# Patient Record
Sex: FEMALE | Race: WHITE | NOT HISPANIC OR LATINO | Employment: UNEMPLOYED | URBAN - NONMETROPOLITAN AREA
[De-identification: names, ages, dates, MRNs, and addresses within clinical notes are randomized per-mention and may not be internally consistent; named-entity substitution may affect disease eponyms.]

---

## 2017-01-03 RX ORDER — CARVEDILOL 25 MG/1
25 TABLET ORAL 2 TIMES DAILY WITH MEALS
Qty: 60 TABLET | Refills: 3 | Status: SHIPPED | OUTPATIENT
Start: 2017-01-03 | End: 2017-06-02 | Stop reason: SDUPTHER

## 2017-05-04 RX ORDER — CLOPIDOGREL BISULFATE 75 MG/1
75 TABLET ORAL DAILY
Qty: 30 TABLET | Refills: 3 | Status: SHIPPED | OUTPATIENT
Start: 2017-05-04 | End: 2017-09-05 | Stop reason: SDUPTHER

## 2017-06-02 RX ORDER — CARVEDILOL 25 MG/1
25 TABLET ORAL 2 TIMES DAILY WITH MEALS
Qty: 60 TABLET | Refills: 0 | Status: SHIPPED | OUTPATIENT
Start: 2017-06-02 | End: 2017-06-16 | Stop reason: SDUPTHER

## 2017-06-15 ENCOUNTER — OFFICE VISIT (OUTPATIENT)
Dept: CARDIOLOGY | Facility: CLINIC | Age: 57
End: 2017-06-15

## 2017-06-15 VITALS
WEIGHT: 114 LBS | HEIGHT: 55 IN | DIASTOLIC BLOOD PRESSURE: 87 MMHG | BODY MASS INDEX: 26.38 KG/M2 | RESPIRATION RATE: 16 BRPM | SYSTOLIC BLOOD PRESSURE: 132 MMHG | HEART RATE: 79 BPM

## 2017-06-15 DIAGNOSIS — R07.89 OTHER CHEST PAIN: Primary | ICD-10-CM

## 2017-06-15 PROBLEM — I10 ESSENTIAL HYPERTENSION: Status: ACTIVE | Noted: 2017-06-15

## 2017-06-15 PROBLEM — R07.9 CHEST PAIN: Status: ACTIVE | Noted: 2017-06-15

## 2017-06-15 PROCEDURE — 93000 ELECTROCARDIOGRAM COMPLETE: CPT | Performed by: INTERNAL MEDICINE

## 2017-06-15 PROCEDURE — 99213 OFFICE O/P EST LOW 20 MIN: CPT | Performed by: INTERNAL MEDICINE

## 2017-06-15 RX ORDER — LORATADINE 10 MG/1
10 CAPSULE, LIQUID FILLED ORAL DAILY
COMMUNITY
End: 2017-11-01

## 2017-06-15 NOTE — PROGRESS NOTES
BUDDY Cutler  Grecia Galicia  1960  06/15/2017    Patient Active Problem List   Diagnosis   • Essential hypertension   • Chest pain       Dear BUDDY Cutler:    Subjective     Grecia Galicia is a 56 y.o. female with the problems as listed above, presents      History of Present Illness: Ms. Galicia is a 55-year-old  female with history of chest pains and had a normal nuclear stress test provoked without 14.  She is here for a regular cardiac follow-up.  She denies any further chest pains or shortness of breath.  He feels overall fine.  She  continues to smoke about a pack a day.        Allergies   Allergen Reactions   • No Known Drug Allergy    :      Current Outpatient Prescriptions:   •  albuterol (PROVENTIL HFA;VENTOLIN HFA) 108 (90 BASE) MCG/ACT inhaler, Inhale 2 puffs every 4 (four) hours as needed for wheezing., Disp: , Rfl:   •  aspirin 81 MG tablet, Take  by mouth., Disp: , Rfl:   •  atorvastatin (LIPITOR) 40 MG tablet, Take  by mouth., Disp: , Rfl:   •  carvedilol (COREG) 25 MG tablet, Take 1 tablet by mouth 2 (Two) Times a Day With Meals., Disp: 60 tablet, Rfl: 0  •  clopidogrel (PLAVIX) 75 MG tablet, Take 1 tablet by mouth Daily., Disp: 30 tablet, Rfl: 3  •  fluticasone (FLONASE) 50 MCG/ACT nasal spray, 2 sprays into each nostril daily. Administer 2 sprays in each nostril for each dose., Disp: , Rfl:   •  Loratadine 10 MG capsule, Take 10 mg by mouth Daily., Disp: , Rfl:   •  budesonide-formoterol (SYMBICORT) 160-4.5 MCG/ACT inhaler, Symbicort AERO; Patient Sig: Symbicort AERO ; 0; 04-Dec-2014; Active, Disp: , Rfl:   •  nitroglycerin (NITROSTAT) 0.4 MG SL tablet, Place 1 tablet under the tongue every 5 (five) minutes as needed for chest pain for up to 25 doses. 1tab q 5mins X3., Disp: 25 tablet, Rfl: 1      The following portions of the patient's history were reviewed and updated as appropriate: allergies, current medications, past family history, past medical  "history, past social history, past surgical history and problem list.    Social History   Substance Use Topics   • Smoking status: Current Every Day Smoker     Packs/day: 1.00     Years: 40.00     Types: Cigarettes   • Smokeless tobacco: Never Used   • Alcohol use No       Review of Systems   Constitution: Negative for chills and fever.   HENT: Negative for headaches, nosebleeds and sore throat.    Respiratory: Negative for cough, hemoptysis and wheezing.    Gastrointestinal: Negative for abdominal pain, hematemesis, hematochezia, melena, nausea and vomiting.   Genitourinary: Negative for dysuria and hematuria.       Objective   Vitals:    06/15/17 1201   BP: 132/87   Pulse: 79   Resp: 16   Weight: 114 lb (51.7 kg)   Height: 48\" (121.9 cm)     Body mass index is 34.79 kg/(m^2).        Physical Exam   Constitutional: She is oriented to person, place, and time. She appears well-developed and well-nourished.   HENT:   Head: Normocephalic.   Eyes: Conjunctivae and EOM are normal.   Neck: Normal range of motion. Neck supple. No JVD present. No tracheal deviation present. No thyromegaly present.   Cardiovascular: Normal rate and regular rhythm.  Exam reveals no gallop and no friction rub.    No murmur heard.  Pulmonary/Chest: Breath sounds normal. No respiratory distress. She has no wheezes. She has no rales.   Abdominal: Soft. Bowel sounds are normal. She exhibits no mass. There is no tenderness.   Musculoskeletal: She exhibits no edema.   Neurological: She is alert and oriented to person, place, and time. No cranial nerve deficit.   Skin: Skin is warm and dry.   Psychiatric: She has a normal mood and affect.       Lab Results   Component Value Date     12/16/2014    K 4.0 12/16/2014     (H) 12/16/2014    CO2 22 12/16/2014    BUN 8 (L) 12/16/2014    CREATININE 0.6 12/16/2014    GLUCOSE 87 12/16/2014    CALCIUM 8.9 12/16/2014     No results found for: CKTOTAL  Lab Results   Component Value Date    WBC 10.79 " 12/16/2014    HGB 11.8 12/16/2014    HCT 37.1 12/16/2014     12/16/2014     Lab Results   Component Value Date    INR 1.00 12/14/2014     Lab Results   Component Value Date    MG 1.8 12/16/2014     No results found for: TSH, PSA, CHLPL, TRIG, HDL, LDL   No results found for: BNP  Echo   No results found for: ECHOEFEST    ECG 12 Lead  Date/Time: 6/15/2017 12:06 PM  Performed by: PEPE GARSIA  Authorized by: PEPE GARSIA   Rhythm: sinus rhythm  BPM: 68  Comments: Nonspecific ST-T wave changes in leads V1 through V3.            Assessment/Plan      1.  chest pain , Probably noncardiac.      2.   Essential hypertension          Recommendations:    1. Discontinue the Plavix and may continue with low-dose aspirin due to risk factors for coronary artery disease.  2. I have strongly reemphasized for her to quit smoking.   3. Follow-up in 9 months.    Return in about 9 months (around 3/15/2018).    As always, I appreciate very much the opportunity to participate in the cardiovascular care of your patients.      With Best Regards,    Pepe Garsia MD, [facility]    Dragon disclaimer:  Much of this encounter note is an electronic transcription/translation of spoken language to printed text. The electronic translation of spoken language may permit erroneous, or at times, nonsensical words or phrases to be inadvertently transcribed; Although I have reviewed the note for such errors, some may still exist.

## 2017-06-16 RX ORDER — CARVEDILOL 25 MG/1
25 TABLET ORAL 2 TIMES DAILY WITH MEALS
Qty: 60 TABLET | Refills: 5 | Status: SHIPPED | OUTPATIENT
Start: 2017-06-16 | End: 2018-03-12 | Stop reason: SDUPTHER

## 2017-06-16 RX ORDER — NITROGLYCERIN 0.4 MG/1
0.4 TABLET SUBLINGUAL
Qty: 25 TABLET | Refills: 1 | Status: SHIPPED | OUTPATIENT
Start: 2017-06-16 | End: 2019-01-17 | Stop reason: SDUPTHER

## 2017-06-19 ENCOUNTER — TELEPHONE (OUTPATIENT)
Dept: CARDIOLOGY | Facility: CLINIC | Age: 57
End: 2017-06-19

## 2017-06-19 NOTE — TELEPHONE ENCOUNTER
It seems she was likely on Plavix as well as aspirin due to history of peripheral arterial disease in her legs which was significant in 2014. Will go ahead and restart Plavix 75mg QD as long as she isn't having any bleeding problems.

## 2017-09-05 RX ORDER — CLOPIDOGREL BISULFATE 75 MG/1
75 TABLET ORAL DAILY
Qty: 30 TABLET | Refills: 6 | Status: SHIPPED | OUTPATIENT
Start: 2017-09-05 | End: 2018-05-01 | Stop reason: SDUPTHER

## 2017-09-05 NOTE — TELEPHONE ENCOUNTER
Rx request for plavix from [facility]. Per phone note from Stephanie 6/19/17, pt to restart Plavix 75 mg daily. Next ov is 3/8/17 with leslie.

## 2017-11-01 ENCOUNTER — APPOINTMENT (OUTPATIENT)
Dept: GENERAL RADIOLOGY | Facility: HOSPITAL | Age: 57
End: 2017-11-01

## 2017-11-01 ENCOUNTER — HOSPITAL ENCOUNTER (INPATIENT)
Facility: HOSPITAL | Age: 57
LOS: 3 days | Discharge: HOME | End: 2017-11-04
Attending: EMERGENCY MEDICINE | Admitting: INTERNAL MEDICINE

## 2017-11-01 DIAGNOSIS — J96.01 ACUTE RESPIRATORY FAILURE WITH HYPOXEMIA (HCC): ICD-10-CM

## 2017-11-01 DIAGNOSIS — J18.9 PNEUMONIA OF BOTH LOWER LOBES DUE TO INFECTIOUS ORGANISM: Primary | ICD-10-CM

## 2017-11-01 LAB
A-A DO2: 55.7 MMHG (ref 0–300)
ALBUMIN SERPL-MCNC: 4.2 G/DL (ref 3.5–5)
ALBUMIN/GLOB SERPL: 1.1 G/DL (ref 1.5–2.5)
ALP SERPL-CCNC: 174 U/L (ref 35–104)
ALT SERPL W P-5'-P-CCNC: 33 U/L (ref 10–36)
ANION GAP SERPL CALCULATED.3IONS-SCNC: 11.1 MMOL/L (ref 3.6–11.2)
ARTERIAL PATENCY WRIST A: ABNORMAL
AST SERPL-CCNC: 30 U/L (ref 10–30)
ATMOSPHERIC PRESS: 727 MMHG
BASE EXCESS BLDA CALC-SCNC: 6.2 MMOL/L
BASOPHILS # BLD AUTO: 0.03 10*3/MM3 (ref 0–0.3)
BASOPHILS NFR BLD AUTO: 0.1 % (ref 0–2)
BDY SITE: ABNORMAL
BILIRUB SERPL-MCNC: 0.6 MG/DL (ref 0.2–1.8)
BNP SERPL-MCNC: 22 PG/ML (ref 0–100)
BODY TEMPERATURE: 98.6 C
BUN BLD-MCNC: 15 MG/DL (ref 7–21)
BUN/CREAT SERPL: 20.8 (ref 7–25)
CALCIUM SPEC-SCNC: 9.9 MG/DL (ref 7.7–10)
CHLORIDE SERPL-SCNC: 95 MMOL/L (ref 99–112)
CO2 SERPL-SCNC: 30.9 MMOL/L (ref 24.3–31.9)
COHGB MFR BLD: 1.4 % (ref 0–5)
CREAT BLD-MCNC: 0.72 MG/DL (ref 0.43–1.29)
CRP SERPL-MCNC: 25.22 MG/DL (ref 0–0.99)
D-LACTATE SERPL-SCNC: 1.3 MMOL/L (ref 0.5–2)
DEPRECATED RDW RBC AUTO: 43.1 FL (ref 37–54)
EOSINOPHIL # BLD AUTO: 0.02 10*3/MM3 (ref 0–0.7)
EOSINOPHIL NFR BLD AUTO: 0.1 % (ref 0–5)
ERYTHROCYTE [DISTWIDTH] IN BLOOD BY AUTOMATED COUNT: 13.1 % (ref 11.5–14.5)
FLUAV AG NPH QL: NEGATIVE
FLUBV AG NPH QL IA: NEGATIVE
GFR SERPL CREATININE-BSD FRML MDRD: 84 ML/MIN/1.73
GLOBULIN UR ELPH-MCNC: 3.8 GM/DL
GLUCOSE BLD-MCNC: 111 MG/DL (ref 70–110)
HBA1C MFR BLD: 5.9 % (ref 4.5–5.7)
HCO3 BLDA-SCNC: 28.7 MMOL/L (ref 22–26)
HCT VFR BLD AUTO: 38.7 % (ref 37–47)
HCT VFR BLD CALC: 39 % (ref 37–47)
HGB BLD-MCNC: 12.9 G/DL (ref 12–16)
HGB BLDA-MCNC: 13.1 G/DL (ref 12–16)
HOLD SPECIMEN: NORMAL
HOLD SPECIMEN: NORMAL
HOROWITZ INDEX BLD+IHG-RTO: 21 %
IMM GRANULOCYTES # BLD: 0.09 10*3/MM3 (ref 0–0.03)
IMM GRANULOCYTES NFR BLD: 0.4 % (ref 0–0.5)
LYMPHOCYTES # BLD AUTO: 1.51 10*3/MM3 (ref 1–3)
LYMPHOCYTES NFR BLD AUTO: 7.5 % (ref 21–51)
M PNEUMO IGM SER QL: POSITIVE
MCH RBC QN AUTO: 31.2 PG (ref 27–33)
MCHC RBC AUTO-ENTMCNC: 33.3 G/DL (ref 33–37)
MCV RBC AUTO: 93.5 FL (ref 80–94)
METHGB BLD QL: 0.2 % (ref 0–3)
MODALITY: ABNORMAL
MONOCYTES # BLD AUTO: 1.06 10*3/MM3 (ref 0.1–0.9)
MONOCYTES NFR BLD AUTO: 5.2 % (ref 0–10)
NEUTROPHILS # BLD AUTO: 17.5 10*3/MM3 (ref 1.4–6.5)
NEUTROPHILS NFR BLD AUTO: 86.7 % (ref 30–70)
OSMOLALITY SERPL CALC.SUM OF ELEC: 275.3 MOSM/KG (ref 273–305)
OXYHGB MFR BLDV: 83.6 % (ref 85–100)
PCO2 BLDA: 34.7 MM HG (ref 35–45)
PH BLDA: 7.54 PH UNITS (ref 7.35–7.45)
PLATELET # BLD AUTO: 214 10*3/MM3 (ref 130–400)
PMV BLD AUTO: 11.8 FL (ref 6–10)
PO2 BLDA: 45.5 MM HG (ref 80–100)
POTASSIUM BLD-SCNC: 3.5 MMOL/L (ref 3.5–5.3)
PROT SERPL-MCNC: 8 G/DL (ref 6–8)
RBC # BLD AUTO: 4.14 10*6/MM3 (ref 4.2–5.4)
SAO2 % BLDCOA: 85 % (ref 90–100)
SODIUM BLD-SCNC: 137 MMOL/L (ref 135–153)
TROPONIN I SERPL-MCNC: <0.006 NG/ML
TSH SERPL DL<=0.05 MIU/L-ACNC: 0.87 MIU/ML (ref 0.55–4.78)
WBC NRBC COR # BLD: 20.21 10*3/MM3 (ref 4.5–12.5)
WHOLE BLOOD HOLD SPECIMEN: NORMAL
WHOLE BLOOD HOLD SPECIMEN: NORMAL

## 2017-11-01 PROCEDURE — 82375 ASSAY CARBOXYHB QUANT: CPT | Performed by: PHYSICIAN ASSISTANT

## 2017-11-01 PROCEDURE — 93010 ELECTROCARDIOGRAM REPORT: CPT | Performed by: INTERNAL MEDICINE

## 2017-11-01 PROCEDURE — 85025 COMPLETE CBC W/AUTO DIFF WBC: CPT | Performed by: PHYSICIAN ASSISTANT

## 2017-11-01 PROCEDURE — 93005 ELECTROCARDIOGRAM TRACING: CPT | Performed by: PHYSICIAN ASSISTANT

## 2017-11-01 PROCEDURE — 83036 HEMOGLOBIN GLYCOSYLATED A1C: CPT | Performed by: PHYSICIAN ASSISTANT

## 2017-11-01 PROCEDURE — 87070 CULTURE OTHR SPECIMN AEROBIC: CPT | Performed by: PHYSICIAN ASSISTANT

## 2017-11-01 PROCEDURE — 83050 HGB METHEMOGLOBIN QUAN: CPT | Performed by: PHYSICIAN ASSISTANT

## 2017-11-01 PROCEDURE — 36600 WITHDRAWAL OF ARTERIAL BLOOD: CPT | Performed by: PHYSICIAN ASSISTANT

## 2017-11-01 PROCEDURE — 82805 BLOOD GASES W/O2 SATURATION: CPT | Performed by: PHYSICIAN ASSISTANT

## 2017-11-01 PROCEDURE — 87205 SMEAR GRAM STAIN: CPT | Performed by: PHYSICIAN ASSISTANT

## 2017-11-01 PROCEDURE — 87077 CULTURE AEROBIC IDENTIFY: CPT | Performed by: PHYSICIAN ASSISTANT

## 2017-11-01 PROCEDURE — 25010000002 METHYLPREDNISOLONE PER 40 MG: Performed by: PHYSICIAN ASSISTANT

## 2017-11-01 PROCEDURE — 94799 UNLISTED PULMONARY SVC/PX: CPT

## 2017-11-01 PROCEDURE — 87185 SC STD ENZYME DETCJ PER NZM: CPT | Performed by: PHYSICIAN ASSISTANT

## 2017-11-01 PROCEDURE — 87804 INFLUENZA ASSAY W/OPTIC: CPT | Performed by: PHYSICIAN ASSISTANT

## 2017-11-01 PROCEDURE — 94640 AIRWAY INHALATION TREATMENT: CPT

## 2017-11-01 PROCEDURE — 86140 C-REACTIVE PROTEIN: CPT | Performed by: PHYSICIAN ASSISTANT

## 2017-11-01 PROCEDURE — 99223 1ST HOSP IP/OBS HIGH 75: CPT | Performed by: INTERNAL MEDICINE

## 2017-11-01 PROCEDURE — 80053 COMPREHEN METABOLIC PANEL: CPT | Performed by: PHYSICIAN ASSISTANT

## 2017-11-01 PROCEDURE — 99285 EMERGENCY DEPT VISIT HI MDM: CPT

## 2017-11-01 PROCEDURE — 25010000002 AZITHROMYCIN: Performed by: PHYSICIAN ASSISTANT

## 2017-11-01 PROCEDURE — 71020 XR CHEST 2 VW: CPT | Performed by: RADIOLOGY

## 2017-11-01 PROCEDURE — 83880 ASSAY OF NATRIURETIC PEPTIDE: CPT | Performed by: PHYSICIAN ASSISTANT

## 2017-11-01 PROCEDURE — 71020 HC CHEST PA AND LATERAL: CPT

## 2017-11-01 PROCEDURE — 83605 ASSAY OF LACTIC ACID: CPT | Performed by: PHYSICIAN ASSISTANT

## 2017-11-01 PROCEDURE — 84484 ASSAY OF TROPONIN QUANT: CPT | Performed by: PHYSICIAN ASSISTANT

## 2017-11-01 PROCEDURE — 25010000002 HEPARIN (PORCINE) PER 1000 UNITS: Performed by: PHYSICIAN ASSISTANT

## 2017-11-01 PROCEDURE — 87040 BLOOD CULTURE FOR BACTERIA: CPT | Performed by: PHYSICIAN ASSISTANT

## 2017-11-01 PROCEDURE — 84443 ASSAY THYROID STIM HORMONE: CPT | Performed by: PHYSICIAN ASSISTANT

## 2017-11-01 PROCEDURE — 25010000002 CEFTRIAXONE PER 250 MG: Performed by: PHYSICIAN ASSISTANT

## 2017-11-01 RX ORDER — LORATADINE 10 MG/1
10 TABLET ORAL NIGHTLY
COMMUNITY

## 2017-11-01 RX ORDER — METHYLPREDNISOLONE SODIUM SUCCINATE 40 MG/ML
40 INJECTION, POWDER, LYOPHILIZED, FOR SOLUTION INTRAMUSCULAR; INTRAVENOUS EVERY 12 HOURS
Status: DISCONTINUED | OUTPATIENT
Start: 2017-11-01 | End: 2017-11-02

## 2017-11-01 RX ORDER — IPRATROPIUM BROMIDE AND ALBUTEROL SULFATE 2.5; .5 MG/3ML; MG/3ML
3 SOLUTION RESPIRATORY (INHALATION)
Status: DISCONTINUED | OUTPATIENT
Start: 2017-11-01 | End: 2017-11-01

## 2017-11-01 RX ORDER — BENZONATATE 100 MG/1
200 CAPSULE ORAL 3 TIMES DAILY PRN
Status: DISCONTINUED | OUTPATIENT
Start: 2017-11-01 | End: 2017-11-04 | Stop reason: HOSPADM

## 2017-11-01 RX ORDER — ALBUTEROL SULFATE 2.5 MG/3ML
2.5 SOLUTION RESPIRATORY (INHALATION) EVERY 6 HOURS PRN
Status: CANCELLED | OUTPATIENT
Start: 2017-11-01

## 2017-11-01 RX ORDER — IPRATROPIUM BROMIDE AND ALBUTEROL SULFATE 2.5; .5 MG/3ML; MG/3ML
3 SOLUTION RESPIRATORY (INHALATION) ONCE
Status: COMPLETED | OUTPATIENT
Start: 2017-11-01 | End: 2017-11-01

## 2017-11-01 RX ORDER — GUAIFENESIN 600 MG/1
600 TABLET, EXTENDED RELEASE ORAL EVERY 12 HOURS SCHEDULED
Status: DISCONTINUED | OUTPATIENT
Start: 2017-11-01 | End: 2017-11-04 | Stop reason: HOSPADM

## 2017-11-01 RX ORDER — CETIRIZINE HYDROCHLORIDE 10 MG/1
10 TABLET ORAL DAILY
Status: DISCONTINUED | OUTPATIENT
Start: 2017-11-01 | End: 2017-11-04 | Stop reason: HOSPADM

## 2017-11-01 RX ORDER — IPRATROPIUM BROMIDE AND ALBUTEROL SULFATE 2.5; .5 MG/3ML; MG/3ML
3 SOLUTION RESPIRATORY (INHALATION) EVERY 6 HOURS PRN
Status: DISCONTINUED | OUTPATIENT
Start: 2017-11-01 | End: 2017-11-04 | Stop reason: HOSPADM

## 2017-11-01 RX ORDER — ATORVASTATIN CALCIUM 40 MG/1
40 TABLET, FILM COATED ORAL NIGHTLY
Status: DISCONTINUED | OUTPATIENT
Start: 2017-11-01 | End: 2017-11-04 | Stop reason: HOSPADM

## 2017-11-01 RX ORDER — HEPARIN SODIUM 5000 [USP'U]/ML
5000 INJECTION, SOLUTION INTRAVENOUS; SUBCUTANEOUS EVERY 12 HOURS SCHEDULED
Status: DISCONTINUED | OUTPATIENT
Start: 2017-11-01 | End: 2017-11-04 | Stop reason: HOSPADM

## 2017-11-01 RX ORDER — SODIUM CHLORIDE 9 MG/ML
60 INJECTION, SOLUTION INTRAVENOUS CONTINUOUS
Status: DISCONTINUED | OUTPATIENT
Start: 2017-11-01 | End: 2017-11-03

## 2017-11-01 RX ORDER — GUAIFENESIN/DEXTROMETHORPHAN 100-10MG/5
5 SYRUP ORAL EVERY 4 HOURS PRN
Status: DISCONTINUED | OUTPATIENT
Start: 2017-11-01 | End: 2017-11-04 | Stop reason: HOSPADM

## 2017-11-01 RX ORDER — SODIUM CHLORIDE 0.9 % (FLUSH) 0.9 %
10 SYRINGE (ML) INJECTION AS NEEDED
Status: DISCONTINUED | OUTPATIENT
Start: 2017-11-01 | End: 2017-11-04 | Stop reason: HOSPADM

## 2017-11-01 RX ORDER — ASPIRIN 81 MG/1
81 TABLET ORAL NIGHTLY
Status: DISCONTINUED | OUTPATIENT
Start: 2017-11-01 | End: 2017-11-04 | Stop reason: HOSPADM

## 2017-11-01 RX ORDER — NITROGLYCERIN 0.4 MG/1
0.4 TABLET SUBLINGUAL
Status: CANCELLED | OUTPATIENT
Start: 2017-11-01

## 2017-11-01 RX ORDER — BUDESONIDE AND FORMOTEROL FUMARATE DIHYDRATE 160; 4.5 UG/1; UG/1
2 AEROSOL RESPIRATORY (INHALATION)
COMMUNITY

## 2017-11-01 RX ORDER — CARVEDILOL 25 MG/1
25 TABLET ORAL 2 TIMES DAILY WITH MEALS
Status: DISCONTINUED | OUTPATIENT
Start: 2017-11-01 | End: 2017-11-04 | Stop reason: HOSPADM

## 2017-11-01 RX ORDER — CLOPIDOGREL BISULFATE 75 MG/1
75 TABLET ORAL NIGHTLY
Status: DISCONTINUED | OUTPATIENT
Start: 2017-11-01 | End: 2017-11-04 | Stop reason: HOSPADM

## 2017-11-01 RX ORDER — SODIUM CHLORIDE 0.9 % (FLUSH) 0.9 %
1-10 SYRINGE (ML) INJECTION AS NEEDED
Status: DISCONTINUED | OUTPATIENT
Start: 2017-11-01 | End: 2017-11-04 | Stop reason: HOSPADM

## 2017-11-01 RX ORDER — BUDESONIDE AND FORMOTEROL FUMARATE DIHYDRATE 160; 4.5 UG/1; UG/1
2 AEROSOL RESPIRATORY (INHALATION)
Status: DISCONTINUED | OUTPATIENT
Start: 2017-11-01 | End: 2017-11-04 | Stop reason: HOSPADM

## 2017-11-01 RX ORDER — IPRATROPIUM BROMIDE AND ALBUTEROL SULFATE 2.5; .5 MG/3ML; MG/3ML
3 SOLUTION RESPIRATORY (INHALATION)
Status: DISCONTINUED | OUTPATIENT
Start: 2017-11-01 | End: 2017-11-04 | Stop reason: HOSPADM

## 2017-11-01 RX ORDER — NITROGLYCERIN 0.4 MG/1
0.4 TABLET SUBLINGUAL
Status: DISCONTINUED | OUTPATIENT
Start: 2017-11-01 | End: 2017-11-04 | Stop reason: HOSPADM

## 2017-11-01 RX ADMIN — CARVEDILOL 25 MG: 25 TABLET, FILM COATED ORAL at 17:28

## 2017-11-01 RX ADMIN — GUAIFENESIN 600 MG: 600 TABLET, EXTENDED RELEASE ORAL at 20:18

## 2017-11-01 RX ADMIN — HEPARIN SODIUM 5000 UNITS: 5000 INJECTION, SOLUTION INTRAVENOUS; SUBCUTANEOUS at 20:18

## 2017-11-01 RX ADMIN — SODIUM CHLORIDE 75 ML/HR: 9 INJECTION, SOLUTION INTRAVENOUS at 17:28

## 2017-11-01 RX ADMIN — METHYLPREDNISOLONE SODIUM SUCCINATE 40 MG: 40 INJECTION, POWDER, FOR SOLUTION INTRAMUSCULAR; INTRAVENOUS at 18:00

## 2017-11-01 RX ADMIN — BENZONATATE 200 MG: 100 CAPSULE, LIQUID FILLED ORAL at 18:27

## 2017-11-01 RX ADMIN — BUDESONIDE AND FORMOTEROL FUMARATE DIHYDRATE 2 PUFF: 160; 4.5 AEROSOL RESPIRATORY (INHALATION) at 21:35

## 2017-11-01 RX ADMIN — IPRATROPIUM BROMIDE AND ALBUTEROL SULFATE 3 ML: .5; 3 SOLUTION RESPIRATORY (INHALATION) at 19:20

## 2017-11-01 RX ADMIN — ATORVASTATIN CALCIUM 40 MG: 40 TABLET, FILM COATED ORAL at 20:18

## 2017-11-01 RX ADMIN — CEFTRIAXONE 2 G: 2 INJECTION, SOLUTION INTRAVENOUS at 13:21

## 2017-11-01 RX ADMIN — DOXYCYCLINE 100 MG: 100 INJECTION, POWDER, LYOPHILIZED, FOR SOLUTION INTRAVENOUS at 17:28

## 2017-11-01 RX ADMIN — GUAIFENESIN AND DEXTROMETHORPHAN 5 ML: 100; 10 SYRUP ORAL at 18:27

## 2017-11-01 RX ADMIN — CETIRIZINE HYDROCHLORIDE 10 MG: 10 TABLET ORAL at 20:18

## 2017-11-01 RX ADMIN — CLOPIDOGREL 75 MG: 75 TABLET, FILM COATED ORAL at 20:18

## 2017-11-01 RX ADMIN — IPRATROPIUM BROMIDE AND ALBUTEROL SULFATE 3 ML: .5; 3 SOLUTION RESPIRATORY (INHALATION) at 13:10

## 2017-11-01 RX ADMIN — ASPIRIN 81 MG: 81 TABLET ORAL at 20:18

## 2017-11-01 RX ADMIN — AZITHROMYCIN 500 MG: 500 INJECTION, POWDER, LYOPHILIZED, FOR SOLUTION INTRAVENOUS at 13:58

## 2017-11-01 NOTE — ED PROVIDER NOTES
Subjective   Patient is a 57 y.o. female presenting with shortness of breath.   Shortness of Breath   Severity:  Severe  Onset quality:  Gradual  Duration:  10 days  Timing:  Constant  Progression:  Worsening  Chronicity:  New  Context: activity    Relieved by:  Nothing  Worsened by:  Deep breathing, coughing and exertion  Associated symptoms: cough, fever (Subjective), sputum production (yellowish green) and wheezing    Associated symptoms: no abdominal pain and no chest pain        Review of Systems   Constitutional: Positive for fever (Subjective).   HENT: Negative.    Respiratory: Positive for cough, sputum production (yellowish green), shortness of breath and wheezing.    Cardiovascular: Negative.  Negative for chest pain.   Gastrointestinal: Negative.  Negative for abdominal pain.   Endocrine: Negative.    Genitourinary: Negative.  Negative for dysuria.   Skin: Negative.    Neurological: Negative.    Psychiatric/Behavioral: Negative.    All other systems reviewed and are negative.      Past Medical History:   Diagnosis Date   • Atherosclerotic cardiovascular disease    • Dyslipidemia    • Hypertension    • Myocardial infarction     WITH STENTING IN 2007.    • PAD (peripheral artery disease)        Allergies   Allergen Reactions   • No Known Drug Allergy        Past Surgical History:   Procedure Laterality Date   • CORONARY ANGIOPLASTY  2007    ALSO STENTING x1, RCA    • FEMORAL FEMORAL BYPASS Right    • ILIAC ARTERY STENT         Family History   Problem Relation Age of Onset   • Heart attack Mother    • Heart disease Brother        Social History     Social History   • Marital status:      Spouse name: N/A   • Number of children: N/A   • Years of education: N/A     Social History Main Topics   • Smoking status: Current Every Day Smoker     Packs/day: 1.00     Years: 40.00     Types: Cigarettes   • Smokeless tobacco: Never Used   • Alcohol use No   • Drug use: No   • Sexual activity: Defer     Other  Topics Concern   • None     Social History Narrative   • None           Objective   Physical Exam   Constitutional: She is oriented to person, place, and time. She appears well-developed and well-nourished. No distress.   HENT:   Head: Normocephalic and atraumatic.   Right Ear: External ear normal.   Left Ear: External ear normal.   Nose: Nose normal.   Eyes: Conjunctivae and EOM are normal. Pupils are equal, round, and reactive to light.   Neck: Normal range of motion. Neck supple. No JVD present. No tracheal deviation present.   Cardiovascular: Normal rate, regular rhythm and normal heart sounds.    No murmur heard.  Pulmonary/Chest: Effort normal. No respiratory distress. She has wheezes.   Decreased throughout though worse in the right base.   Abdominal: Soft. Bowel sounds are normal. There is no tenderness.   Musculoskeletal: Normal range of motion. She exhibits no edema or deformity.   Neurological: She is alert and oriented to person, place, and time. No cranial nerve deficit.   Skin: Skin is warm and dry. No rash noted. She is not diaphoretic. No erythema. No pallor.   Psychiatric: She has a normal mood and affect. Her behavior is normal. Thought content normal.   Nursing note and vitals reviewed.      Procedures         ED Course  ED Course   Value Comment By Time    Paged Dr Berry. SIMÓN Marion 11/01 1413   ECG 12 Lead Sinus tachycardia rate of 108 nonspecific ST changes per SIMÓN Winston 11/01 1414    D/w  Dr. Berry who is agreeable to admit the patient to telemetry.  Patient was feeling much better. SIMÓN Marion 11/01 1427                  MDM  Number of Diagnoses or Management Options  new and requires workup  new and requires workup     Amount and/or Complexity of Data Reviewed  Clinical lab tests: ordered and reviewed  Tests in the radiology section of CPT®: ordered and reviewed  Discuss the patient with other providers: yes    Risk of Complications, Morbidity, and/or  Mortality  Presenting problems: high  Diagnostic procedures: high    Critical Care  Total time providing critical care: 30-74 minutes (30)      Final diagnoses:   Pneumonia of both lower lobes due to infectious organism   Acute respiratory failure with hypoxemia            SIMÓN Marion  11/01/17 1543

## 2017-11-01 NOTE — H&P
Patient Identification:  Name:  Grecia Galicia  Age:  57 y.o.  Sex:  female  :  1960  MRN:  [id number]   Visit Number:  [id number]  Primary Care Physician:  BUDDY Cutler    I have seen the patient in conjunction with Stephanie Osman PA-C and I agree with the following statements. I have made any necessary changes below to reflect my findings.      Chief complaint: Dyspnea    History of presenting illness:   is a 56 yo female with known history of CAD, PAD s/p fem-fem bypass surgery, hypertension, and COPD.  She presented to the ED on this date with 10 days of progressively worsening dyspnea with cough, wheeze, chills, and subjective fever.  She initially presented to [facility] and she was referred to the ED for further evaluation and treatment. She was initially hypoxic on room air on her initial blood gas. She does not have nor utilize home oxygen.  She reports a cough productive of yellow-gray mucus. She denies hemoptysis, but states she may have had a tiny, bloody speck in her sputum yesterday.  She denies chest pain.  She denies abdominal pain, nausea, and vomiting.  She denies dysuria and hematuria.  She denies outpatient treatment. Her flu swab was negative in the ED.  She reports she is normally independent of her daily activities at home.  She dresses herself and cooks. Over the past 10 days, she has had difficulty ambulating since having shortness of breath and cough.   Actively coughing up yellow sputum during evaluation.     Mrs. Galicia reports her last hospitalization to be when she underwent fem-fem bypass by Dr. Lee in late .  It appears a stress test and echocardiogram were ordered by Dr. Olivera in 2017; however, it does not appear that Mrs. Galicia underwent testing.     I was accompanied by ANGEL Shukla during my encounter. Pt provides me with similar details as outlined above. States she has not smoked in 2 weeks since she has been feeling ill.  Denies CP. Reports some choking episodes while coughing up excessive sputum but denies any other episodes of choking. Currently requesting something for her cough.     ---------------------------------------------------------------------------------------------------------------------   Review of Systems   Constitutional: Positive for chills, fatigue and fever. Negative for activity change.   HENT: Negative for congestion and hearing loss.    Eyes: Negative for pain and discharge.   Respiratory: Positive for cough, shortness of breath and wheezing. Negative for chest tightness.    Cardiovascular: Negative for chest pain, palpitations and leg swelling.   Gastrointestinal: Negative for abdominal distention and constipation.   Endocrine: Negative for cold intolerance and heat intolerance.   Genitourinary: Negative for difficulty urinating, dysuria and flank pain.   Musculoskeletal: Negative for arthralgias and gait problem.   Skin: Negative for color change and rash.   Neurological: Negative for dizziness, syncope and headaches.   Psychiatric/Behavioral: Negative for agitation and confusion.      ---------------------------------------------------------------------------------------------------------------------   Past Medical History:   Diagnosis Date   • Atherosclerotic cardiovascular disease    • Dyslipidemia    • Hypertension    • Myocardial infarction     WITH STENTING IN 2007.    • PAD (peripheral artery disease)      Past Surgical History:   Procedure Laterality Date   • CORONARY ANGIOPLASTY  2007    ALSO STENTING x1, RCA    • FEMORAL FEMORAL BYPASS Right    • ILIAC ARTERY STENT       Family History   Problem Relation Age of Onset   • Heart attack Mother    • Heart disease Brother      Social History     Social History   • Marital status:      Spouse name: N/A   • Number of children: N/A   • Years of education: N/A     Social History Main Topics   • Smoking status: Current Every Day Smoker     Packs/day:  1.00     Years: 40.00     Types: Cigarettes   • Smokeless tobacco: Never Used   • Alcohol use No   • Drug use: No   • Sexual activity: Defer     Other Topics Concern   • None     Social History Narrative   • None     ---------------------------------------------------------------------------------------------------------------------   Allergies:  No known drug allergy  ---------------------------------------------------------------------------------------------------------------------   Prior to Admission Medications     Prescriptions Last Dose Informant Patient Reported? Taking?    albuterol (PROVENTIL HFA;VENTOLIN HFA) 108 (90 BASE) MCG/ACT inhaler   Yes No    Inhale 2 puffs every 4 (four) hours as needed for wheezing.    aspirin 81 MG tablet   Yes No    Take  by mouth.    atorvastatin (LIPITOR) 40 MG tablet   Yes No    Take  by mouth.    budesonide-formoterol (SYMBICORT) 160-4.5 MCG/ACT inhaler   Yes No    Symbicort AERO; Patient Sig: Symbicort AERO ; 0; 04-Dec-2014; Active    carvedilol (COREG) 25 MG tablet   No No    Take 1 tablet by mouth 2 (Two) Times a Day With Meals.    clopidogrel (PLAVIX) 75 MG tablet   No No    Take 1 tablet by mouth Daily.    fluticasone (FLONASE) 50 MCG/ACT nasal spray   Yes No    2 sprays into each nostril daily. Administer 2 sprays in each nostril for each dose.    Loratadine 10 MG capsule   Yes No    Take 10 mg by mouth Daily.    nitroglycerin (NITROSTAT) 0.4 MG SL tablet   No No    Place 1 tablet under the tongue Every 5 (Five) Minutes As Needed for Chest Pain for up to 25 doses. 1tab q 5mins X3.        Hospital Scheduled Meds:    [START ON 11/2/2017] ceftriaxone 1 g Intravenous Q24H   doxycycline 100 mg Intravenous Q12H   guaiFENesin 600 mg Oral Q12H   heparin (porcine) 5,000 Units Subcutaneous Q12H   ipratropium-albuterol 3 mL Nebulization 4x Daily - RT   methylPREDNISolone sodium succinate 40 mg Intravenous Q12H       sodium chloride 75 mL/hr      ---------------------------------------------------------------------------------------------------------------------   Vital Signs:  Temp:  [98.3 °F (36.8 °C)-98.8 °F (37.1 °C)] 98.8 °F (37.1 °C)  Heart Rate:  [101-110] 105  Resp:  [18-24] 18  BP: (103-128)/(68-81) 115/74  Last 3 weights    11/01/17  1246   Weight: 90 lb (40.8 kg)     Body mass index is 27.46 kg/(m^2).  ---------------------------------------------------------------------------------------------------------------------   Physical Exam:  Constitutional:  Chronically ill appearing and appears older than stated age.    HENT:  Head: Normocephalic and atraumatic.  Mouth:  Moist mucous membranes.    Eyes:  Conjunctivae and EOM are normal.  Pupils are equal, round, and reactive to light.  No scleral icterus.  Neck:  Neck supple.  No JVD present.    Cardiovascular:  Normal rate, regular rhythm.  S1/S2 appreciated with no murmur.  Pulmonary/Chest:  Respirations regular, even and unlabored. Diminished breath sounds throughout. Upper expiratory wheezing appreciated with auscultation.   Mild left basilar rhonchi appreciated posteriorly.    Abdominal:  Soft.  Bowel sounds are present.  No distension and no tenderness.   Musculoskeletal:  No edema and no tenderness.    Neurological:  Alert and oriented to person, place, and time.  No facial droop.  .   Skin:  Skin is warm and dry.  No rash noted.  No pallor.   Peripheral vascular:  No edema and strong pulses in all 4 extremities.  ---------------------------------------------------------------------------------------------------------------------  EKG:            Telemetry:  Sinus tachycardia in the 100s during bedside evaluation  ---------------------------------------------------------------------------------------------------------------------     Results from last 7 days  Lab Units 11/01/17  1314   CRP mg/dL 25.22*   LACTATE mmol/L 1.3   WBC 10*3/mm3 20.21*   HEMOGLOBIN g/dL 12.9   HEMATOCRIT % 38.7    MCV fL 93.5   MCHC g/dL 33.3   PLATELETS 10*3/mm3 214       Results from last 7 days  Lab Units 11/01/17  1303   PH, ARTERIAL pH units 7.536*   PO2 ART mm Hg 45.5*   PCO2, ARTERIAL mm Hg 34.7*   HCO3 ART mmol/L 28.7*       Results from last 7 days  Lab Units 11/01/17  1314   SODIUM mmol/L 137   POTASSIUM mmol/L 3.5   CHLORIDE mmol/L 95*   CO2 mmol/L 30.9   BUN mg/dL 15   CREATININE mg/dL 0.72   EGFR IF NONAFRICN AM mL/min/1.73 84   CALCIUM mg/dL 9.9   GLUCOSE mg/dL 111*   ALBUMIN g/dL 4.20   BILIRUBIN mg/dL 0.6   ALK PHOS U/L 174*   AST (SGOT) U/L 30   ALT (SGPT) U/L 33   Estimated Creatinine Clearance: 56.2 mL/min (by C-G formula based on Cr of 0.72).  No results found for: AMMONIA    Results from last 7 days  Lab Units 11/01/17  1314   TROPONIN I ng/mL <0.006         Lab Results   Component Value Date    HGBA1C 5.4 12/14/2014     No results found for: TSH, FREET4  Lab Results   Component Value Date    PREGTESTUR Negative 12/15/2014     Pain Management Panel     There is no flowsheet data to display.                        ---------------------------------------------------------------------------------------------------------------------  Imaging Results (last 7 days)     Procedure Component Value Units Date/Time    XR Chest 2 View [[id number]] Collected:  11/01/17 1347     Updated:  11/01/17 1423    Narrative:       EXAMINATION: XR CHEST 2 VW-      CLINICAL INDICATION: Simple sepsis protocol          COMPARISON: 10/1/2014      TECHNIQUE: XR CHEST 2 VW-      FINDINGS:   Diffuse interstitial markings and minimal bibasilar consolidation   Heart and mediastinal contours are unremarkable.   No pneumothorax.   Bony and soft tissue structures are unremarkable.            Impression:       Bibasilar consolidation     This report was finalized on 11/1/2017 1:47 PM by Dr. Edgar Soler MD.             Cultures:         I have personally reviewed the radiology images and read the final radiology  "report.  ---------------------------------------------------------------------------------------------------------------------  Assessment and Plan:    -Severe sepsis with HR>90, RR of 24, C-RP elevation and leukocytosis. Likely 2/2 bibasilar pneumonia. Evidence of end organ dysfunction including acute hypoxic respiratory failure.  Lactic acid is 1.3.  Blood cultures have been ordered.   IV Rocephin and IV doxycycline have been ordered.  Sputum culture has been ordered.  C-RP daily was added.  Will add urinalysis for further infectious workup. Repeat labs in the AM.     -Bibasilar pneumonia, community acquired: IV antibiotics as previously outlined.  Sputum culture ordered. Pt is now Mycoplasma (+). Legionella ordered but not yet collected. Place in droplet precautions.  Mucinex added.      -Acute hypoxic respiratory failure with pO2 of 45.5 on initial ABG (on room air): Likely multifactorial given bibasilar pneumonia and acute COPD exacerbation: Continue supplemental oxygen via nasal cannula.  Continue with continuous pulse oximetry.  IV antibiotics as previously outlined. Start IV steroids.     -Acute exacerbation of COPD: IV methylprednisolone 40 mg BID has been added in addition to duoneb treatments. Mucinex ordered as well. Will add PRN Tessalon pearles and Robitussin. Discussed tobacco cessation and pt states she has not smoked in 2 weeks after becoming ill. States she \"hopes\" she can continue not to smoke.     -Hypertension, controlled at present:  Restart home medications when available.       -CAD s/p RCA stent, stable without complaints of chest pain: Cont home medication regimen and monitor on telemetry.     -Peripheral arterial disease:  Bilateral pedal pulses intact.  Denies leg pain at present.     -Hyperlipidemia:  Lipid panel in AM.  Cont home medications.     -Prolonged QTc: Avoid any potential QT prolonging medications.     -DVT prophylaxis with SQ Heparin    Pt is at high risk 2/2 severe sepsis, " Mycoplasma pneumonia, COPD exacerbation, acute hypoxic respiratory failure, CAD, PAD and tobacco abuse.     Stephanie Osman PA-C  11/01/17  3:43 PM  ---------------------------------------------------------------------------------------------------------------------     I have reviewed the notes, assessments, and/or procedures performed by Stephanie Osman PA-C. I concur with her/his documentation of Grecia Galicia.    Michael Berry D.O.

## 2017-11-01 NOTE — ED NOTES
Pt resting quietly on stretcher with no complaints.  Pt AAOx4 with no resp distress noted, respirations even and unlabored.  Pt denies any needs at this time.  Skin PWD.  Pt family at bedside. Will continue to monitor and follow plan of care.  Bed rails up x2, bed in lowest position, call light in reach.       Jyotsna Armstrong RN  11/01/17 2744

## 2017-11-01 NOTE — PROGRESS NOTES
Discharge Planning Assessment   David     Patient Name: Grecia Galicia  MRN: [id number]  Today's Date: 11/1/2017    Admit Date: 11/1/2017          Discharge Needs Assessment       11/01/17 1700    Living Environment    Lives With child(camille), adult    Living Arrangements house    Transportation Available car;family or friend will provide    Living Environment    Able to Return to Prior Living Arrangements yes    Discharge Needs Assessment    Concerns To Be Addressed discharge planning concerns    Readmission Within The Last 30 Days no previous admission in last 30 days    Anticipated Changes Related to Illness none    Equipment Currently Used at Home none    Discharge Disposition home or self-care    Discharge Planning Comments SOCIAL SERVICE CONSULT PLACED RT DISCHARGE PLANNING. PT REQUEST INFORMATION ON ADVANCED DIRECTIVES. PT IS BEING ADMITTED TO DR NEWBERRY TO [facility] FOR BILATERAL PNEUMONIA AND HYPOXIIA. PT USES [facility] AND PCP IS  BRETT PENDLETON.   VS: 98.3, 105-110 HR SUSTAINED, 24, 128/74, 89%  CO: SOA [facility]  PTA: NEB X 1  ER TX: NEB X 1, ZITHROMAX IV, ROCEPHIN IV, O2 2L  LABS: BUN 15, WBC 20.21, SPUTUM POSITIVE FOR MYCOPLASMA  ABG: PH 7.53, PCO2 34.7, PO2 45.5  CXRAY: BIBASILAR CONSOLIDATION   HX: PAD, HTN, MI, ASCVD, CAD, COPD, RCA STENT  FLOOR ORDERS: CARDIAC MONITORING, CONT PULSE OX, O2 TITRATE, ABG PRN, EKG PRN, BMP/CBC IN AM, TROPONIN Q 6 HRS, HEPARIN SQ BID, ROCEPHIN IV QD, DOXYCYCLINE IV BID, NEBS 4 X DAY, SOLUMEDROL IV BID, NS 75 ML/HR, MUCINEX PO BID, ASA QD, LIPITOR PO QD, SYMBICORT BID, COREG PO BID, PLAVIX PO QD, DROPLET ISOLATION               Discharge Plan             Discharge Placement                     Demographic Summary       11/01/17 1511    Referral Information    Admission Type inpatient    Arrived From home or self-care    Referral Source admission list;emergency department    Reason For Consult discharge planning    Record  Reviewed history and physical;medical record;patient profile    Primary Care Physician Information    Name BRETT PENDLETON            Functional Status       11/01/17 1659    Functional Status Current    Ambulation 0-->independent    Transferring 0-->independent    Toileting 0-->independent    Bathing 0-->independent    Dressing 0-->independent    Eating 0-->independent    Communication 0-->understands/communicates without difficulty    Functional Status Prior    Ambulation 0-->independent    Transferring 0-->independent    Toileting 0-->independent    Bathing 0-->independent    Dressing 0-->independent    Eating 0-->independent    Communication 0-->understands/communicates without difficulty      11/01/17 1618    Functional Status Current    Ambulation 0-->independent    Transferring 0-->independent    Toileting 0-->independent    Bathing 0-->independent    Dressing 0-->independent    Eating 0-->independent    Communication 0-->understands/communicates without difficulty    Functional Status Prior    Ambulation 0-->independent    Transferring 0-->independent    Toileting 0-->independent    Bathing 0-->independent    Dressing 0-->independent    Eating 0-->independent    Communication 0-->understands/communicates without difficulty            Psychosocial                 Abuse/Neglect                 Legal       11/01/17 1659    Legal    Legal Comments PT DOES NOT HAVE POA , LIVING WILL OR ADVANCED DIRECTIVES.             Substance Abuse                 Patient Forms               Theresa Cuellar RN

## 2017-11-01 NOTE — ED NOTES
[facility] of shortness of breath.  Pt reports that she has been short of breath x 1.5 weeks, went to the doctor today and they gave her a breathing treatment and sent her here.  Pt reports that she has been coughing and has been producing green sputum.     yJotsna Armstrong RN  11/01/17 6710

## 2017-11-01 NOTE — PLAN OF CARE
Problem: Tissue Perfusion, Ineffective Peripheral (Adult)  Goal: Identify Related Risk Factors and Signs and Symptoms  Outcome: Ongoing (interventions implemented as appropriate)  Goal: Adequate Tissue Perfusion  Outcome: Ongoing (interventions implemented as appropriate)

## 2017-11-02 ENCOUNTER — APPOINTMENT (OUTPATIENT)
Dept: CARDIOLOGY | Facility: HOSPITAL | Age: 57
End: 2017-11-02

## 2017-11-02 LAB
ANION GAP SERPL CALCULATED.3IONS-SCNC: 10.9 MMOL/L (ref 3.6–11.2)
BASOPHILS # BLD AUTO: 0.02 10*3/MM3 (ref 0–0.3)
BASOPHILS NFR BLD AUTO: 0.2 % (ref 0–2)
BH CV ECHO MEAS - AO MAX PG (FULL): 2.6 MMHG
BH CV ECHO MEAS - AO MAX PG: 5.2 MMHG
BH CV ECHO MEAS - AO MEAN PG (FULL): 1.5 MMHG
BH CV ECHO MEAS - AO MEAN PG: 2.9 MMHG
BH CV ECHO MEAS - AO ROOT AREA (BSA CORRECTED): 1.9
BH CV ECHO MEAS - AO ROOT AREA: 4.4 CM^2
BH CV ECHO MEAS - AO ROOT DIAM: 2.4 CM
BH CV ECHO MEAS - AO V2 MAX: 114 CM/SEC
BH CV ECHO MEAS - AO V2 MEAN: 83 CM/SEC
BH CV ECHO MEAS - AO V2 VTI: 25.4 CM
BH CV ECHO MEAS - BSA(HAYCOCK): 1.3 M^2
BH CV ECHO MEAS - BSA: 1.2 M^2
BH CV ECHO MEAS - BZI_BMI: 32.2 KILOGRAMS/M^2
BH CV ECHO MEAS - BZI_METRIC_HEIGHT: 122 CM
BH CV ECHO MEAS - BZI_METRIC_WEIGHT: 48 KG
BH CV ECHO MEAS - EDV(CUBED): 45.3 ML
BH CV ECHO MEAS - EDV(MOD-SP4): 46.8 ML
BH CV ECHO MEAS - EDV(TEICH): 53.2 ML
BH CV ECHO MEAS - EF(CUBED): 68.1 %
BH CV ECHO MEAS - EF(TEICH): 60.6 %
BH CV ECHO MEAS - ESV(CUBED): 14.5 ML
BH CV ECHO MEAS - ESV(TEICH): 20.9 ML
BH CV ECHO MEAS - FS: 31.7 %
BH CV ECHO MEAS - IVS/LVPW: 0.88
BH CV ECHO MEAS - IVSD: 0.71 CM
BH CV ECHO MEAS - LA DIMENSION: 2.6 CM
BH CV ECHO MEAS - LA/AO: 1.1
BH CV ECHO MEAS - LV DIASTOLIC VOL/BSA (35-75): 38.6 ML/M^2
BH CV ECHO MEAS - LV MASS(C)D: 72.3 GRAMS
BH CV ECHO MEAS - LV MASS(C)DI: 59.6 GRAMS/M^2
BH CV ECHO MEAS - LV MAX PG: 2.6 MMHG
BH CV ECHO MEAS - LV MEAN PG: 1.5 MMHG
BH CV ECHO MEAS - LV V1 MAX: 80.6 CM/SEC
BH CV ECHO MEAS - LV V1 MEAN: 58.4 CM/SEC
BH CV ECHO MEAS - LV V1 VTI: 18.4 CM
BH CV ECHO MEAS - LVIDD: 3.6 CM
BH CV ECHO MEAS - LVIDS: 2.4 CM
BH CV ECHO MEAS - LVPWD: 0.81 CM
BH CV ECHO MEAS - MV A MAX VEL: 126.6 CM/SEC
BH CV ECHO MEAS - MV E MAX VEL: 122.6 CM/SEC
BH CV ECHO MEAS - MV E/A: 0.97
BH CV ECHO MEAS - PA ACC TIME: 0.22 SEC
BH CV ECHO MEAS - PA PR(ACCEL): -18.2 MMHG
BH CV ECHO MEAS - RAP SYSTOLE: 10 MMHG
BH CV ECHO MEAS - RVDD: 2.3 CM
BH CV ECHO MEAS - RVSP: 25.2 MMHG
BH CV ECHO MEAS - SI(AO): 91.5 ML/M^2
BH CV ECHO MEAS - SI(CUBED): 25.5 ML/M^2
BH CV ECHO MEAS - SI(TEICH): 26.6 ML/M^2
BH CV ECHO MEAS - SV(AO): 110.8 ML
BH CV ECHO MEAS - SV(CUBED): 30.9 ML
BH CV ECHO MEAS - SV(TEICH): 32.3 ML
BH CV ECHO MEAS - TR MAX VEL: 195 CM/SEC
BILIRUB UR QL STRIP: NEGATIVE
BUN BLD-MCNC: 14 MG/DL (ref 7–21)
BUN/CREAT SERPL: 21.5 (ref 7–25)
CALCIUM SPEC-SCNC: 8.8 MG/DL (ref 7.7–10)
CHLORIDE SERPL-SCNC: 100 MMOL/L (ref 99–112)
CLARITY UR: CLEAR
CO2 SERPL-SCNC: 25.1 MMOL/L (ref 24.3–31.9)
COLOR UR: YELLOW
CREAT BLD-MCNC: 0.65 MG/DL (ref 0.43–1.29)
CRP SERPL-MCNC: 24.36 MG/DL (ref 0–0.99)
DEPRECATED RDW RBC AUTO: 43.5 FL (ref 37–54)
EOSINOPHIL # BLD AUTO: 0 10*3/MM3 (ref 0–0.7)
EOSINOPHIL NFR BLD AUTO: 0 % (ref 0–5)
ERYTHROCYTE [DISTWIDTH] IN BLOOD BY AUTOMATED COUNT: 13.2 % (ref 11.5–14.5)
GFR SERPL CREATININE-BSD FRML MDRD: 94 ML/MIN/1.73
GLUCOSE BLD-MCNC: 237 MG/DL (ref 70–110)
GLUCOSE UR STRIP-MCNC: NEGATIVE MG/DL
HCT VFR BLD AUTO: 34.7 % (ref 37–47)
HGB BLD-MCNC: 11.5 G/DL (ref 12–16)
HGB UR QL STRIP.AUTO: NEGATIVE
IMM GRANULOCYTES # BLD: 0.05 10*3/MM3 (ref 0–0.03)
IMM GRANULOCYTES NFR BLD: 0.4 % (ref 0–0.5)
KETONES UR QL STRIP: NEGATIVE
L PNEUMO1 AG UR QL IA: NEGATIVE
LEUKOCYTE ESTERASE UR QL STRIP.AUTO: NEGATIVE
LYMPHOCYTES # BLD AUTO: 0.84 10*3/MM3 (ref 1–3)
LYMPHOCYTES NFR BLD AUTO: 6.4 % (ref 21–51)
MAGNESIUM SERPL-MCNC: 2.1 MG/DL (ref 1.7–2.6)
MAXIMAL PREDICTED HEART RATE: 164 BPM
MCH RBC QN AUTO: 31.3 PG (ref 27–33)
MCHC RBC AUTO-ENTMCNC: 33.1 G/DL (ref 33–37)
MCV RBC AUTO: 94.3 FL (ref 80–94)
MONOCYTES # BLD AUTO: 0.13 10*3/MM3 (ref 0.1–0.9)
MONOCYTES NFR BLD AUTO: 1 % (ref 0–10)
NEUTROPHILS # BLD AUTO: 12.06 10*3/MM3 (ref 1.4–6.5)
NEUTROPHILS NFR BLD AUTO: 92 % (ref 30–70)
NITRITE UR QL STRIP: NEGATIVE
OSMOLALITY SERPL CALC.SUM OF ELEC: 280.1 MOSM/KG (ref 273–305)
PH UR STRIP.AUTO: 5.5 [PH] (ref 5–8)
PLATELET # BLD AUTO: 196 10*3/MM3 (ref 130–400)
PMV BLD AUTO: 12.1 FL (ref 6–10)
POTASSIUM BLD-SCNC: 3.1 MMOL/L (ref 3.5–5.3)
PROT UR QL STRIP: NEGATIVE
RBC # BLD AUTO: 3.68 10*6/MM3 (ref 4.2–5.4)
SODIUM BLD-SCNC: 136 MMOL/L (ref 135–153)
SP GR UR STRIP: 1.01 (ref 1–1.03)
STRESS TARGET HR: 139 BPM
UROBILINOGEN UR QL STRIP: NORMAL
WBC NRBC COR # BLD: 13.1 10*3/MM3 (ref 4.5–12.5)

## 2017-11-02 PROCEDURE — 86140 C-REACTIVE PROTEIN: CPT | Performed by: PHYSICIAN ASSISTANT

## 2017-11-02 PROCEDURE — 80048 BASIC METABOLIC PNL TOTAL CA: CPT | Performed by: PHYSICIAN ASSISTANT

## 2017-11-02 PROCEDURE — 87899 AGENT NOS ASSAY W/OPTIC: CPT | Performed by: PHYSICIAN ASSISTANT

## 2017-11-02 PROCEDURE — G0009 ADMIN PNEUMOCOCCAL VACCINE: HCPCS | Performed by: INTERNAL MEDICINE

## 2017-11-02 PROCEDURE — 25010000002 METHYLPREDNISOLONE PER 40 MG: Performed by: PHYSICIAN ASSISTANT

## 2017-11-02 PROCEDURE — 85025 COMPLETE CBC W/AUTO DIFF WBC: CPT | Performed by: PHYSICIAN ASSISTANT

## 2017-11-02 PROCEDURE — 25010000002 HEPARIN (PORCINE) PER 1000 UNITS: Performed by: PHYSICIAN ASSISTANT

## 2017-11-02 PROCEDURE — 93306 TTE W/DOPPLER COMPLETE: CPT

## 2017-11-02 PROCEDURE — 90732 PPSV23 VACC 2 YRS+ SUBQ/IM: CPT | Performed by: INTERNAL MEDICINE

## 2017-11-02 PROCEDURE — 94799 UNLISTED PULMONARY SVC/PX: CPT

## 2017-11-02 PROCEDURE — G0008 ADMIN INFLUENZA VIRUS VAC: HCPCS | Performed by: INTERNAL MEDICINE

## 2017-11-02 PROCEDURE — 83735 ASSAY OF MAGNESIUM: CPT | Performed by: PHYSICIAN ASSISTANT

## 2017-11-02 PROCEDURE — 25010000002 CEFTRIAXONE PER 250 MG: Performed by: PHYSICIAN ASSISTANT

## 2017-11-02 PROCEDURE — 90686 IIV4 VACC NO PRSV 0.5 ML IM: CPT | Performed by: INTERNAL MEDICINE

## 2017-11-02 PROCEDURE — 25010000002 PNEUMOCOCCAL VAC POLYVALENT PER 0.5 ML: Performed by: INTERNAL MEDICINE

## 2017-11-02 PROCEDURE — 25010000002 INFLUENZA VAC SPLIT QUAD 0.5 ML SUSPENSION PREFILLED SYRINGE: Performed by: INTERNAL MEDICINE

## 2017-11-02 PROCEDURE — 81003 URINALYSIS AUTO W/O SCOPE: CPT | Performed by: PHYSICIAN ASSISTANT

## 2017-11-02 PROCEDURE — 93306 TTE W/DOPPLER COMPLETE: CPT | Performed by: INTERNAL MEDICINE

## 2017-11-02 PROCEDURE — 99233 SBSQ HOSP IP/OBS HIGH 50: CPT | Performed by: INTERNAL MEDICINE

## 2017-11-02 RX ORDER — L.ACID,CASEI/B.ANIMAL/S.THERMO 16B CELL
1 CAPSULE ORAL DAILY
Status: DISCONTINUED | OUTPATIENT
Start: 2017-11-02 | End: 2017-11-04 | Stop reason: HOSPADM

## 2017-11-02 RX ORDER — POTASSIUM CHLORIDE 750 MG/1
40 CAPSULE, EXTENDED RELEASE ORAL AS NEEDED
Status: DISCONTINUED | OUTPATIENT
Start: 2017-11-02 | End: 2017-11-04 | Stop reason: HOSPADM

## 2017-11-02 RX ORDER — MAGNESIUM SULFATE HEPTAHYDRATE 40 MG/ML
2 INJECTION, SOLUTION INTRAVENOUS AS NEEDED
Status: DISCONTINUED | OUTPATIENT
Start: 2017-11-02 | End: 2017-11-04 | Stop reason: HOSPADM

## 2017-11-02 RX ORDER — POTASSIUM CHLORIDE 1.5 G/1.77G
40 POWDER, FOR SOLUTION ORAL AS NEEDED
Status: DISCONTINUED | OUTPATIENT
Start: 2017-11-02 | End: 2017-11-04 | Stop reason: HOSPADM

## 2017-11-02 RX ORDER — MAGNESIUM SULFATE HEPTAHYDRATE 40 MG/ML
4 INJECTION, SOLUTION INTRAVENOUS AS NEEDED
Status: DISCONTINUED | OUTPATIENT
Start: 2017-11-02 | End: 2017-11-04 | Stop reason: HOSPADM

## 2017-11-02 RX ORDER — METHYLPREDNISOLONE SODIUM SUCCINATE 40 MG/ML
20 INJECTION, POWDER, LYOPHILIZED, FOR SOLUTION INTRAMUSCULAR; INTRAVENOUS EVERY 12 HOURS
Status: DISCONTINUED | OUTPATIENT
Start: 2017-11-02 | End: 2017-11-04 | Stop reason: HOSPADM

## 2017-11-02 RX ORDER — POTASSIUM CHLORIDE 7.45 MG/ML
10 INJECTION INTRAVENOUS
Status: DISCONTINUED | OUTPATIENT
Start: 2017-11-02 | End: 2017-11-04 | Stop reason: HOSPADM

## 2017-11-02 RX ORDER — POTASSIUM CHLORIDE 20 MEQ/1
40 TABLET, EXTENDED RELEASE ORAL EVERY 4 HOURS
Status: COMPLETED | OUTPATIENT
Start: 2017-11-02 | End: 2017-11-02

## 2017-11-02 RX ADMIN — CEFTRIAXONE 1 G: 1 INJECTION, SOLUTION INTRAVENOUS at 14:39

## 2017-11-02 RX ADMIN — ASPIRIN 81 MG: 81 TABLET ORAL at 20:50

## 2017-11-02 RX ADMIN — Medication 1 CAPSULE: at 17:43

## 2017-11-02 RX ADMIN — BENZONATATE 200 MG: 100 CAPSULE, LIQUID FILLED ORAL at 14:38

## 2017-11-02 RX ADMIN — GUAIFENESIN AND DEXTROMETHORPHAN 5 ML: 100; 10 SYRUP ORAL at 03:58

## 2017-11-02 RX ADMIN — CARVEDILOL 25 MG: 25 TABLET, FILM COATED ORAL at 17:43

## 2017-11-02 RX ADMIN — IPRATROPIUM BROMIDE AND ALBUTEROL SULFATE 3 ML: .5; 3 SOLUTION RESPIRATORY (INHALATION) at 01:20

## 2017-11-02 RX ADMIN — METHYLPREDNISOLONE SODIUM SUCCINATE 20 MG: 40 INJECTION, POWDER, FOR SOLUTION INTRAMUSCULAR; INTRAVENOUS at 17:43

## 2017-11-02 RX ADMIN — GUAIFENESIN 600 MG: 600 TABLET, EXTENDED RELEASE ORAL at 08:37

## 2017-11-02 RX ADMIN — CARVEDILOL 25 MG: 25 TABLET, FILM COATED ORAL at 08:37

## 2017-11-02 RX ADMIN — GUAIFENESIN 600 MG: 600 TABLET, EXTENDED RELEASE ORAL at 20:50

## 2017-11-02 RX ADMIN — DOXYCYCLINE 100 MG: 100 INJECTION, POWDER, LYOPHILIZED, FOR SOLUTION INTRAVENOUS at 17:43

## 2017-11-02 RX ADMIN — ATORVASTATIN CALCIUM 40 MG: 40 TABLET, FILM COATED ORAL at 20:50

## 2017-11-02 RX ADMIN — METHYLPREDNISOLONE SODIUM SUCCINATE 40 MG: 40 INJECTION, POWDER, FOR SOLUTION INTRAMUSCULAR; INTRAVENOUS at 03:59

## 2017-11-02 RX ADMIN — BUDESONIDE AND FORMOTEROL FUMARATE DIHYDRATE 2 PUFF: 160; 4.5 AEROSOL RESPIRATORY (INHALATION) at 07:03

## 2017-11-02 RX ADMIN — IPRATROPIUM BROMIDE AND ALBUTEROL SULFATE 3 ML: .5; 3 SOLUTION RESPIRATORY (INHALATION) at 19:10

## 2017-11-02 RX ADMIN — PNEUMOCOCCAL VACCINE POLYVALENT 0.5 ML
25; 25; 25; 25; 25; 25; 25; 25; 25; 25; 25; 25; 25; 25; 25; 25; 25; 25; 25; 25; 25; 25; 25 INJECTION, SOLUTION INTRAMUSCULAR; SUBCUTANEOUS at 08:45

## 2017-11-02 RX ADMIN — DOXYCYCLINE 100 MG: 100 INJECTION, POWDER, LYOPHILIZED, FOR SOLUTION INTRAVENOUS at 03:58

## 2017-11-02 RX ADMIN — GUAIFENESIN AND DEXTROMETHORPHAN 5 ML: 100; 10 SYRUP ORAL at 22:05

## 2017-11-02 RX ADMIN — BENZONATATE 200 MG: 100 CAPSULE, LIQUID FILLED ORAL at 03:58

## 2017-11-02 RX ADMIN — HEPARIN SODIUM 5000 UNITS: 5000 INJECTION, SOLUTION INTRAVENOUS; SUBCUTANEOUS at 20:50

## 2017-11-02 RX ADMIN — IPRATROPIUM BROMIDE AND ALBUTEROL SULFATE 3 ML: .5; 3 SOLUTION RESPIRATORY (INHALATION) at 12:23

## 2017-11-02 RX ADMIN — IPRATROPIUM BROMIDE AND ALBUTEROL SULFATE 3 ML: .5; 3 SOLUTION RESPIRATORY (INHALATION) at 06:48

## 2017-11-02 RX ADMIN — BUDESONIDE AND FORMOTEROL FUMARATE DIHYDRATE 2 PUFF: 160; 4.5 AEROSOL RESPIRATORY (INHALATION) at 19:21

## 2017-11-02 RX ADMIN — BENZONATATE 200 MG: 100 CAPSULE, LIQUID FILLED ORAL at 22:07

## 2017-11-02 RX ADMIN — GUAIFENESIN AND DEXTROMETHORPHAN 5 ML: 100; 10 SYRUP ORAL at 10:35

## 2017-11-02 RX ADMIN — HEPARIN SODIUM 5000 UNITS: 5000 INJECTION, SOLUTION INTRAVENOUS; SUBCUTANEOUS at 08:37

## 2017-11-02 RX ADMIN — POTASSIUM CHLORIDE 40 MEQ: 1500 TABLET, EXTENDED RELEASE ORAL at 14:39

## 2017-11-02 RX ADMIN — CLOPIDOGREL 75 MG: 75 TABLET, FILM COATED ORAL at 20:50

## 2017-11-02 RX ADMIN — CETIRIZINE HYDROCHLORIDE 10 MG: 10 TABLET ORAL at 08:37

## 2017-11-02 RX ADMIN — POTASSIUM CHLORIDE 40 MEQ: 1500 TABLET, EXTENDED RELEASE ORAL at 08:48

## 2017-11-02 RX ADMIN — POTASSIUM CHLORIDE 40 MEQ: 1500 TABLET, EXTENDED RELEASE ORAL at 17:43

## 2017-11-02 RX ADMIN — SODIUM CHLORIDE 75 ML/HR: 9 INJECTION, SOLUTION INTRAVENOUS at 10:35

## 2017-11-02 RX ADMIN — INFLUENZA VIRUS VACCINE 0.5 ML: 15; 15; 15; 15 SUSPENSION INTRAMUSCULAR at 08:44

## 2017-11-02 NOTE — PROGRESS NOTES
"Discharge Planning Assessment  [facility]     Patient Name: Grecia Galicia  MRN: [id number]  Today's Date: 11/2/2017    Admit Date: 11/1/2017    Discharge Plan       11/02/17 1245    Case Management/Social Work Plan    Plan SS recieved consult \"discharge planning (info on advanced directives).\" Pt was seen in the ED by RN, Jaimee Cuellar. SS spoke to pt on this date. Pt lives at home with her spouse, Jackson and daughter, Lorna. Pt does not utilize home health services or DME. Pt does not currently have a POA or advance directive and states she is not interested in information at this time. Pt utilizes [facility] for prescriptions. Pt sees BUDDY Cutler. Pt's family to provide transportation at discharge. SS will follow and assist as needed with discharge planning.    Patient/Family In Agreement With Plan yes     Radha Mojica    "

## 2017-11-02 NOTE — PLAN OF CARE
Problem: Patient Care Overview (Adult)  Goal: Plan of Care Review  Outcome: Ongoing (interventions implemented as appropriate)  Goal: Adult Individualization and Mutuality  Outcome: Ongoing (interventions implemented as appropriate)    Problem: Tissue Perfusion, Ineffective Peripheral (Adult)  Goal: Identify Related Risk Factors and Signs and Symptoms  Outcome: Ongoing (interventions implemented as appropriate)  Goal: Adequate Tissue Perfusion  Outcome: Ongoing (interventions implemented as appropriate)    Problem: Respiratory Insufficiency (Adult)  Goal: Identify Related Risk Factors and Signs and Symptoms  Outcome: Ongoing (interventions implemented as appropriate)  Goal: Acid/Base Balance  Outcome: Ongoing (interventions implemented as appropriate)  Goal: Effective Ventilation  Outcome: Ongoing (interventions implemented as appropriate)

## 2017-11-02 NOTE — PLAN OF CARE
Problem: Patient Care Overview (Adult)  Goal: Plan of Care Review  Outcome: Ongoing (interventions implemented as appropriate)  Goal: Adult Individualization and Mutuality  Outcome: Ongoing (interventions implemented as appropriate)  Goal: Discharge Needs Assessment  Outcome: Ongoing (interventions implemented as appropriate)    Problem: Tissue Perfusion, Ineffective Peripheral (Adult)  Goal: Identify Related Risk Factors and Signs and Symptoms  Outcome: Ongoing (interventions implemented as appropriate)  Goal: Adequate Tissue Perfusion  Outcome: Ongoing (interventions implemented as appropriate)    Problem: Respiratory Insufficiency (Adult)  Goal: Identify Related Risk Factors and Signs and Symptoms  Outcome: Ongoing (interventions implemented as appropriate)  Goal: Acid/Base Balance  Outcome: Ongoing (interventions implemented as appropriate)  Goal: Effective Ventilation  Outcome: Ongoing (interventions implemented as appropriate)

## 2017-11-02 NOTE — PROGRESS NOTES
Patient Identification:  Name:  Grecia Galicia  Age:  57 y.o.  Sex:  female  :  1960  MRN:  [id number]  Visit Number:  [id number]  Primary Care Provider:  BUDDY Cutler    Length of stay:  1    HPI:   Mrs. Galicia is a pleasant woman of 57-years-old admitted yesterday with severe sepsis, bilateral pneumonia, and she was found to be Mycoplasma positive.      Subjective:      Mrs. Galicia is sitting up in bed this morning. She reports she feels that her breathing is improving. She continues to cough but feels her mucus is thinning to some degree.     I was accompanied by ANGEL Nolan during my encounter. Pt reports mild improvement in her symptoms today. Reports improvement in her cough with less sputum production. Denies CP. Denies fever or chills. RN reports supplemental O2 was turned up to 3L's. No acute events overnight per RN.     ----------------------------------------------------------------------------------------------------------------------  Current Hospital Meds:    aspirin 81 mg Oral Nightly   atorvastatin 40 mg Oral Nightly   budesonide-formoterol 2 puff Inhalation BID - RT   carvedilol 25 mg Oral BID With Meals   ceftriaxone 1 g Intravenous Q24H   cetirizine 10 mg Oral Daily   clopidogrel 75 mg Oral Nightly   doxycycline 100 mg Intravenous Q12H   guaiFENesin 600 mg Oral Q12H   heparin (porcine) 5,000 Units Subcutaneous Q12H   influenza vaccine 0.5 mL Intramuscular Once   ipratropium-albuterol 3 mL Nebulization 4x Daily - RT   methylPREDNISolone sodium succinate 40 mg Intravenous Q12H   pneumococcal polysaccharide 23-valent 0.5 mL Intramuscular Once   potassium chloride 40 mEq Oral Q4H       sodium chloride 75 mL/hr Last Rate: 75 mL/hr (17 1728)     ----------------------------------------------------------------------------------------------------------------------  Vital Signs:  Temp:  [98.3 °F (36.8 °C)-99.3 °F (37.4 °C)] 98.7 °F (37.1 °C)  Heart Rate:  []  84  Resp:  [18-24] 20  BP: (103-128)/(55-81) 110/55  Last 3 weights    11/01/17  1246 11/02/17  0352   Weight: 90 lb (40.8 kg) 105 lb 8 oz (47.9 kg)     Body mass index is 32.19 kg/(m^2).    Intake/Output Summary (Last 24 hours) at 11/02/17 0830  Last data filed at 11/02/17 0352   Gross per 24 hour   Intake              240 ml   Output                0 ml   Net              240 ml        Diet Regular; Cardiac  ----------------------------------------------------------------------------------------------------------------------  Physical exam:  Constitutional: No acute distress.  Chronically ill appearing. Appears much older than stated age.   HENT:  Head:  Normocephalic and atraumatic.  Mouth:  Moist mucous membranes.    Eyes:  Conjunctivae and EOM are normal.  Pupils are equal, round, and reactive to light.  No scleral icterus.    Neck:  Neck supple.  No JVD present.    Cardiovascular:  Normal rate, regular rhythm. S1/S2 appreciated with no murmur.  Pulmonary/Chest:  Diminished breath sounds throughout.  No significant rhonchi, rales, or wheezing appreciated.    Abdominal:  Soft.  Bowel sounds are normal.  No distension and no tenderness.   Musculoskeletal:  No edema, no tenderness, and no deformity.    Neurological:  Alert and oriented to person, place, and time.  No facial droop.  No slurred speech.   Skin:  Skin is warm and dry. No rash noted. No pallor.   ----------------------------------------------------------------------------------------------------------------------  Tele:  SR 80s and 90s through the evening.     ----------------------------------------------------------------------------------------------------------------------    Results from last 7 days  Lab Units 11/01/17  1314   TROPONIN I ng/mL <0.006       Results from last 7 days  Lab Units 11/02/17  0501 11/01/17  1314   CRP mg/dL 24.36* 25.22*   LACTATE mmol/L  --  1.3   WBC 10*3/mm3 13.10* 20.21*   HEMOGLOBIN g/dL 11.5* 12.9   HEMATOCRIT %  34.7* 38.7   MCV fL 94.3* 93.5   MCHC g/dL 33.1 33.3   PLATELETS 10*3/mm3 196 214       Results from last 7 days  Lab Units 11/01/17  1303   PH, ARTERIAL pH units 7.536*   PO2 ART mm Hg 45.5*   PCO2, ARTERIAL mm Hg 34.7*   HCO3 ART mmol/L 28.7*       Results from last 7 days  Lab Units 11/02/17  0501 11/01/17  1314   SODIUM mmol/L 136 137   POTASSIUM mmol/L 3.1* 3.5   CHLORIDE mmol/L 100 95*   CO2 mmol/L 25.1 30.9   BUN mg/dL 14 15   CREATININE mg/dL 0.65 0.72   EGFR IF NONAFRICN AM mL/min/1.73 94 84   CALCIUM mg/dL 8.8 9.9   GLUCOSE mg/dL 237* 111*   ALBUMIN g/dL  --  4.20   BILIRUBIN mg/dL  --  0.6   ALK PHOS U/L  --  174*   AST (SGOT) U/L  --  30   ALT (SGPT) U/L  --  33   Estimated Creatinine Clearance: 73.1 mL/min (by C-G formula based on Cr of 0.65).  No results found for: AMMONIA      Blood Culture   Date Value Ref Range Status   11/01/2017 No growth at less than 24 hours  Preliminary   11/01/2017 No growth at less than 24 hours  Preliminary   11/01/2017 No growth at less than 24 hours  Preliminary   11/01/2017 No growth at less than 24 hours  Preliminary              ----------------------------------------------------------------------------------------------------------------------  Imaging Results (last 24 hours)     Procedure Component Value Units Date/Time    XR Chest 2 View [[id number]] Collected:  11/01/17 1347     Updated:  11/01/17 1426    Narrative:       EXAMINATION: XR CHEST 2 VW-      CLINICAL INDICATION: Simple sepsis protocol          COMPARISON: 10/1/2014      TECHNIQUE: XR CHEST 2 VW-      FINDINGS:   Diffuse interstitial markings and minimal bibasilar consolidation   Heart and mediastinal contours are unremarkable.   No pneumothorax.   Bony and soft tissue structures are unremarkable.            Impression:       Bibasilar consolidation     This report was finalized on 11/1/2017 1:47 PM by Dr. Edgar Soler MD.            ----------------------------------------------------------------------------------------------------------------------  Assessment and Plan:    -Severe sepsis with bibasilar pneumonia and acute hypoxic respiratory failure: Continue IV Rocephin and Doxycycline.  Mycoplasma IgM +.  Sputum culture pending.  Blood cultures without growth.  Very mild improvement in CRP. WBC much improved today. Urinalysis urnemarkable. Cont to follow cultures and repeat labs in the AM.     -Bibasilar pneumonia, Mycoplasma (+):  Continue with IV Rocehpin and IV doxycycline.  Legionella negative.  Droplet precuations ordered.  Continue with Mucinex. Repeat CXR in the AM.     -Acute hypoxic respiratory failure: Continue with supplemental oxygen and continuous pulse oximetry.  IV antibiotics as previously outlined. Cont steroids for treatment of COPD exacerbation.  Echocardiogram ordered to further assess dyspnea.     -Acute exacerbation of COPD: IV Methylprednisolone decreased to 20mg BID on this date. Continue with duoneb treatments and Mucinex.  PRN Tessalon perles added yesterday in addition to Robitussin which appear to be helping with her cough.      -Hypertension, controlled: Home Coreg with holding parameters.      -Hypokalemia:  Electrolyte replacement protocol has been ordered.  Mg is 2.1. Repeat labs in the AM.     -Hyperlipidemia: Continue Atorvastatin 40mg.  Lipid panel in AM.     -CAD s/p RCA stent, stable without complaints of chest pain: Continue Plavix, ASA, Coreg, and Atorvastatin with telemetry monitoring.      -Prolonged QTc:  Avoid potential QT prolonging medications    -DVT prophylaxis with SQ Heparin    The patient is high risk due to the following diagnoses/reasons:  Severe sepsis, mycoplasma pneumonia, COPD exacerbation, acute hypoxic respiratory failure, CAD, PAD, and tobacco abuse.    Stephanie Osman PA-C  11/02/17  8:30 AM    I have reviewed the notes, assessments, and/or procedures performed by Stephanie  REYNALDO Osman. I concur with her/his documentation of Grecia Galicia.    Michael Berry D.O.

## 2017-11-03 ENCOUNTER — APPOINTMENT (OUTPATIENT)
Dept: GENERAL RADIOLOGY | Facility: HOSPITAL | Age: 57
End: 2017-11-03

## 2017-11-03 LAB
ALBUMIN SERPL-MCNC: 3.7 G/DL (ref 3.5–5)
ALBUMIN/GLOB SERPL: 1.2 G/DL (ref 1.5–2.5)
ALP SERPL-CCNC: 173 U/L (ref 35–104)
ALT SERPL W P-5'-P-CCNC: 54 U/L (ref 10–36)
ANION GAP SERPL CALCULATED.3IONS-SCNC: 3.8 MMOL/L (ref 3.6–11.2)
AST SERPL-CCNC: 55 U/L (ref 10–30)
BACTERIA SPEC RESP CULT: NORMAL
BILIRUB SERPL-MCNC: 0.2 MG/DL (ref 0.2–1.8)
BUN BLD-MCNC: 17 MG/DL (ref 7–21)
BUN/CREAT SERPL: 33.3 (ref 7–25)
CALCIUM SPEC-SCNC: 9.4 MG/DL (ref 7.7–10)
CHLORIDE SERPL-SCNC: 111 MMOL/L (ref 99–112)
CHOLEST SERPL-MCNC: 80 MG/DL (ref 0–200)
CO2 SERPL-SCNC: 24.2 MMOL/L (ref 24.3–31.9)
CREAT BLD-MCNC: 0.51 MG/DL (ref 0.43–1.29)
CRP SERPL-MCNC: 14.93 MG/DL (ref 0–0.99)
DEPRECATED RDW RBC AUTO: 46.4 FL (ref 37–54)
ERYTHROCYTE [DISTWIDTH] IN BLOOD BY AUTOMATED COUNT: 13.6 % (ref 11.5–14.5)
GFR SERPL CREATININE-BSD FRML MDRD: 125 ML/MIN/1.73
GLOBULIN UR ELPH-MCNC: 3 GM/DL
GLUCOSE BLD-MCNC: 114 MG/DL (ref 70–110)
GRAM STN SPEC: NORMAL
HCT VFR BLD AUTO: 34.8 % (ref 37–47)
HDLC SERPL-MCNC: 13 MG/DL (ref 60–100)
HGB BLD-MCNC: 11.3 G/DL (ref 12–16)
LDLC SERPL CALC-MCNC: 46 MG/DL (ref 0–100)
LDLC/HDLC SERPL: 3.52 {RATIO}
LYMPHOCYTES # BLD MANUAL: 0.89 10*3/MM3 (ref 1–3)
LYMPHOCYTES NFR BLD MANUAL: 1 % (ref 0–10)
LYMPHOCYTES NFR BLD MANUAL: 4 % (ref 21–51)
MAGNESIUM SERPL-MCNC: 2.2 MG/DL (ref 1.7–2.6)
MCH RBC QN AUTO: 30.5 PG (ref 27–33)
MCHC RBC AUTO-ENTMCNC: 32.5 G/DL (ref 33–37)
MCV RBC AUTO: 93.8 FL (ref 80–94)
MONOCYTES # BLD AUTO: 0.22 10*3/MM3 (ref 0.1–0.9)
NEUTROPHILS # BLD AUTO: 21.19 10*3/MM3 (ref 1.4–6.5)
NEUTROPHILS NFR BLD MANUAL: 95 % (ref 30–70)
OSMOLALITY SERPL CALC.SUM OF ELEC: 279.9 MOSM/KG (ref 273–305)
PLAT MORPH BLD: NORMAL
PLATELET # BLD AUTO: 266 10*3/MM3 (ref 130–400)
PMV BLD AUTO: 12.5 FL (ref 6–10)
POTASSIUM BLD-SCNC: 4.7 MMOL/L (ref 3.5–5.3)
PROT SERPL-MCNC: 6.7 G/DL (ref 6–8)
RBC # BLD AUTO: 3.71 10*6/MM3 (ref 4.2–5.4)
RBC MORPH BLD: NORMAL
SCAN SLIDE: NORMAL
SODIUM BLD-SCNC: 139 MMOL/L (ref 135–153)
TRIGL SERPL-MCNC: 106 MG/DL (ref 0–150)
VLDLC SERPL-MCNC: 21.2 MG/DL
WBC NRBC COR # BLD: 22.31 10*3/MM3 (ref 4.5–12.5)

## 2017-11-03 PROCEDURE — 80053 COMPREHEN METABOLIC PANEL: CPT | Performed by: INTERNAL MEDICINE

## 2017-11-03 PROCEDURE — 83735 ASSAY OF MAGNESIUM: CPT | Performed by: INTERNAL MEDICINE

## 2017-11-03 PROCEDURE — 80061 LIPID PANEL: CPT | Performed by: PHYSICIAN ASSISTANT

## 2017-11-03 PROCEDURE — 94799 UNLISTED PULMONARY SVC/PX: CPT

## 2017-11-03 PROCEDURE — 71010 HC CHEST AP: CPT

## 2017-11-03 PROCEDURE — 25010000002 CEFTRIAXONE PER 250 MG: Performed by: PHYSICIAN ASSISTANT

## 2017-11-03 PROCEDURE — 85025 COMPLETE CBC W/AUTO DIFF WBC: CPT | Performed by: INTERNAL MEDICINE

## 2017-11-03 PROCEDURE — 25010000002 METHYLPREDNISOLONE PER 40 MG: Performed by: PHYSICIAN ASSISTANT

## 2017-11-03 PROCEDURE — 71010 XR CHEST AP: CPT | Performed by: RADIOLOGY

## 2017-11-03 PROCEDURE — 25010000002 HEPARIN (PORCINE) PER 1000 UNITS: Performed by: PHYSICIAN ASSISTANT

## 2017-11-03 PROCEDURE — 99233 SBSQ HOSP IP/OBS HIGH 50: CPT | Performed by: INTERNAL MEDICINE

## 2017-11-03 PROCEDURE — 86140 C-REACTIVE PROTEIN: CPT | Performed by: PHYSICIAN ASSISTANT

## 2017-11-03 PROCEDURE — 85007 BL SMEAR W/DIFF WBC COUNT: CPT | Performed by: INTERNAL MEDICINE

## 2017-11-03 RX ADMIN — IPRATROPIUM BROMIDE AND ALBUTEROL SULFATE 3 ML: .5; 3 SOLUTION RESPIRATORY (INHALATION) at 19:31

## 2017-11-03 RX ADMIN — HEPARIN SODIUM 5000 UNITS: 5000 INJECTION, SOLUTION INTRAVENOUS; SUBCUTANEOUS at 20:39

## 2017-11-03 RX ADMIN — IPRATROPIUM BROMIDE AND ALBUTEROL SULFATE 3 ML: .5; 3 SOLUTION RESPIRATORY (INHALATION) at 13:00

## 2017-11-03 RX ADMIN — METHYLPREDNISOLONE SODIUM SUCCINATE 20 MG: 40 INJECTION, POWDER, FOR SOLUTION INTRAMUSCULAR; INTRAVENOUS at 05:36

## 2017-11-03 RX ADMIN — METHYLPREDNISOLONE SODIUM SUCCINATE 20 MG: 40 INJECTION, POWDER, FOR SOLUTION INTRAMUSCULAR; INTRAVENOUS at 17:23

## 2017-11-03 RX ADMIN — IPRATROPIUM BROMIDE AND ALBUTEROL SULFATE 3 ML: .5; 3 SOLUTION RESPIRATORY (INHALATION) at 06:33

## 2017-11-03 RX ADMIN — CARVEDILOL 25 MG: 25 TABLET, FILM COATED ORAL at 08:03

## 2017-11-03 RX ADMIN — GUAIFENESIN 600 MG: 600 TABLET, EXTENDED RELEASE ORAL at 20:39

## 2017-11-03 RX ADMIN — GUAIFENESIN AND DEXTROMETHORPHAN 5 ML: 100; 10 SYRUP ORAL at 18:33

## 2017-11-03 RX ADMIN — DOXYCYCLINE 100 MG: 100 INJECTION, POWDER, LYOPHILIZED, FOR SOLUTION INTRAVENOUS at 17:23

## 2017-11-03 RX ADMIN — CEFTRIAXONE 1 G: 1 INJECTION, SOLUTION INTRAVENOUS at 13:08

## 2017-11-03 RX ADMIN — SODIUM CHLORIDE 60 ML/HR: 9 INJECTION, SOLUTION INTRAVENOUS at 03:21

## 2017-11-03 RX ADMIN — CETIRIZINE HYDROCHLORIDE 10 MG: 10 TABLET ORAL at 08:02

## 2017-11-03 RX ADMIN — ASPIRIN 81 MG: 81 TABLET ORAL at 22:02

## 2017-11-03 RX ADMIN — Medication 1 CAPSULE: at 08:02

## 2017-11-03 RX ADMIN — BENZONATATE 200 MG: 100 CAPSULE, LIQUID FILLED ORAL at 20:39

## 2017-11-03 RX ADMIN — IPRATROPIUM BROMIDE AND ALBUTEROL SULFATE 3 ML: .5; 3 SOLUTION RESPIRATORY (INHALATION) at 01:05

## 2017-11-03 RX ADMIN — GUAIFENESIN AND DEXTROMETHORPHAN 5 ML: 100; 10 SYRUP ORAL at 10:42

## 2017-11-03 RX ADMIN — BUDESONIDE AND FORMOTEROL FUMARATE DIHYDRATE 2 PUFF: 160; 4.5 AEROSOL RESPIRATORY (INHALATION) at 06:33

## 2017-11-03 RX ADMIN — BUDESONIDE AND FORMOTEROL FUMARATE DIHYDRATE 2 PUFF: 160; 4.5 AEROSOL RESPIRATORY (INHALATION) at 19:39

## 2017-11-03 RX ADMIN — GUAIFENESIN 600 MG: 600 TABLET, EXTENDED RELEASE ORAL at 08:02

## 2017-11-03 RX ADMIN — ATORVASTATIN CALCIUM 40 MG: 40 TABLET, FILM COATED ORAL at 20:39

## 2017-11-03 RX ADMIN — DOXYCYCLINE 100 MG: 100 INJECTION, POWDER, LYOPHILIZED, FOR SOLUTION INTRAVENOUS at 05:36

## 2017-11-03 RX ADMIN — CARVEDILOL 25 MG: 25 TABLET, FILM COATED ORAL at 17:23

## 2017-11-03 RX ADMIN — HEPARIN SODIUM 5000 UNITS: 5000 INJECTION, SOLUTION INTRAVENOUS; SUBCUTANEOUS at 08:02

## 2017-11-03 RX ADMIN — CLOPIDOGREL 75 MG: 75 TABLET, FILM COATED ORAL at 20:39

## 2017-11-03 NOTE — NURSING NOTE
Patient's emergency contact Lorna Abdi notified via telephone of patient's transfer from room 310B to room 324 at this time.

## 2017-11-03 NOTE — PROGRESS NOTES
Patient Identification:  Name:  Grecia Galicia  Age:  57 y.o.  Sex:  female  :  1960  MRN:  [id number]  Visit Number:  [id number]  Primary Care Provider:  BUDDY Cutler    Length of stay:  2    HPI:   Mrs. Galicia is a pleasant woman of 57-years-old admitted yesterday with severe sepsis, bilateral pneumonia, and she was found to be Mycoplasma positive.      Subjective:      Mrs. Galicia is sitting up in bed.  She continues to feel improved from the respiratory standpoint.  She reports an unexpected episode of loose stool this morning.  She denies nausea and vomiting.      I was accompanied by ANGEL Wesley during my encounter. Pt currently resting comfortably in bed. Appears clinically improved today. Reports significant improvement in her cough and dyspnea. Denies CP. Denies fever or chills. States she was able to shower by herself today. Episode of loose stools this AM. No acute events overnight per RN.     ----------------------------------------------------------------------------------------------------------------------  Current Hospital Meds:    aspirin 81 mg Oral Nightly   atorvastatin 40 mg Oral Nightly   budesonide-formoterol 2 puff Inhalation BID - RT   carvedilol 25 mg Oral BID With Meals   ceftriaxone 1 g Intravenous Q24H   cetirizine 10 mg Oral Daily   clopidogrel 75 mg Oral Nightly   doxycycline 100 mg Intravenous Q12H   guaiFENesin 600 mg Oral Q12H   heparin (porcine) 5,000 Units Subcutaneous Q12H   ipratropium-albuterol 3 mL Nebulization 4x Daily - RT   methylPREDNISolone sodium succinate 20 mg Intravenous Q12H   Risaquad-2 1 capsule Oral Daily       sodium chloride 60 mL/hr Last Rate: 60 mL/hr (17 0321)     ----------------------------------------------------------------------------------------------------------------------  Vital Signs:  Temp:  [97.6 °F (36.4 °C)-97.9 °F (36.6 °C)] 97.8 °F (36.6 °C)  Heart Rate:  [84-99] 94  Resp:  [18-20] 18  BP: (114-132)/(60-93)  119/93  Last 3 weights    11/01/17  1246 11/02/17  0352 11/03/17  0431   Weight: 90 lb (40.8 kg) 105 lb 8 oz (47.9 kg) 106 lb 3.2 oz (48.2 kg)     Body mass index is 32.41 kg/(m^2).    Intake/Output Summary (Last 24 hours) at 11/03/17 1019  Last data filed at 11/03/17 0900   Gross per 24 hour   Intake             2840 ml   Output              200 ml   Net             2640 ml     I/O this shift:  In: 360 [P.O.:360]  Out: -   Diet Regular; Cardiac  ----------------------------------------------------------------------------------------------------------------------  Physical exam:  Constitutional: Appears much older than stated age.  Chronically ill-appearing.   HENT:  Head:  Normocephalic and atraumatic.  Mouth:  Moist mucous membranes.    Eyes:  Conjunctivae and EOM are normal.  Pupils are equal, round, and reactive to light.  No scleral icterus.    Neck:  Neck supple.  No JVD present.    Cardiovascular:  Normal rate, regular rhythm and S1/S2 appreciated without murmur.  Pulmonary/Chest: Aeration much improved today. No wheezes, rales or rhonchi appreciated.    Abdominal:  Soft.  Bowel sounds are normal.  No distension and no tenderness.   Musculoskeletal:  No edema, no tenderness, and no deformity.   Neurological:  Alert and oriented to person, place, and time.  Baseline speech impediment appreciated.   Skin:  Skin is warm and dry. No rash noted. No pallor.   ----------------------------------------------------------------------------------------------------------------------  Tele:  SR in the 80s-90s  ----------------------------------------------------------------------------------------------------------------------    Results from last 7 days  Lab Units 11/01/17  1314   TROPONIN I ng/mL <0.006       Results from last 7 days  Lab Units 11/03/17  0129 11/02/17  0501 11/01/17  1314   CRP mg/dL 14.93* 24.36* 25.22*   LACTATE mmol/L  --   --  1.3   WBC 10*3/mm3 22.31* 13.10* 20.21*   HEMOGLOBIN g/dL 11.3* 11.5* 12.9    HEMATOCRIT % 34.8* 34.7* 38.7   MCV fL 93.8 94.3* 93.5   MCHC g/dL 32.5* 33.1 33.3   PLATELETS 10*3/mm3 266 196 214       Results from last 7 days  Lab Units 11/01/17  1303   PH, ARTERIAL pH units 7.536*   PO2 ART mm Hg 45.5*   PCO2, ARTERIAL mm Hg 34.7*   HCO3 ART mmol/L 28.7*       Results from last 7 days  Lab Units 11/03/17  0129 11/02/17  0501 11/01/17  1314   SODIUM mmol/L 139 136 137   POTASSIUM mmol/L 4.7 3.1* 3.5   MAGNESIUM mg/dL 2.2 2.1  --    CHLORIDE mmol/L 111 100 95*   CO2 mmol/L 24.2* 25.1 30.9   BUN mg/dL 17 14 15   CREATININE mg/dL 0.51 0.65 0.72   EGFR IF NONAFRICN AM mL/min/1.73 125 94 84   CALCIUM mg/dL 9.4 8.8 9.9   GLUCOSE mg/dL 114* 237* 111*   ALBUMIN g/dL 3.70  --  4.20   BILIRUBIN mg/dL 0.2  --  0.6   ALK PHOS U/L 173*  --  174*   AST (SGOT) U/L 55*  --  30   ALT (SGPT) U/L 54*  --  33   Estimated Creatinine Clearance: 93.7 mL/min (by C-G formula based on Cr of 0.51).  No results found for: AMMONIA    Results from last 7 days  Lab Units 11/03/17  0129   CHOLESTEROL mg/dL 80   TRIGLYCERIDES mg/dL 106   HDL CHOL mg/dL 13*     Blood Culture   Date Value Ref Range Status   11/01/2017 No growth at 24 hours  Preliminary   11/01/2017 No growth at 24 hours  Preliminary   11/01/2017 No growth at 24 hours  Preliminary   11/01/2017 No growth at 24 hours  Preliminary              ----------------------------------------------------------------------------------------------------------------------  Imaging Results (last 24 hours)     Procedure Component Value Units Date/Time    XR Chest AP [[id number]] Collected:  11/03/17 0758     Updated:  11/03/17 0859    Narrative:       XR CHEST AP-     HISTORY:  Pneumonia; J18.9-Pneumonia, unspecified organism; J96.01-Acute  respiratory failure with hypoxia          COMPARISON: 11/01/2017      TECHNIQUE: Single frontal view of the chest.     FINDINGS:     Diffuse increased interstitial lung disease.  The cardiac silhouette is normal. The pulmonary vasculature  is  unremarkable.  There is no evidence of an acute osseous abnormality.   There are no suspicious-appearing parenchymal soft tissue nodules.            Impression:       Interstitial lung disease.         This report was finalized on 11/3/2017 8:57 AM by Dr. Edgar Soler MD.           ----------------------------------------------------------------------------------------------------------------------  Assessment and Plan:  -Severe sepsis with bibasilar pneumonia and acute hypoxic respiratory failure: Continue IV Rocephin and Doxycycline.  Mycoplasma IgM +.  Sputum culture with grown of normal respiratory terrie.  Blood cultures without growth. WBC worse today, possibly 2/2 steroids. CRP improved. Urinalysis urnemarkable. Cont to follow cultures and repeat labs in the AM. Stool cultures and C.diff testing added on this date given reports of loose bowel movements.       -Bibasilar pneumonia, Mycoplasma (+):  Continue with IV Rocehpin and IV doxycycline.  Legionella negative.  Droplet precuations ordered.  Continue with Mucinex. Repeat CXR with evidence of interstitial lung disease and without evidence of acute infiltrate.      -Diarrhea:  Possibly 2/2 antibiotic use.  Stool studies added.  Probiotic in place on current medications.      -Worsening leukocytosis:  Possibly steroid induced.  CRP is improving.  Stool and Cdiff studies pending.      -Acute hypoxic respiratory failure: Continue with supplemental oxygen and continuous pulse oximetry.  IV antibiotics as previously outlined. Cont steroids for treatment of COPD exacerbation.  Echocardiogram reviewed with EF 55-60% in addition to trace mitral valve and tricuspid valve regurgitation.      -Acute exacerbation of COPD: IV Methylprednisolone decreased to 20mg BID on this date. Continue with duoneb treatments and Mucinex.  PRN Tessalon perles added yesterday in addition to Robitussin which appear to be helping with her cough.       -Hypertension, controlled: Home  Coreg with holding parameters.       -Hypokalemia,improved:  Electrolyte replacement protocol has been ordered.  Mg is 2.2. Repeat labs in the AM.      -Hyperlipidemia: Continue Atorvastatin 40mg.  Lipid panel reviewed.      -CAD s/p RCA stent, stable without complaints of chest pain: Continue Plavix, ASA, Coreg, and Atorvastatin with telemetry monitoring.       -Prolonged QTc:  Avoid potential QT prolonging medications     -DVT prophylaxis with SQ Heparin    Disposition: Anticipate possible D/C in 24 hours if she continues to clinically improve.      The patient is high risk due to the following diagnoses/reasons:  Severe sepsis, mycoplasma pneumonia, COPD exacerbation, acute hypoxic respiratory failure, CAD, PAD, and tobacco abuse.      Stephanie Osman PA-C  11/03/17  10:19 AM     I have reviewed the notes, assessments, and/or procedures performed by Stephanie Osman PA-C. I concur with her/his documentation of Grecia Galicia.    Michael Berry D.O.

## 2017-11-04 VITALS
HEART RATE: 72 BPM | SYSTOLIC BLOOD PRESSURE: 134 MMHG | TEMPERATURE: 97 F | OXYGEN SATURATION: 95 % | HEIGHT: 55 IN | RESPIRATION RATE: 18 BRPM | WEIGHT: 114.25 LBS | DIASTOLIC BLOOD PRESSURE: 93 MMHG | BODY MASS INDEX: 26.44 KG/M2

## 2017-11-04 LAB
ALBUMIN SERPL-MCNC: 3.6 G/DL (ref 3.5–5)
ALBUMIN/GLOB SERPL: 1.1 G/DL (ref 1.5–2.5)
ALP SERPL-CCNC: 169 U/L (ref 35–104)
ALT SERPL W P-5'-P-CCNC: 60 U/L (ref 10–36)
ANION GAP SERPL CALCULATED.3IONS-SCNC: 3.8 MMOL/L (ref 3.6–11.2)
AST SERPL-CCNC: 38 U/L (ref 10–30)
BILIRUB SERPL-MCNC: 0.2 MG/DL (ref 0.2–1.8)
BUN BLD-MCNC: 11 MG/DL (ref 7–21)
BUN/CREAT SERPL: 17.5 (ref 7–25)
CALCIUM SPEC-SCNC: 9.7 MG/DL (ref 7.7–10)
CHLORIDE SERPL-SCNC: 109 MMOL/L (ref 99–112)
CO2 SERPL-SCNC: 26.2 MMOL/L (ref 24.3–31.9)
CREAT BLD-MCNC: 0.63 MG/DL (ref 0.43–1.29)
CRP SERPL-MCNC: 7.46 MG/DL (ref 0–0.99)
DEPRECATED RDW RBC AUTO: 48.1 FL (ref 37–54)
ERYTHROCYTE [DISTWIDTH] IN BLOOD BY AUTOMATED COUNT: 14.2 % (ref 11.5–14.5)
GFR SERPL CREATININE-BSD FRML MDRD: 98 ML/MIN/1.73
GLOBULIN UR ELPH-MCNC: 3.2 GM/DL
GLUCOSE BLD-MCNC: 152 MG/DL (ref 70–110)
HCT VFR BLD AUTO: 37.8 % (ref 37–47)
HGB BLD-MCNC: 11.9 G/DL (ref 12–16)
HYPOCHROMIA BLD QL: ABNORMAL
LYMPHOCYTES # BLD MANUAL: 1.52 10*3/MM3 (ref 1–3)
LYMPHOCYTES NFR BLD MANUAL: 1 % (ref 0–10)
LYMPHOCYTES NFR BLD MANUAL: 7 % (ref 21–51)
MACROCYTES BLD QL SMEAR: ABNORMAL
MCH RBC QN AUTO: 30.7 PG (ref 27–33)
MCHC RBC AUTO-ENTMCNC: 31.5 G/DL (ref 33–37)
MCV RBC AUTO: 97.4 FL (ref 80–94)
MONOCYTES # BLD AUTO: 0.22 10*3/MM3 (ref 0.1–0.9)
NEUTROPHILS # BLD AUTO: 19.98 10*3/MM3 (ref 1.4–6.5)
NEUTROPHILS NFR BLD MANUAL: 92 % (ref 30–70)
OSMOLALITY SERPL CALC.SUM OF ELEC: 279.9 MOSM/KG (ref 273–305)
PLAT MORPH BLD: NORMAL
PLATELET # BLD AUTO: 213 10*3/MM3 (ref 130–400)
PMV BLD AUTO: 12.5 FL (ref 6–10)
POTASSIUM BLD-SCNC: 4.9 MMOL/L (ref 3.5–5.3)
PROT SERPL-MCNC: 6.8 G/DL (ref 6–8)
RBC # BLD AUTO: 3.88 10*6/MM3 (ref 4.2–5.4)
SCAN SLIDE: NORMAL
SODIUM BLD-SCNC: 139 MMOL/L (ref 135–153)
WBC NRBC COR # BLD: 21.72 10*3/MM3 (ref 4.5–12.5)

## 2017-11-04 PROCEDURE — 85007 BL SMEAR W/DIFF WBC COUNT: CPT | Performed by: INTERNAL MEDICINE

## 2017-11-04 PROCEDURE — 25010000002 HEPARIN (PORCINE) PER 1000 UNITS: Performed by: PHYSICIAN ASSISTANT

## 2017-11-04 PROCEDURE — 99239 HOSP IP/OBS DSCHRG MGMT >30: CPT | Performed by: INTERNAL MEDICINE

## 2017-11-04 PROCEDURE — 80053 COMPREHEN METABOLIC PANEL: CPT | Performed by: INTERNAL MEDICINE

## 2017-11-04 PROCEDURE — 94799 UNLISTED PULMONARY SVC/PX: CPT

## 2017-11-04 PROCEDURE — 86140 C-REACTIVE PROTEIN: CPT | Performed by: PHYSICIAN ASSISTANT

## 2017-11-04 PROCEDURE — 93005 ELECTROCARDIOGRAM TRACING: CPT | Performed by: HOSPITALIST

## 2017-11-04 PROCEDURE — 25010000002 METHYLPREDNISOLONE PER 40 MG: Performed by: PHYSICIAN ASSISTANT

## 2017-11-04 PROCEDURE — 85025 COMPLETE CBC W/AUTO DIFF WBC: CPT | Performed by: INTERNAL MEDICINE

## 2017-11-04 RX ORDER — GUAIFENESIN 600 MG/1
600 TABLET, EXTENDED RELEASE ORAL EVERY 12 HOURS SCHEDULED
Qty: 14 TABLET | Refills: 0 | Status: SHIPPED | OUTPATIENT
Start: 2017-11-04 | End: 2017-11-11

## 2017-11-04 RX ORDER — DOXYCYCLINE HYCLATE 100 MG
100 TABLET ORAL 2 TIMES DAILY
Qty: 14 TABLET | Refills: 0 | Status: SHIPPED | OUTPATIENT
Start: 2017-11-04 | End: 2017-11-11

## 2017-11-04 RX ORDER — CEFPODOXIME PROXETIL 200 MG/1
200 TABLET, FILM COATED ORAL EVERY 12 HOURS
Qty: 14 TABLET | Refills: 0 | Status: SHIPPED | OUTPATIENT
Start: 2017-11-04 | End: 2017-11-11

## 2017-11-04 RX ORDER — PREDNISONE 10 MG/1
TABLET ORAL
Qty: 13 TABLET | Refills: 0 | Status: SHIPPED | OUTPATIENT
Start: 2017-11-04 | End: 2018-05-01

## 2017-11-04 RX ORDER — BENZONATATE 200 MG/1
200 CAPSULE ORAL 3 TIMES DAILY PRN
Qty: 30 CAPSULE | Refills: 0 | Status: SHIPPED | OUTPATIENT
Start: 2017-11-04 | End: 2018-05-01

## 2017-11-04 RX ORDER — GUAIFENESIN/DEXTROMETHORPHAN 100-10MG/5
5 SYRUP ORAL EVERY 4 HOURS PRN
Qty: 236 ML | Refills: 0 | Status: SHIPPED | OUTPATIENT
Start: 2017-11-04 | End: 2022-10-27

## 2017-11-04 RX ADMIN — HEPARIN SODIUM 5000 UNITS: 5000 INJECTION, SOLUTION INTRAVENOUS; SUBCUTANEOUS at 08:50

## 2017-11-04 RX ADMIN — IPRATROPIUM BROMIDE AND ALBUTEROL SULFATE 3 ML: .5; 3 SOLUTION RESPIRATORY (INHALATION) at 06:57

## 2017-11-04 RX ADMIN — CARVEDILOL 25 MG: 25 TABLET, FILM COATED ORAL at 08:50

## 2017-11-04 RX ADMIN — GUAIFENESIN AND DEXTROMETHORPHAN 5 ML: 100; 10 SYRUP ORAL at 05:09

## 2017-11-04 RX ADMIN — IPRATROPIUM BROMIDE AND ALBUTEROL SULFATE 3 ML: .5; 3 SOLUTION RESPIRATORY (INHALATION) at 01:20

## 2017-11-04 RX ADMIN — CETIRIZINE HYDROCHLORIDE 10 MG: 10 TABLET ORAL at 08:50

## 2017-11-04 RX ADMIN — Medication 1 CAPSULE: at 08:50

## 2017-11-04 RX ADMIN — GUAIFENESIN 600 MG: 600 TABLET, EXTENDED RELEASE ORAL at 08:50

## 2017-11-04 RX ADMIN — METHYLPREDNISOLONE SODIUM SUCCINATE 20 MG: 40 INJECTION, POWDER, FOR SOLUTION INTRAMUSCULAR; INTRAVENOUS at 05:09

## 2017-11-04 RX ADMIN — GUAIFENESIN AND DEXTROMETHORPHAN 5 ML: 100; 10 SYRUP ORAL at 00:39

## 2017-11-04 RX ADMIN — DOXYCYCLINE 100 MG: 100 INJECTION, POWDER, LYOPHILIZED, FOR SOLUTION INTRAVENOUS at 05:08

## 2017-11-04 RX ADMIN — BUDESONIDE AND FORMOTEROL FUMARATE DIHYDRATE 2 PUFF: 160; 4.5 AEROSOL RESPIRATORY (INHALATION) at 06:57

## 2017-11-04 NOTE — DISCHARGE SUMMARY
Date of Admission: 11/1/2017    Date of Discharge: 11/4/2017     PCP: BUDDY Cutler    Admission Diagnosis:  -Severe sepsis likely secondary to bibasilar pneumonia  -Mycoplasma positive Bibasilar pneumonia  -Acute hypoxic respiratory failure  -Acute exacerbation fo COPD  -CAD, stable        Discharge Diagnosis:   -Severe sepsis likely secondary to bibasilar pneumonia,resolved  -Bibasilar pneumonia, Mycoplasma +  -Acute hypoxic respiratory failure   -Acute exacerbation of COPD  -Diarrhea, resolved  -Hypokalemia  -Essential HTN  -CAD with hx of stent placement  -Prolonged QTc  -HLD         Consults:   Consults     Date and Time Order Name Status Description    11/1/2017 1426 Hospitalist (on-call MD unless specified)              History of Present Illness:  Grecia Galicia is a 57 y.o. female who presented to [facility] [place] ED with chief complaint of several days (7-10) of progressively worsening shortness of breath, wheezing, dyspnea with exertion, and cough productive of yellow, green sputum.  She was found to have hypoxic respiratory failure, sepsis with underlying bilateral pneumonia and acute exacerbation of COPD.  Please see admission H&P for complete details.          Hospital Course  Grecia Galicia was admitted to the telemetry floor for further evaluation and treatment. IV Rocephin and IV doxycycline were started for CAP treatment with QTc prolongation.   Mrs. Galicia was found to be Mycoplasma positive. Initial respiratory culture revealed moderate growth of Haemophilus influenzae. She was found to be hypoxic on room air on initial ABG as viewed in below laboratory results.  Chest x-ray revealed bibasilar pneumonia.  Admission WBC was elevated as viewed below with significantly elevated C-RP.   Mrs. Galicia responded very well to IV Rocephin and IV Doxycycline.  She was on 3 liters of oxygen through the majority of her stay.  On the morning of 11/2/17, Mrs. Galicia began feeling improved.  On  11/3/17, she felt significantly improved but did report diarrhea.  Stool studies including C.difficile toxin testing were ordered; however, diarrhea reportedly resolved before these were obtained.  On the day of 11/4/17, Mrs. Galicia reported that she felt better than she does at baseline.  She reports she felt significantly improved and ready for discharge.  Her respiratory status improved throughout her stay with improving breath sounds and dyspnea. Repeat chest x-ray demonstrated improvement of infiltrate and evidence of interstitial lung disease.  Please see below laboratory values as obtained throughout stay. Additionally, echocardiogram was obtained given dyspnea with exertion. EF was 56-60% in addition to trace tricuspid and mitral valve regurgitation.   Mrs. Galicia was discharged with oral Vantin, Doxycycline, and tapering prednisone dosage as further outlined in discharge medications within this record.  Prior to discharge, Mrs. Galicia ambulated with staff in the hallway on room air with a drop in her O2% to 78.   She tolerated 2 liters of oxygen via nasal cannula well.  This has been arranged for discharge.  She is to follow-up with her PCP on 11/7/17 as she was previously scheduled. She was counseled on the importance of tobacco cessation.       Pertinent Test Results:     Results from last 7 days  Lab Units 11/01/17  1314   TROPONIN I ng/mL <0.006       Results from last 7 days  Lab Units 11/04/17  0033 11/03/17  0129 11/02/17  0501 11/01/17  1314   CRP mg/dL 7.46* 14.93* 24.36* 25.22*   LACTATE mmol/L  --   --   --  1.3   WBC 10*3/mm3 21.72* 22.31* 13.10* 20.21*   HEMOGLOBIN g/dL 11.9* 11.3* 11.5* 12.9   HEMATOCRIT % 37.8 34.8* 34.7* 38.7   MCV fL 97.4* 93.8 94.3* 93.5   MCHC g/dL 31.5* 32.5* 33.1 33.3   PLATELETS 10*3/mm3 213 266 196 214       Results from last 7 days  Lab Units 11/01/17  1303   PH, ARTERIAL pH units 7.536*   PO2 ART mm Hg 45.5*   PCO2, ARTERIAL mm Hg 34.7*   HCO3 ART mmol/L  28.7*       Results from last 7 days  Lab Units 11/04/17  0033 11/03/17  0129 11/02/17  0501 11/01/17  1314   SODIUM mmol/L 139 139 136 137   POTASSIUM mmol/L 4.9 4.7 3.1* 3.5   MAGNESIUM mg/dL  --  2.2 2.1  --    CHLORIDE mmol/L 109 111 100 95*   CO2 mmol/L 26.2 24.2* 25.1 30.9   BUN mg/dL 11 17 14 15   CREATININE mg/dL 0.63 0.51 0.65 0.72   EGFR IF NONAFRICN AM mL/min/1.73 98 125 94 84   CALCIUM mg/dL 9.7 9.4 8.8 9.9   GLUCOSE mg/dL 152* 114* 237* 111*   ALBUMIN g/dL 3.60 3.70  --  4.20   BILIRUBIN mg/dL 0.2 0.2  --  0.6   ALK PHOS U/L 169* 173*  --  174*   AST (SGOT) U/L 38* 55*  --  30   ALT (SGPT) U/L 60* 54*  --  33   Estimated Creatinine Clearance: 81.5 mL/min (by C-G formula based on Cr of 0.63).  No results found for: AMMONIA    Results from last 7 days  Lab Units 11/03/17  0129   CHOLESTEROL mg/dL 80   TRIGLYCERIDES mg/dL 106   HDL CHOL mg/dL 13*     Blood Culture   Date Value Ref Range Status   11/01/2017 No growth at 2 days  Preliminary   11/01/2017 No growth at 2 days  Preliminary   11/01/2017 No growth at 2 days  Preliminary   11/01/2017 No growth at 2 days  Preliminary              Lab Results (all)     Procedure Component Value Units Date/Time    Blood Gas, Arterial [[id number]]  (Abnormal) Collected:  11/01/17 1303    Specimen:  Arterial Blood Updated:  11/01/17 1307     Site Arterial: left brachial     Mega's Test N/A     pH, Arterial 7.536 (C) pH units      pCO2, Arterial 34.7 (L) mm Hg      pO2, Arterial 45.5 (C) mm Hg      HCO3, Arterial 28.7 (H) mmol/L      Base Excess, Arterial 6.2 mmol/L      O2 Saturation, Arterial 85.0 (L) %      Hemoglobin, Blood Gas 13.1 g/dL      Hematocrit, Blood Gas 39.0 %      Oxyhemoglobin 83.6 (L) %      Methemoglobin 0.20 %      Carboxyhemoglobin 1.4 %      A-a Gradiant 55.7 mmHg      Temperature 98.6 C      Barometric Pressure for Blood Gas 727 mmHg      Modality Room Air     FIO2 21 %     CBC & Differential [[id number]] Collected:  11/01/17 1314    Specimen:  Blood  Updated:  11/01/17 1329    Narrative:       The following orders were created for panel order CBC & Differential.  Procedure                               Abnormality         Status                     ---------                               -----------         ------                     CBC Auto Differential[[id number]]        Abnormal            Final result                 Please view results for these tests on the individual orders.    CBC Auto Differential [[id number]]  (Abnormal) Collected:  11/01/17 1314    Specimen:  Blood from Arm, Right Updated:  11/01/17 1329     WBC 20.21 (H) 10*3/mm3      RBC 4.14 (L) 10*6/mm3      Hemoglobin 12.9 g/dL      Hematocrit 38.7 %      MCV 93.5 fL      MCH 31.2 pg      MCHC 33.3 g/dL      RDW 13.1 %      RDW-SD 43.1 fl      MPV 11.8 (H) fL      Platelets 214 10*3/mm3      Neutrophil % 86.7 (H) %      Lymphocyte % 7.5 (L) %      Monocyte % 5.2 %      Eosinophil % 0.1 %      Basophil % 0.1 %      Immature Grans % 0.4 %      Neutrophils, Absolute 17.50 (H) 10*3/mm3      Lymphocytes, Absolute 1.51 10*3/mm3      Monocytes, Absolute 1.06 (H) 10*3/mm3      Eosinophils, Absolute 0.02 10*3/mm3      Basophils, Absolute 0.03 10*3/mm3      Immature Grans, Absolute 0.09 (H) 10*3/mm3     Lactic Acid, Plasma [[id number]]  (Normal) Collected:  11/01/17 1314    Specimen:  Blood from Arm, Right Updated:  11/01/17 1343     Lactate 1.3 mmol/L     Influenza Antigen, Rapid - Swab, Nasopharynx [[id number]]  (Normal) Collected:  11/01/17 1317    Specimen:  Swab from Nasopharynx Updated:  11/01/17 1346     Influenza A Ag, EIA Negative     Influenza B Ag, EIA Negative    Comprehensive Metabolic Panel [[id number]]  (Abnormal) Collected:  11/01/17 1314    Specimen:  Blood from Arm, Right Updated:  11/01/17 1350     Glucose 111 (H) mg/dL      BUN 15 mg/dL      Creatinine 0.72 mg/dL      Sodium 137 mmol/L      Potassium 3.5 mmol/L      Chloride 95 (L) mmol/L      CO2 30.9 mmol/L      Calcium 9.9 mg/dL       Total Protein 8.0 g/dL      Albumin 4.20 g/dL      ALT (SGPT) 33 U/L      AST (SGOT) 30 U/L      Alkaline Phosphatase 174 (H) U/L       Note New Reference Ranges        Total Bilirubin 0.6 mg/dL      eGFR Non African Amer 84 mL/min/1.73      Globulin 3.8 gm/dL      A/G Ratio 1.1 (L) g/dL      BUN/Creatinine Ratio 20.8     Anion Gap 11.1 mmol/L     Osmolality, Calculated [[id number]]  (Normal) Collected:  11/01/17 1314    Specimen:  Blood from Arm, Right Updated:  11/01/17 1350     Osmolality Calc 275.3 mOsm/kg     BNP [[id number]]  (Normal) Collected:  11/01/17 1314    Specimen:  Blood from Arm, Right Updated:  11/01/17 1354     BNP 22.0 pg/mL     C-reactive Protein [[id number]]  (Abnormal) Collected:  11/01/17 1314    Specimen:  Blood from Arm, Right Updated:  11/01/17 1355     C-Reactive Protein 25.22 (H) mg/dL     Troponin [[id number]]  (Normal) Collected:  11/01/17 1314    Specimen:  Blood from Arm, Right Updated:  11/01/17 1358     Troponin I <0.006 ng/mL     Narrative:       Ultra Troponin I Reference Range:         <=0.039 ng/mL: Negative    0.04-0.779 ng/mL: Indeterminate Range. Suspicious of MI.  Clinical correlation required.       >=0.78  ng/mL: Consistent with myocardial injury.  Clinical correlation required.    Light Blue Top [[id number]] Collected:  11/01/17 1314    Specimen:  Blood from Arm, Right Updated:  11/01/17 1416     Extra Tube hold for add-on      Auto resulted       Lavender Top [[id number]] Collected:  11/01/17 1314    Specimen:  Blood from Arm, Right Updated:  11/01/17 1416     Extra Tube hold for add-on      Auto resulted       Mead Draw [[id number]] Collected:  11/01/17 1314    Specimen:  Blood Updated:  11/01/17 1416    Narrative:       The following orders were created for panel order Mead Draw.  Procedure                               Abnormality         Status                     ---------                               -----------         ------                     Light Blue  Top[[id number]]                                   Final result               Green Top (Gel)[[id number]]                                  Final result               Lavender Top[[id number]]                                     Final result               Gold Top - SST[[id number]]                                   Final result                 Please view results for these tests on the individual orders.    Green Top (Gel) [[id number]] Collected:  11/01/17 1314    Specimen:  Blood from Arm, Right Updated:  11/01/17 1416     Extra Tube Hold for add-ons.      Auto resulted.       Gold Top - SST [[id number]] Collected:  11/01/17 1314    Specimen:  Blood from Arm, Right Updated:  11/01/17 1416     Extra Tube Hold for add-ons.      Auto resulted.       Hemoglobin A1c [[id number]]  (Abnormal) Collected:  11/01/17 1547    Specimen:  Blood Updated:  11/01/17 1627     Hemoglobin A1C 5.90 (H) %     TSH [[id number]]  (Normal) Collected:  11/01/17 1547    Specimen:  Blood Updated:  11/01/17 1642     TSH 0.872 mIU/mL     Mycoplasma Pneumoniae Antibody, IgM [[id number]]  (Abnormal) Collected:  11/01/17 1547    Specimen:  Blood Updated:  11/01/17 1648     Mycoplasma pneumo IgM Positive (C)    CBC & Differential [[id number]] Collected:  11/02/17 0501    Specimen:  Blood Updated:  11/02/17 0540    Narrative:       The following orders were created for panel order CBC & Differential.  Procedure                               Abnormality         Status                     ---------                               -----------         ------                     CBC Auto Differential[[id number]]        Abnormal            Final result                 Please view results for these tests on the individual orders.    CBC Auto Differential [[id number]]  (Abnormal) Collected:  11/02/17 0501    Specimen:  Blood Updated:  11/02/17 0540     WBC 13.10 (H) 10*3/mm3      RBC 3.68 (L) 10*6/mm3      Hemoglobin 11.5 (L) g/dL      Hematocrit 34.7 (L) %      MCV 94.3 (H)  fL      MCH 31.3 pg      MCHC 33.1 g/dL      RDW 13.2 %      RDW-SD 43.5 fl      MPV 12.1 (H) fL      Platelets 196 10*3/mm3      Neutrophil % 92.0 (H) %      Lymphocyte % 6.4 (L) %      Monocyte % 1.0 %      Eosinophil % 0.0 %      Basophil % 0.2 %      Immature Grans % 0.4 %      Neutrophils, Absolute 12.06 (H) 10*3/mm3      Lymphocytes, Absolute 0.84 (L) 10*3/mm3      Monocytes, Absolute 0.13 10*3/mm3      Eosinophils, Absolute 0.00 10*3/mm3      Basophils, Absolute 0.02 10*3/mm3      Immature Grans, Absolute 0.05 (H) 10*3/mm3     Urinalysis With / Culture If Indicated - Urine, Clean Catch [[id number]]  (Normal) Collected:  11/02/17 0541    Specimen:  Urine from Urine, Clean Catch Updated:  11/02/17 0552     Color, UA Yellow     Appearance, UA Clear     pH, UA 5.5     Specific Gravity, UA 1.008     Glucose, UA Negative     Ketones, UA Negative     Bilirubin, UA Negative     Blood, UA Negative     Protein, UA Negative     Leuk Esterase, UA Negative     Nitrite, UA Negative     Urobilinogen, UA 1.0 E.U./dL    Narrative:       Urine microscopic not indicated.    Basic Metabolic Panel [[id number]]  (Abnormal) Collected:  11/02/17 0501    Specimen:  Blood Updated:  11/02/17 0614     Glucose 237 (H) mg/dL      BUN 14 mg/dL      Creatinine 0.65 mg/dL      Sodium 136 mmol/L      Potassium 3.1 (L) mmol/L      Chloride 100 mmol/L      CO2 25.1 mmol/L      Calcium 8.8 mg/dL      eGFR Non African Amer 94 mL/min/1.73      BUN/Creatinine Ratio 21.5     Anion Gap 10.9 mmol/L     Narrative:       GFR Normal >60  Chronic Kidney Disease <60  Kidney Failure <15    Osmolality, Calculated [[id number]]  (Normal) Collected:  11/02/17 0501    Specimen:  Blood Updated:  11/02/17 0614     Osmolality Calc 280.1 mOsm/kg     Legionella Antigen, Urine - Urine, Clean Catch [[id number]]  (Normal) Collected:  11/02/17 0541    Specimen:  Urine from Urine, Clean Catch Updated:  11/02/17 0615     LEGIONELLA ANTIGEN, URINE Negative    Narrative:          Presumptive negative for L. pneumophilia serogroup 1 antigen, suggesting no recent or current infection.    C-reactive Protein [[id number]]  (Abnormal) Collected:  11/02/17 0501    Specimen:  Blood Updated:  11/02/17 0615     C-Reactive Protein 24.36 (H) mg/dL     Magnesium [[id number]]  (Normal) Collected:  11/02/17 0501    Specimen:  Blood Updated:  11/02/17 0946     Magnesium 2.1 mg/dL     Magnesium [[id number]]  (Normal) Collected:  11/03/17 0129    Specimen:  Blood Updated:  11/03/17 0245     Magnesium 2.2 mg/dL     Lipid Panel [[id number]]  (Abnormal) Collected:  11/03/17 0129    Specimen:  Blood Updated:  11/03/17 0245     Total Cholesterol 80 mg/dL      Triglycerides 106 mg/dL      HDL Cholesterol 13 (L) mg/dL      LDL Cholesterol  46 mg/dL      VLDL Cholesterol 21.2 mg/dL      LDL/HDL Ratio 3.52    Narrative:       Cholesterol Reference Ranges  (U.S. Department of Health and Human Services ATP III Classifications)    Desirable          <200 mg/dL  Borderline High    200-239 mg/dL  High Risk          >240 mg/dL      Triglyceride Reference Ranges  (U.S. Department of Health and Human Services ATP III Classifications)    Normal           <150 mg/dL  Borderline High  150-199 mg/dL  High             200-499 mg/dL  Very High        >500 mg/dL    HDL Reference Ranges  (U.S. Department of Health and Human Services ATP III Classifcations)    Low     <40 mg/dl (major risk factor for CHD)  High    >60 mg/dl ('negative' risk factor for CHD)        LDL Reference Ranges  (U.S. Department of Health and Human Services ATP III Classifcations)    Optimal          <100 mg/dL  Near Optimal     100-129 mg/dL  Borderline High  130-159 mg/dL  High             160-189 mg/dL  Very High        >189 mg/dL    Comprehensive Metabolic Panel [[id number]]  (Abnormal) Collected:  11/03/17 0129    Specimen:  Blood Updated:  11/03/17 0247     Glucose 114 (H) mg/dL      BUN 17 mg/dL      Creatinine 0.51 mg/dL      Sodium 139 mmol/L       Potassium 4.7 mmol/L      Chloride 111 mmol/L      CO2 24.2 (L) mmol/L      Calcium 9.4 mg/dL      Total Protein 6.7 g/dL      Albumin 3.70 g/dL      ALT (SGPT) 54 (H) U/L      AST (SGOT) 55 (H) U/L      Alkaline Phosphatase 173 (H) U/L       Note New Reference Ranges        Total Bilirubin 0.2 mg/dL      eGFR Non African Amer 125 mL/min/1.73      Globulin 3.0 gm/dL      A/G Ratio 1.2 (L) g/dL      BUN/Creatinine Ratio 33.3 (H)     Anion Gap 3.8 mmol/L     C-reactive Protein [[id number]]  (Abnormal) Collected:  11/03/17 0129    Specimen:  Blood Updated:  11/03/17 0247     C-Reactive Protein 14.93 (H) mg/dL     Osmolality, Calculated [[id number]]  (Normal) Collected:  11/03/17 0129    Specimen:  Blood Updated:  11/03/17 0247     Osmolality Calc 279.9 mOsm/kg     CBC & Differential [[id number]] Collected:  11/03/17 0129    Specimen:  Blood Updated:  11/03/17 0300    Narrative:       The following orders were created for panel order CBC & Differential.  Procedure                               Abnormality         Status                     ---------                               -----------         ------                     Manual Differential[[id number]]          Abnormal            Final result               Scan Slide[[id number]]                                       Final result               CBC Auto Differential[[id number]]        Abnormal            Final result                 Please view results for these tests on the individual orders.    CBC Auto Differential [[id number]]  (Abnormal) Collected:  11/03/17 0129    Specimen:  Blood Updated:  11/03/17 0300     WBC 22.31 (H) 10*3/mm3      RBC 3.71 (L) 10*6/mm3      Hemoglobin 11.3 (L) g/dL      Hematocrit 34.8 (L) %      MCV 93.8 fL      MCH 30.5 pg      MCHC 32.5 (L) g/dL      RDW 13.6 %      RDW-SD 46.4 fl      MPV 12.5 (H) fL      Platelets 266 10*3/mm3     Scan Slide [[id number]] Collected:  11/03/17 0129    Specimen:  Blood Updated:  11/03/17 0300     Scan Slide --       See Manual Differential Results       Manual Differential [[id number]]  (Abnormal) Collected:  11/03/17 0129    Specimen:  Blood Updated:  11/03/17 0300     Neutrophil % 95.0 (H) %      Lymphocyte % 4.0 (L) %      Monocyte % 1.0 %      Neutrophils Absolute 21.19 (H) 10*3/mm3      Lymphocytes Absolute 0.89 (L) 10*3/mm3      Monocytes Absolute 0.22 10*3/mm3      RBC Morphology Normal     Platelet Morphology Normal    Respiratory Culture - Sputum, Cough [[id number]] Collected:  11/01/17 1642    Specimen:  Sputum from Cough Updated:  11/03/17 0934     Respiratory Culture Moderate growth (3+) Normal Respiratory [place]     Gram Stain Result Few (2+) Gram positive cocci in pairs      Rare (1+) Gram negative bacilli      Moderate (3+) WBCs seen    Respiratory Culture - Sputum, Cough [[id number]] Collected:  11/01/17 1401    Specimen:  Sputum from Cough Updated:  11/03/17 1002     Respiratory Culture Culture in progress     Gram Stain Result Moderate (3+) Gram negative bacilli      Few (2+) WBCs seen      Rare (1+) Gram positive cocci in pairs    Blood Culture - Blood, [[id number]]  (Normal) Collected:  11/01/17 1345    Specimen:  Blood from Arm, Right Updated:  11/03/17 1501     Blood Culture No growth at 2 days    Blood Culture - Blood, [[id number]]  (Normal) Collected:  11/01/17 1314    Specimen:  Blood from Arm, Right Updated:  11/03/17 1501     Blood Culture No growth at 2 days    Blood Culture - Blood, [[id number]]  (Normal) Collected:  11/01/17 1553    Specimen:  Blood from Hand, Right Updated:  11/03/17 1616     Blood Culture No growth at 2 days    Blood Culture - Blood, [[id number]]  (Normal) Collected:  11/01/17 1547    Specimen:  Blood from Arm, Right Updated:  11/03/17 1616     Blood Culture No growth at 2 days    Comprehensive Metabolic Panel [[id number]]  (Abnormal) Collected:  11/04/17 0033    Specimen:  Blood Updated:  11/04/17 0223     Glucose 152 (H) mg/dL      BUN 11 mg/dL      Creatinine 0.63 mg/dL       Sodium 139 mmol/L      Potassium 4.9 mmol/L      Chloride 109 mmol/L      CO2 26.2 mmol/L      Calcium 9.7 mg/dL      Total Protein 6.8 g/dL      Albumin 3.60 g/dL      ALT (SGPT) 60 (H) U/L      AST (SGOT) 38 (H) U/L      Alkaline Phosphatase 169 (H) U/L       Note New Reference Ranges        Total Bilirubin 0.2 mg/dL      eGFR Non African Amer 98 mL/min/1.73      Globulin 3.2 gm/dL      A/G Ratio 1.1 (L) g/dL      BUN/Creatinine Ratio 17.5     Anion Gap 3.8 mmol/L     Osmolality, Calculated [[id number]]  (Normal) Collected:  11/04/17 0033    Specimen:  Blood Updated:  11/04/17 0223     Osmolality Calc 279.9 mOsm/kg     C-reactive Protein [[id number]]  (Abnormal) Collected:  11/04/17 0033    Specimen:  Blood Updated:  11/04/17 0227     C-Reactive Protein 7.46 (H) mg/dL     CBC & Differential [[id number]] Collected:  11/04/17 0033    Specimen:  Blood Updated:  11/04/17 0253    Narrative:       The following orders were created for panel order CBC & Differential.  Procedure                               Abnormality         Status                     ---------                               -----------         ------                     Manual Differential[[id number]]          Abnormal            Final result               Scan Slide[[id number]]                                       Final result               CBC Auto Differential[[id number]]        Abnormal            Final result                 Please view results for these tests on the individual orders.    CBC Auto Differential [[id number]]  (Abnormal) Collected:  11/04/17 0033    Specimen:  Blood Updated:  11/04/17 0253     WBC 21.72 (H) 10*3/mm3      RBC 3.88 (L) 10*6/mm3      Hemoglobin 11.9 (L) g/dL      Hematocrit 37.8 %      MCV 97.4 (H) fL      MCH 30.7 pg      MCHC 31.5 (L) g/dL      RDW 14.2 %      RDW-SD 48.1 fl      MPV 12.5 (H) fL      Platelets 213 10*3/mm3     Scan Slide [[id number]] Collected:  11/04/17 0033    Specimen:  Blood Updated:  11/04/17 0253      Scan Slide --      See Manual Differential Results       Manual Differential [[id number]]  (Abnormal) Collected:  11/04/17 0033    Specimen:  Blood Updated:  11/04/17 0253     Neutrophil % 92.0 (H) %      Lymphocyte % 7.0 (L) %      Monocyte % 1.0 %      Neutrophils Absolute 19.98 (H) 10*3/mm3      Lymphocytes Absolute 1.52 10*3/mm3      Monocytes Absolute 0.22 10*3/mm3      Hypochromia Slight/1+     Macrocytes Slight/1+     Platelet Morphology Normal                  Imaging Results (last 7 days)     Procedure Component Value Units Date/Time    XR Chest 2 View [[id number]] Collected:  11/01/17 1347     Updated:  11/01/17 1423    Narrative:       EXAMINATION: XR CHEST 2 VW-      CLINICAL INDICATION: Simple sepsis protocol          COMPARISON: 10/1/2014      TECHNIQUE: XR CHEST 2 VW-      FINDINGS:   Diffuse interstitial markings and minimal bibasilar consolidation   Heart and mediastinal contours are unremarkable.   No pneumothorax.   Bony and soft tissue structures are unremarkable.            Impression:       Bibasilar consolidation     This report was finalized on 11/1/2017 1:47 PM by Dr. Edgar Soler MD.       XR Chest AP [[id number]] Collected:  11/03/17 0758     Updated:  11/03/17 0859    Narrative:       XR CHEST AP-     HISTORY:  Pneumonia; J18.9-Pneumonia, unspecified organism; J96.01-Acute  respiratory failure with hypoxia          COMPARISON: 11/01/2017      TECHNIQUE: Single frontal view of the chest.     FINDINGS:     Diffuse increased interstitial lung disease.  The cardiac silhouette is normal. The pulmonary vasculature is  unremarkable.  There is no evidence of an acute osseous abnormality.   There are no suspicious-appearing parenchymal soft tissue nodules.            Impression:       Interstitial lung disease.         This report was finalized on 11/3/2017 8:57 AM by Dr. Edgar Soler MD.              ----------------------------------------------------------------------------------------------------------------------  Imaging Results (last 24 hours)     ** No results found for the last 24 hours. **        Condition on Discharge:  Fair    Vital Signs  Vitals:    11/04/17 0929   BP:    Pulse:    Resp:    Temp:    SpO2: 95%       Physical Exam:  General:    Awake, alert, in no acute distress, chronically ill appearing   Heart:      Normal S1 and S2. Regular rate and rhythm. No significant murmur, rubs or gallops appreciated.   Lungs:     Respirations regular, even and unlabored. Lungs clear to auscultation B/L. No wheezes, rales or rhonchi.   Abdomen:   Soft and nontender. No guarding, rebound tenderness or  organomegaly noted. Bowel sounds present x 4.   Extremities:  No clubbing, cyanosis or edema noted. Moves UE and LE equally B/L.     Discharge Disposition:   home      Discharge Medications:   Grecia Galicia   Home Medication Instructions SAM:[id number]    Printed on:11/04/17 7299   Medication Information                      albuterol (PROVENTIL HFA;VENTOLIN HFA) 108 (90 BASE) MCG/ACT inhaler  Inhale 2 puffs every 4 (four) hours as needed for wheezing.             aspirin 81 MG tablet  Take 81 mg by mouth Every Night.             atorvastatin (LIPITOR) 40 MG tablet  Take 40 mg by mouth Every Night.             benzonatate (TESSALON) 200 MG capsule  Take 1 capsule by mouth 3 (Three) Times a Day As Needed for Cough.             budesonide-formoterol (SYMBICORT) 160-4.5 MCG/ACT inhaler  Inhale 2 puffs 2 (Two) Times a Day.             carvedilol (COREG) 25 MG tablet  Take 1 tablet by mouth 2 (Two) Times a Day With Meals.             cefpodoxime (VANTIN) 200 MG tablet  Take 1 tablet by mouth Every 12 (Twelve) Hours for 7 days.             clopidogrel (PLAVIX) 75 MG tablet  Take 1 tablet by mouth Daily.             doxycycline (VIBRAMYICN) 100 MG tablet  Take 1 tablet by mouth 2 (Two) Times a Day for  7 days.             guaiFENesin (MUCINEX) 600 MG 12 hr tablet  Take 1 tablet by mouth Every 12 (Twelve) Hours for 7 days.             guaifenesin-dextromethorphan (ROBITUSSIN DM) 100-10 MG/5ML syrup  Take 5 mL by mouth Every 4 (Four) Hours As Needed for Cough.             loratadine (CLARITIN) 10 MG tablet  Take 10 mg by mouth Every Night.             nitroglycerin (NITROSTAT) 0.4 MG SL tablet  Place 1 tablet under the tongue Every 5 (Five) Minutes As Needed for Chest Pain for up to 25 doses. 1tab q 5mins X3.             predniSONE (DELTASONE) 10 MG tablet  Take 3 tablets daily for 2 days then 2 tabs daily for 2 days, then 1 tab daily for 2 days, then 1/2 tab daily for 2 days, then stop                   Discharge Diet:   cardiac diet       Dietary Orders            Start     Ordered    11/01/17 1536  Diet Regular; Cardiac  Diet Effective Now     Question Answer Comment   Diet Texture / Consistency Regular    Common Modifiers Cardiac        11/01/17 1535          Activity at Discharge:  activity as tolerated    Follow-up Appointments:  Additional Instructions for the Follow-ups that You Need to Schedule     Discharge Follow-up with PCP    As directed    Follow Up Details:  Follow up with Deonna GOODWIN as previously scheduled on 11/7/17.             Follow-up Information     Follow up with BUDDY Cutler .    Specialty:  Family Medicine    Why:  Follow up with Deonna GOODWIN as previously scheduled on 11/7/17.    Contact information:    [address] [place] [id number]  [phone]          Follow up with No Known Provider .    Why:  Follow up with Deonna GOODWIN as previously scheduled on 11/7/17.    Contact information:    [facility] [address]          Your Scheduled Appointments     Mar 08, 2018  1:40 PM EST   Follow Up with Pepe Olivera MD   [facility] (--)    [address] [facility] [address] [phone]           Arrive 15  minutes prior to appointment.            Additional instructions:      Deonna Lucio  offfice  Closed   Will call Monday  And  acall  Pt  With  An  apt                      Stephanie Osman PA-C  11/04/17  10:47 AM    I have reviewed the notes, assessments, and/or procedures performed by Stephanie Osman PA-C. I concur with her/his documentation of Grecia Galicia.    Michael Berry D.O.       Time: Greater than 30 minutes spent on this discharge.

## 2017-11-04 NOTE — PROGRESS NOTES
Pharmacy was consulted for smoking cessation.  Nicotine patches were recommended by Dr. Berry and patient will look into getting them over the counter.  I spoke to her and mentioned chantix or zyban and she is going to speak to her primary at her next outpatient appointment about chantix due to she tried and failed the patches before and chantix is covered with around a $3 copay.    Thank You;  Karis Lloyd RPH  11/04/17  12:08 PM

## 2017-11-04 NOTE — DISCHARGE PLACEMENT REQUEST
"Juve Carranza (57 y.o. Female)     Date of Birth Social Security Number Address Home Phone MRN    1960  [address] [id number] [phone] [id number]    Sikh Marital Status          None        Admission Date Admission Type Admitting Provider Attending Provider Department, Room/Bed    11/1/17 Emergency Michael Berry DO Troxell, Christopher A, DO [address], 3324/1P    Discharge Date Discharge Disposition Discharge Destination         Home or Self Care             Attending Provider: Michael Berry DO     Allergies:  No Known Drug Allergy    Isolation:  Droplet, Contact   Infection:  Mycoplasma pneumonia (11/01/17)   Code Status:  FULL    Ht:  48\" (121.9 cm)   Wt:  114 lb 4 oz (51.8 kg)    Admission Cmt:  None   Principal Problem:  None                Active Insurance as of 11/1/2017     Primary Coverage     Payor Plan Insurance Group Employer/Plan Group    MEDICARE MEDICARE A & B      Payor Plan Address Payor Plan Phone Number Effective From Effective To    [address] [phone] 12/1/2016     [place] [address]       Subscriber Name Subscriber Birth Date Member ID       JUVE CARRANZA 1960 [id number]           Secondary Coverage     Payor Plan Insurance Group Employer/Plan Group    [place]      Payor Plan Address Payor Plan Phone Number Effective From Effective To    [address] [address]  1/1/2016     [place] [address]       Subscriber Name Subscriber Birth Date Member ID       JUVE CARRANZA 1960 [id number]                 Emergency Contacts      (Rel.) Home Phone Work Phone Mobile Phone    Lorna Abdi [phone] -- --            Insurance Information                MEDICARE/MEDICARE A & B Phone: [phone]    Subscriber: Juve Carranza Subscriber#: [id number]    Group#:  Precert#:         [employer] " Phone:     Subscriber: Grecia Galicia Subscriber#: [id number]    Group#:  Precert#:              History & Physical      Srinathnelida Berry  at 2017  2:53 PM          Patient Identification:  Name:  Grecia Galicia  Age:  57 y.o.  Sex:  female  :  1960  MRN:  [id number]   Visit Number:  [id number]  Primary Care Physician:  BUDDY Cutler    I have seen the patient in conjunction with Stephanie Osman PA-C and I agree with the following statements. I have made any necessary changes below to reflect my findings.      Chief complaint: Dyspnea    History of presenting illness:   is a 58 yo female with known history of CAD, PAD s/p fem-fem bypass surgery, hypertension, and COPD.  She presented to the ED on this date with 10 days of progressively worsening dyspnea with cough, wheeze, chills, and subjective fever.  She initially presented to [facility] and she was referred to the ED for further evaluation and treatment. She was initially hypoxic on room air on her initial blood gas. She does not have nor utilize home oxygen.  She reports a cough productive of yellow-gray mucus. She denies hemoptysis, but states she may have had a tiny, bloody speck in her sputum yesterday.  She denies chest pain.  She denies abdominal pain, nausea, and vomiting.  She denies dysuria and hematuria.  She denies outpatient treatment. Her flu swab was negative in the ED.  She reports she is normally independent of her daily activities at home.  She dresses herself and cooks. Over the past 10 days, she has had difficulty ambulating since having shortness of breath and cough.   Actively coughing up yellow sputum during evaluation.     Mrs. Galicia reports her last hospitalization to be when she underwent fem-fem bypass by Dr. Lee in late .  It appears a stress test and echocardiogram were ordered by Dr. Olivera in 2017; however, it does not appear that Mrs. Galicia underwent testing.      I was accompanied by ANGEL Shukla during my encounter. Pt provides me with similar details as outlined above. States she has not smoked in 2 weeks since she has been feeling ill. Denies CP. Reports some choking episodes while coughing up excessive sputum but denies any other episodes of choking. Currently requesting something for her cough.     ---------------------------------------------------------------------------------------------------------------------   Review of Systems   Constitutional: Positive for chills, fatigue and fever. Negative for activity change.   HENT: Negative for congestion and hearing loss.    Eyes: Negative for pain and discharge.   Respiratory: Positive for cough, shortness of breath and wheezing. Negative for chest tightness.    Cardiovascular: Negative for chest pain, palpitations and leg swelling.   Gastrointestinal: Negative for abdominal distention and constipation.   Endocrine: Negative for cold intolerance and heat intolerance.   Genitourinary: Negative for difficulty urinating, dysuria and flank pain.   Musculoskeletal: Negative for arthralgias and gait problem.   Skin: Negative for color change and rash.   Neurological: Negative for dizziness, syncope and headaches.   Psychiatric/Behavioral: Negative for agitation and confusion.      ---------------------------------------------------------------------------------------------------------------------   Past Medical History:   Diagnosis Date   • Atherosclerotic cardiovascular disease    • Dyslipidemia    • Hypertension    • Myocardial infarction     WITH STENTING IN 2007.    • PAD (peripheral artery disease)      Past Surgical History:   Procedure Laterality Date   • CORONARY ANGIOPLASTY  2007    ALSO STENTING x1, RCA    • FEMORAL FEMORAL BYPASS Right    • ILIAC ARTERY STENT       Family History   Problem Relation Age of Onset   • Heart attack Mother    • Heart disease Brother      Social History     Social History   • Marital  status:      Spouse name: N/A   • Number of children: N/A   • Years of education: N/A     Social History Main Topics   • Smoking status: Current Every Day Smoker     Packs/day: 1.00     Years: 40.00     Types: Cigarettes   • Smokeless tobacco: Never Used   • Alcohol use No   • Drug use: No   • Sexual activity: Defer     Other Topics Concern   • None     Social History Narrative   • None     ---------------------------------------------------------------------------------------------------------------------   Allergies:  No known drug allergy  ---------------------------------------------------------------------------------------------------------------------   Prior to Admission Medications     Prescriptions Last Dose Informant Patient Reported? Taking?    albuterol (PROVENTIL HFA;VENTOLIN HFA) 108 (90 BASE) MCG/ACT inhaler   Yes No    Inhale 2 puffs every 4 (four) hours as needed for wheezing.    aspirin 81 MG tablet   Yes No    Take  by mouth.    atorvastatin (LIPITOR) 40 MG tablet   Yes No    Take  by mouth.    budesonide-formoterol (SYMBICORT) 160-4.5 MCG/ACT inhaler   Yes No    Symbicort AERO; Patient Sig: Symbicort AERO ; 0; 04-Dec-2014; Active    carvedilol (COREG) 25 MG tablet   No No    Take 1 tablet by mouth 2 (Two) Times a Day With Meals.    clopidogrel (PLAVIX) 75 MG tablet   No No    Take 1 tablet by mouth Daily.    fluticasone (FLONASE) 50 MCG/ACT nasal spray   Yes No    2 sprays into each nostril daily. Administer 2 sprays in each nostril for each dose.    Loratadine 10 MG capsule   Yes No    Take 10 mg by mouth Daily.    nitroglycerin (NITROSTAT) 0.4 MG SL tablet   No No    Place 1 tablet under the tongue Every 5 (Five) Minutes As Needed for Chest Pain for up to 25 doses. 1tab q 5mins X3.        Hospital Scheduled Meds:    [START ON 11/2/2017] ceftriaxone 1 g Intravenous Q24H   doxycycline 100 mg Intravenous Q12H   guaiFENesin 600 mg Oral Q12H   heparin (porcine) 5,000 Units Subcutaneous Q12H    ipratropium-albuterol 3 mL Nebulization 4x Daily - RT   methylPREDNISolone sodium succinate 40 mg Intravenous Q12H       sodium chloride 75 mL/hr     ---------------------------------------------------------------------------------------------------------------------   Vital Signs:  Temp:  [98.3 °F (36.8 °C)-98.8 °F (37.1 °C)] 98.8 °F (37.1 °C)  Heart Rate:  [101-110] 105  Resp:  [18-24] 18  BP: (103-128)/(68-81) 115/74  Last 3 weights    11/01/17  1246   Weight: 90 lb (40.8 kg)     Body mass index is 27.46 kg/(m^2).  ---------------------------------------------------------------------------------------------------------------------   Physical Exam:  Constitutional:  Chronically ill appearing and appears older than stated age.    HENT:  Head: Normocephalic and atraumatic.  Mouth:  Moist mucous membranes.    Eyes:  Conjunctivae and EOM are normal.  Pupils are equal, round, and reactive to light.  No scleral icterus.  Neck:  Neck supple.  No JVD present.    Cardiovascular:  Normal rate, regular rhythm.  S1/S2 appreciated with no murmur.  Pulmonary/Chest:  Respirations regular, even and unlabored. Diminished breath sounds throughout. Upper expiratory wheezing appreciated with auscultation.   Mild left basilar rhonchi appreciated posteriorly.    Abdominal:  Soft.  Bowel sounds are present.  No distension and no tenderness.   Musculoskeletal:  No edema and no tenderness.    Neurological:  Alert and oriented to person, place, and time.  No facial droop.  .   Skin:  Skin is warm and dry.  No rash noted.  No pallor.   Peripheral vascular:  No edema and strong pulses in all 4 extremities.  ---------------------------------------------------------------------------------------------------------------------  EKG:            Telemetry:  Sinus tachycardia in the 100s during bedside evaluation  ---------------------------------------------------------------------------------------------------------------------     Results from  last 7 days  Lab Units 11/01/17  1314   CRP mg/dL 25.22*   LACTATE mmol/L 1.3   WBC 10*3/mm3 20.21*   HEMOGLOBIN g/dL 12.9   HEMATOCRIT % 38.7   MCV fL 93.5   MCHC g/dL 33.3   PLATELETS 10*3/mm3 214       Results from last 7 days  Lab Units 11/01/17  1303   PH, ARTERIAL pH units 7.536*   PO2 ART mm Hg 45.5*   PCO2, ARTERIAL mm Hg 34.7*   HCO3 ART mmol/L 28.7*       Results from last 7 days  Lab Units 11/01/17  1314   SODIUM mmol/L 137   POTASSIUM mmol/L 3.5   CHLORIDE mmol/L 95*   CO2 mmol/L 30.9   BUN mg/dL 15   CREATININE mg/dL 0.72   EGFR IF NONAFRICN AM mL/min/1.73 84   CALCIUM mg/dL 9.9   GLUCOSE mg/dL 111*   ALBUMIN g/dL 4.20   BILIRUBIN mg/dL 0.6   ALK PHOS U/L 174*   AST (SGOT) U/L 30   ALT (SGPT) U/L 33   Estimated Creatinine Clearance: 56.2 mL/min (by C-G formula based on Cr of 0.72).  No results found for: AMMONIA    Results from last 7 days  Lab Units 11/01/17  1314   TROPONIN I ng/mL <0.006         Lab Results   Component Value Date    HGBA1C 5.4 12/14/2014     No results found for: TSH, FREET4  Lab Results   Component Value Date    PREGTESTUR Negative 12/15/2014     Pain Management Panel     There is no flowsheet data to display.                        ---------------------------------------------------------------------------------------------------------------------  Imaging Results (last 7 days)     Procedure Component Value Units Date/Time    XR Chest 2 View [[id number]] Collected:  11/01/17 1347     Updated:  11/01/17 3986    Narrative:       EXAMINATION: XR CHEST 2 VW-      CLINICAL INDICATION: Simple sepsis protocol          COMPARISON: 10/1/2014      TECHNIQUE: XR CHEST 2 VW-      FINDINGS:   Diffuse interstitial markings and minimal bibasilar consolidation   Heart and mediastinal contours are unremarkable.   No pneumothorax.   Bony and soft tissue structures are unremarkable.            Impression:       Bibasilar consolidation     This report was finalized on 11/1/2017 1:47 PM by Dr. Hernández  "MD Ryder.             Cultures:         I have personally reviewed the radiology images and read the final radiology report.  ---------------------------------------------------------------------------------------------------------------------  Assessment and Plan:    -Severe sepsis with HR>90, RR of 24, C-RP elevation and leukocytosis. Likely 2/2 bibasilar pneumonia. Evidence of end organ dysfunction including acute hypoxic respiratory failure.  Lactic acid is 1.3.  Blood cultures have been ordered.   IV Rocephin and IV doxycycline have been ordered.  Sputum culture has been ordered.  C-RP daily was added.  Will add urinalysis for further infectious workup. Repeat labs in the AM.     -Bibasilar pneumonia, community acquired: IV antibiotics as previously outlined.  Sputum culture ordered. Pt is now Mycoplasma (+). Legionella ordered but not yet collected. Place in droplet precautions.  Mucinex added.      -Acute hypoxic respiratory failure with pO2 of 45.5 on initial ABG (on room air): Likely multifactorial given bibasilar pneumonia and acute COPD exacerbation: Continue supplemental oxygen via nasal cannula.  Continue with continuous pulse oximetry.  IV antibiotics as previously outlined. Start IV steroids.     -Acute exacerbation of COPD: IV methylprednisolone 40 mg BID has been added in addition to duoneb treatments. Mucinex ordered as well. Will add PRN Tessalon pearles and Robitussin. Discussed tobacco cessation and pt states she has not smoked in 2 weeks after becoming ill. States she \"hopes\" she can continue not to smoke.     -Hypertension, controlled at present:  Restart home medications when available.       -CAD s/p RCA stent, stable without complaints of chest pain: Cont home medication regimen and monitor on telemetry.     -Peripheral arterial disease:  Bilateral pedal pulses intact.  Denies leg pain at present.     -Hyperlipidemia:  Lipid panel in AM.  Cont home medications.     -Prolonged QTc: Avoid " any potential QT prolonging medications.     -DVT prophylaxis with SQ Heparin    Pt is at high risk 2/2 severe sepsis, Mycoplasma pneumonia, COPD exacerbation, acute hypoxic respiratory failure, CAD, PAD and tobacco abuse.     Stephanie Osman PA-C  17  3:43 PM  ---------------------------------------------------------------------------------------------------------------------     I have reviewed the notes, assessments, and/or procedures performed by Stephanie Osman PA-C. I concur with her/his documentation of Grecia C Grayson.    Michael Berry D.O.          Electronically signed by Michael Berry DO at 2017  5:19 PM        [address] [address]  Phone:  [phone]  Fax:          Patient:     Grecia Galicia MRN:  [id number]   [address] [place] [address] :  1960  SSN:    Phone: [phone] Sex:  F      INSURANCE PAYOR PLAN GROUP # SUBSCRIBER ID   Primary:  Secondary: MEDICARE [employer] [id number]  [id number]   [id number]  [id number]   Admitting Diagnosis: Pneumonia of both lower lobes due to infectious organism [J18.9]  Order Date:  2017               Inpatient Consult to Case Management        (Order ID: [id number])     Diagnosis:         Priority:  Routine Expected Date:   Expiration Date:        Interval:   Count:    Reason for Consult? home oxygen at discharge     Specimen Type:   Specimen Source:   Specimen Taken Date:   Specimen Taken Time:                         Verbal Order Mode: Per protocol: no cosign required  Authorizing Provider:Michael Berry DO  Authorizing Provider's NPI: [id number]  Order Entered By: Sabiha Mcmullen 2017 10:43 AM     Electronically signed by: N/A         oRberta Delgadillo  Signed  Significant Event Date of Service: 2017 10:58 AM         Hide copied text  Jay for attribution information        11/04/17 0928   Oxygen Therapy   SpO2 (!) 78 %  (while ambulating )

## 2017-11-06 ENCOUNTER — TELEPHONE (OUTPATIENT)
Dept: TELEMETRY | Facility: HOSPITAL | Age: 57
End: 2017-11-06

## 2017-11-06 LAB
BACTERIA SPEC AEROBE CULT: NORMAL

## 2017-11-12 LAB
B-LACTAMASE USUAL SUSC ISLT: NEGATIVE
BACTERIA SPEC RESP CULT: ABNORMAL
BACTERIA SPEC RESP CULT: ABNORMAL
GRAM STN SPEC: ABNORMAL

## 2018-03-12 ENCOUNTER — TELEPHONE (OUTPATIENT)
Dept: CARDIOLOGY | Facility: CLINIC | Age: 58
End: 2018-03-12

## 2018-03-12 RX ORDER — CARVEDILOL 25 MG/1
25 TABLET ORAL 2 TIMES DAILY WITH MEALS
Qty: 60 TABLET | Refills: 2 | Status: SHIPPED | OUTPATIENT
Start: 2018-03-12 | End: 2018-05-01 | Stop reason: SDUPTHER

## 2018-03-12 RX ORDER — ATORVASTATIN CALCIUM 40 MG/1
40 TABLET, FILM COATED ORAL NIGHTLY
Qty: 30 TABLET | Refills: 2 | Status: SHIPPED | OUTPATIENT
Start: 2018-03-12 | End: 2018-05-01 | Stop reason: SDUPTHER

## 2018-03-12 NOTE — TELEPHONE ENCOUNTER
Pt called to make a follow up apt and stated that she needed refills on her atorvastatin and coreg. I sent her enough to due her til her apt on 5.1.18.

## 2018-05-01 ENCOUNTER — OFFICE VISIT (OUTPATIENT)
Dept: CARDIOLOGY | Facility: CLINIC | Age: 58
End: 2018-05-01

## 2018-05-01 VITALS
HEIGHT: 55 IN | HEART RATE: 83 BPM | SYSTOLIC BLOOD PRESSURE: 124 MMHG | DIASTOLIC BLOOD PRESSURE: 80 MMHG | BODY MASS INDEX: 24.3 KG/M2 | RESPIRATION RATE: 16 BRPM | WEIGHT: 105 LBS

## 2018-05-01 DIAGNOSIS — I73.9 PERIPHERAL ARTERIAL DISEASE (HCC): ICD-10-CM

## 2018-05-01 DIAGNOSIS — Z72.0 TOBACCO ABUSE: ICD-10-CM

## 2018-05-01 DIAGNOSIS — R07.89 OTHER CHEST PAIN: ICD-10-CM

## 2018-05-01 DIAGNOSIS — I25.10 ASCVD (ARTERIOSCLEROTIC CARDIOVASCULAR DISEASE): Primary | ICD-10-CM

## 2018-05-01 DIAGNOSIS — E78.5 DYSLIPIDEMIA: ICD-10-CM

## 2018-05-01 DIAGNOSIS — Z71.6 TOBACCO ABUSE COUNSELING: ICD-10-CM

## 2018-05-01 PROCEDURE — 93000 ELECTROCARDIOGRAM COMPLETE: CPT | Performed by: PHYSICIAN ASSISTANT

## 2018-05-01 PROCEDURE — 99406 BEHAV CHNG SMOKING 3-10 MIN: CPT | Performed by: PHYSICIAN ASSISTANT

## 2018-05-01 PROCEDURE — 99213 OFFICE O/P EST LOW 20 MIN: CPT | Performed by: PHYSICIAN ASSISTANT

## 2018-05-01 RX ORDER — CLOPIDOGREL BISULFATE 75 MG/1
75 TABLET ORAL DAILY
Qty: 30 TABLET | Refills: 5 | Status: SHIPPED | OUTPATIENT
Start: 2018-05-01 | End: 2020-06-17 | Stop reason: SDUPTHER

## 2018-05-01 RX ORDER — CARVEDILOL 25 MG/1
25 TABLET ORAL 2 TIMES DAILY WITH MEALS
Qty: 60 TABLET | Refills: 5 | Status: SHIPPED | OUTPATIENT
Start: 2018-05-01 | End: 2020-06-17 | Stop reason: SDUPTHER

## 2018-05-01 RX ORDER — ATORVASTATIN CALCIUM 40 MG/1
40 TABLET, FILM COATED ORAL NIGHTLY
Qty: 30 TABLET | Refills: 5 | Status: SHIPPED | OUTPATIENT
Start: 2018-05-01 | End: 2020-06-17 | Stop reason: SDUPTHER

## 2018-05-01 NOTE — PATIENT INSTRUCTIONS
BMI for Adults  Body mass index (BMI) is a number that is calculated from a person's weight and height. In most adults, the number is used to find how much of an adult's weight is made up of fat. BMI is not as accurate as a direct measure of body fat.  HOW IS BMI CALCULATED?  BMI is calculated by dividing weight in kilograms by height in meters squared. It can also be calculated by dividing weight in pounds by height in inches squared, then multiplying the resulting number by 703. Charts are available to help you find your BMI quickly and easily without doing this calculation.   HOW IS BMI INTERPRETED?  Health care professionals use BMI charts to identify whether an adult is underweight, at a normal weight, or overweight based on the following guidelines:  · Underweight: BMI less than 18.5.  · Normal weight: BMI between 18.5 and 24.9.  · Overweight: BMI between 25 and 29.9.  · Obese: BMI of 30 and above.  BMI is usually interpreted the same for males and females.  Weight includes both fat and muscle, so someone with a muscular build, such as an athlete, may have a BMI that is higher than 24.9. In cases like these, BMI may not accurately depict body fat. To determine if excess body fat is the cause of a BMI of 25 or higher, further assessments may need to be done by a health care provider.  WHY IS BMI A USEFUL TOOL?  BMI is used to identify a possible weight problem that may be related to a medical problem or may increase the risk for medical problems. BMI can also be used to promote changes to reach a healthy weight.     This information is not intended to replace advice given to you by your health care provider. Make sure you discuss any questions you have with your health care provider.     Document Released: 08/29/2005 Document Revised: 01/08/2016 Document Reviewed: 05/15/2015  [employer] Interactive Patient Education ©2017 [employer]       Calorie Counting for Weight Loss  Calories are energy you get from the  things you eat and drink. Your body uses this energy to keep you going throughout the day. The number of calories you eat affects your weight. When you eat more calories than your body needs, your body stores the extra calories as fat. When you eat fewer calories than your body needs, your body burns fat to get the energy it needs.  Calorie counting means keeping track of how many calories you eat and drink each day. If you make sure to eat fewer calories than your body needs, you should lose weight. In order for calorie counting to work, you will need to eat the number of calories that are right for you in a day to lose a healthy amount of weight per week. A healthy amount of weight to lose per week is usually 1-2 lb (0.5-0.9 kg). A dietitian can determine how many calories you need in a day and give you suggestions on how to reach your calorie goal.   WHAT IS MY MY PLAN?  My goal is to have __________ calories per day.   If I have this many calories per day, I should lose around __________ pounds per week.  WHAT DO I NEED TO KNOW ABOUT CALORIE COUNTING?  In order to meet your daily calorie goal, you will need to:  · Find out how many calories are in each food you would like to eat. Try to do this before you eat.  · Decide how much of the food you can eat.  · Write down what you ate and how many calories it had. Doing this is called keeping a food log.  WHERE DO I FIND CALORIE INFORMATION?  The number of calories in a food can be found on a Nutrition Facts label. Note that all the information on a label is based on a specific serving of the food. If a food does not have a Nutrition Facts label, try to look up the calories online or ask your dietitian for help.  HOW DO I DECIDE HOW MUCH TO EAT?  To decide how much of the food you can eat, you will need to consider both the number of calories in one serving and the size of one serving. This information can be found on the Nutrition Facts label. If a food does not  have a Nutrition Facts label, look up the information online or ask your dietitian for help.  Remember that calories are listed per serving. If you choose to have more than one serving of a food, you will have to multiply the calories per serving by the amount of servings you plan to eat. For example, the label on a package of bread might say that a serving size is 1 slice and that there are 90 calories in a serving. If you eat 1 slice, you will have eaten 90 calories. If you eat 2 slices, you will have eaten 180 calories.  HOW DO I KEEP A FOOD LOG?  After each meal, record the following information in your food log:  · What you ate.  · How much of it you ate.  · How many calories it had.  · Then, add up your calories.  Keep your food log near you, such as in a small notebook in your pocket. Another option is to use a mobile zeina or website. Some programs will calculate calories for you and show you how many calories you have left each time you add an item to the log.  WHAT ARE SOME CALORIE COUNTING TIPS?  · Use your calories on foods and drinks that will fill you up and not leave you hungry. Some examples of this include foods like nuts and nut butters, vegetables, lean proteins, and high-fiber foods (more than 5 g fiber per serving).  · Eat nutritious foods and avoid empty calories. Empty calories are calories you get from foods or beverages that do not have many nutrients, such as candy and soda. It is better to have a nutritious high-calorie food (such as an avocado) than a food with few nutrients (such as a bag of chips).  · Know how many calories are in the foods you eat most often. This way, you do not have to look up how many calories they have each time you eat them.  · Look out for foods that may seem like low-calorie foods but are really high-calorie foods, such as baked goods, soda, and fat-free candy.  · Pay attention to calories in drinks. Drinks such as sodas, specialty coffee drinks, alcohol, and  juices have a lot of calories yet do not fill you up. Choose low-calorie drinks like water and diet drinks.  · Focus your calorie counting efforts on higher calorie items. Logging the calories in a garden salad that contains only vegetables is less important than calculating the calories in a milk shake.  · Find a way of tracking calories that works for you. Get creative. Most people who are successful find ways to keep track of how much they eat in a day, even if they do not count every calorie.  WHAT ARE SOME PORTION CONTROL TIPS?  · Know how many calories are in a serving. This will help you know how many servings of a certain food you can have.  · Use a measuring cup to measure serving sizes. This is helpful when you start out. With time, you will be able to estimate serving sizes for some foods.  · Take some time to put servings of different foods on your favorite plates, bowls, and cups so you know what a serving looks like.  · Try not to eat straight from a bag or box. Doing this can lead to overeating. Put the amount you would like to eat in a cup or on a plate to make sure you are eating the right portion.  · Use smaller plates, glasses, and bowls to prevent overeating. This is a quick and easy way to practice portion control. If your plate is smaller, less food can fit on it.  · Try not to multitask while eating, such as watching TV or using your computer. If it is time to eat, sit down at a table and enjoy your food. Doing this will help you to start recognizing when you are full. It will also make you more aware of what and how much you are eating.  HOW CAN I CALORIE COUNT WHEN EATING OUT?  · Ask for smaller portion sizes or child-sized portions.  · Consider sharing an entree and sides instead of getting your own entree.  · If you get your own entree, eat only half. Ask for a box at the beginning of your meal and put the rest of your entree in it so you are not tempted to eat it.  · Look for the calories  "on the menu. If calories are listed, choose the lower calorie options.  · Choose dishes that include vegetables, fruits, whole grains, low-fat dairy products, and lean protein. Focusing on smart food choices from each of the 5 food groups can help you stay on track at restaurants.  · Choose items that are boiled, broiled, grilled, or steamed.  · Choose water, milk, unsweetened iced tea, or other drinks without added sugars. If you want an alcoholic beverage, choose a lower calorie option. For example, a regular carmen can have up to 700 calories and a glass of wine has around 150.  · Stay away from items that are buttered, battered, fried, or served with cream sauce. Items labeled \"crispy\" are usually fried, unless stated otherwise.  · Ask for dressings, sauces, and syrups on the side. These are usually very high in calories, so do not eat much of them.  · Watch out for salads. Many people think salads are a healthy option, but this is often not the case. Many salads come with horta, fried chicken, lots of cheese, fried chips, and dressing. All of these items have a lot of calories. If you want a salad, choose a garden salad and ask for grilled meats or steak. Ask for the dressing on the side, or ask for olive oil and vinegar or lemon to use as dressing.  · Estimate how many servings of a food you are given. For example, a serving of cooked rice is ½ cup or about the size of half a tennis ball or one cupcake wrapper. Knowing serving sizes will help you be aware of how much food you are eating at restaurants. The list below tells you how big or small some common portion sizes are based on everyday objects.  ¨ 1 oz--4 stacked dice.  ¨ 3 oz--1 deck of cards.  ¨ 1 tsp--1 dice.  ¨ 1 Tbsp--½ a Ping-Pong ball.  ¨ 2 Tbsp--1 Ping-Pong ball.  ¨ ½ cup--1 tennis ball or 1 cupcake wrapper.  ¨ 1 cup--1 baseball.     This information is not intended to replace advice given to you by your health care provider. Make sure you " discuss any questions you have with your health care provider.     Document Released: 2006 Document Revised: 2016 Document Reviewed: 10/23/2014  [employer] Interactive Patient Education © [employer]     Smoking Hazards    Smoking cigarettes is extremely bad for your health. Tobacco smoke has over 200 known poisons in it. It contains the poisonous gases nitrogen oxide and carbon monoxide. There are over 60 chemicals in tobacco smoke that cause cancer. Some of the chemicals found in cigarette smoke include:   · Cyanide.    · Benzene.    · Formaldehyde.    · Methanol (wood alcohol).    · Acetylene (fuel used in welding torches).    · Ammonia.    Even smoking lightly shortens your life expectancy by several years. You can greatly reduce the risk of medical problems for you and your family by stopping now. Smoking is the most preventable cause of death and disease in our society. Within days of quitting smoking, your circulation improves, you decrease the risk of having a heart attack, and your lung capacity improves. There may be some increased phlegm in the first few days after quitting, and it may take months for your lungs to clear up completely. Quitting for 10 years reduces your risk of developing lung cancer to almost that of a nonsmoker.   WHAT ARE THE RISKS OF SMOKING?  Cigarette smokers have an increased risk of many serious medical problems, including:  · Lung cancer.    · Lung disease (such as pneumonia, bronchitis, and emphysema).    · Heart attack and chest pain due to the heart not getting enough oxygen (angina).    · Heart disease and peripheral blood vessel disease.    · Hypertension.    · Stroke.    · Oral cancer (cancer of the lip, mouth, or voice box).    · Bladder cancer.    · Pancreatic cancer.    · Cervical cancer.    · Pregnancy complications, including premature birth.    · Stillbirths and smaller  babies, birth defects, and genetic damage to sperm.    · Early menopause.     · Lower estrogen level for women.    · Infertility.    · Facial wrinkles.    · Blindness.    · Increased risk of broken bones (fractures).    · Senile dementia.    · Stomach ulcers and internal bleeding.    · Delayed wound healing and increased risk of complications during surgery.  Because of secondhand smoke exposure, children of smokers have an increased risk of the following:   · Sudden infant death syndrome (SIDS).    · Respiratory infections.    · Lung cancer.    · Heart disease.    · Ear infections.    WHY IS SMOKING ADDICTIVE?  Nicotine is the chemical agent in tobacco that is capable of causing addiction or dependence. When you smoke and inhale, nicotine is absorbed rapidly into the bloodstream through your lungs. Both inhaled and noninhaled nicotine may be addictive.   WHAT ARE THE BENEFITS OF QUITTING?   There are many health benefits to quitting smoking. Some are:   · The likelihood of developing cancer and heart disease decreases. Health improvements are seen almost immediately.    · Blood pressure, pulse rate, and breathing patterns start returning to normal soon after quitting.    · People who quit may see an improvement in their overall quality of life.    HOW DO YOU QUIT SMOKING?  Smoking is an addiction with both physical and psychological effects, and longtime habits can be hard to change. Your health care provider can recommend:  · Programs and community resources, which may include group support, education, or therapy.  · Replacement products, such as patches, gum, and nasal sprays. Use these products only as directed. Do not replace cigarette smoking with electronic cigarettes (commonly called e-cigarettes). The safety of e-cigarettes is unknown, and some may contain harmful chemicals.  FOR MORE INFORMATION  · American Lung Association: www.lung.org  · American Cancer Society: www.cancer.org     This information is not intended to replace advice given to you by your health care provider.  Make sure you discuss any questions you have with your health care provider.     Document Released: 01/25/2006 Document Revised: 04/10/2017 Document Reviewed: 06/09/2014  [employer] Interactive Patient Education ©2017 [employer]           Steps to Quit Smoking     Smoking tobacco can be harmful to your health and can affect almost every organ in your body. Smoking puts you, and those around you, at risk for developing many serious chronic diseases. Quitting smoking is difficult, but it is one of the best things that you can do for your health. It is never too late to quit.  WHAT ARE THE BENEFITS OF QUITTING SMOKING?  When you quit smoking, you lower your risk of developing serious diseases and conditions, such as:  · Lung cancer or lung disease, such as COPD.  · Heart disease.  · Stroke.  · Heart attack.  · Infertility.  · Osteoporosis and bone fractures.  Additionally, symptoms such as coughing, wheezing, and shortness of breath may get better when you quit. You may also find that you get sick less often because your body is stronger at fighting off colds and infections. If you are pregnant, quitting smoking can help to reduce your chances of having a baby of low birth weight.  HOW DO I GET READY TO QUIT?  When you decide to quit smoking, create a plan to make sure that you are successful. Before you quit:  · Pick a date to quit. Set a date within the next two weeks to give you time to prepare.  · Write down the reasons why you are quitting. Keep this list in places where you will see it often, such as on your bathroom mirror or in your car or wallet.  · Identify the people, places, things, and activities that make you want to smoke (triggers) and avoid them. Make sure to take these actions:  ¨ Throw away all cigarettes at home, at work, and in your car.  ¨ Throw away smoking accessories, such as ashtrays and lighters.  ¨ Clean your car and make sure to empty the ashtray.  ¨ Clean your home, including curtains and  "carpets.  · Tell your family, friends, and coworkers that you are quitting. Support from your loved ones can make quitting easier.  · Talk with your health care provider about your options for quitting smoking.  · Find out what treatment options are covered by your health insurance.  WHAT STRATEGIES CAN I USE TO QUIT SMOKING?   Talk with your healthcare provider about different strategies to quit smoking. Some strategies include:  · Quitting smoking altogether instead of gradually lessening how much you smoke over a period of time. Research shows that quitting \"cold turkey\" is more successful than gradually quitting.  · Attending in-person counseling to help you build problem-solving skills. You are more likely to have success in quitting if you attend several counseling sessions. Even short sessions of 10 minutes can be effective.  · Finding resources and support systems that can help you to quit smoking and remain smoke-free after you quit. These resources are most helpful when you use them often. They can include:  ¨ Online chats with a counselor.  ¨ Telephone quitlines.  ¨ Printed self-help materials.  ¨ Support groups or group counseling.  ¨ Text messaging programs.  ¨ Mobile phone applications.  · Taking medicines to help you quit smoking. (If you are pregnant or breastfeeding, talk with your health care provider first.) Some medicines contain nicotine and some do not. Both types of medicines help with cravings, but the medicines that include nicotine help to relieve withdrawal symptoms. Your health care provider may recommend:  ¨ Nicotine patches, gum, or lozenges.  ¨ Nicotine inhalers or sprays.  ¨ Non-nicotine medicine that is taken by mouth.  Talk with your health care provider about combining strategies, such as taking medicines while you are also receiving in-person counseling. Using these two strategies together makes you more likely to succeed in quitting than if you used either strategy on its " own.  If you are pregnant or breastfeeding, talk with your health care provider about finding counseling or other support strategies to quit smoking. Do not take medicine to help you quit smoking unless told to do so by your health care provider.  WHAT THINGS CAN I DO TO MAKE IT EASIER TO QUIT?  Quitting smoking might feel overwhelming at first, but there is a lot that you can do to make it easier. Take these important actions:  · Reach out to your family and friends and ask that they support and encourage you during this time. Call telephone quitlines, reach out to support groups, or work with a counselor for support.  · Ask people who smoke to avoid smoking around you.  · Avoid places that trigger you to smoke, such as bars, parties, or smoke-break areas at work.  · Spend time around people who do not smoke.  · Lessen stress in your life, because stress can be a smoking trigger for some people. To lessen stress, try:  ¨ Exercising regularly.  ¨ Deep-breathing exercises.  ¨ Yoga.  ¨ Meditating.  ¨ Performing a body scan. This involves closing your eyes, scanning your body from head to toe, and noticing which parts of your body are particularly tense. Purposefully relax the muscles in those areas.  · Download or purchase mobile phone or tablet apps (applications) that can help you stick to your quit plan by providing reminders, tips, and encouragement. There are many free apps, such as QuitGuide from the CDC (Centers for Disease Control and Prevention). You can find other support for quitting smoking (smoking cessation) through smokefree.gov and other websites.  HOW WILL I FEEL WHEN I QUIT SMOKING?  Within the first 24 hours of quitting smoking, you may start to feel some withdrawal symptoms. These symptoms are usually most noticeable 2-3 days after quitting, but they usually do not last beyond 2-3 weeks. Changes or symptoms that you might experience include:  · Mood swings.  · Restlessness, anxiety, or  irritation.  · Difficulty concentrating.  · Dizziness.  · Strong cravings for sugary foods in addition to nicotine.  · Mild weight gain.  · Constipation.  · Nausea.  · Coughing or a sore throat.  · Changes in how your medicines work in your body.  · A depressed mood.  · Difficulty sleeping (insomnia).  After the first 2-3 weeks of quitting, you may start to notice more positive results, such as:  · Improved sense of smell and taste.  · Decreased coughing and sore throat.  · Slower heart rate.  · Lower blood pressure.  · Clearer skin.  · The ability to breathe more easily.  · Fewer sick days.  Quitting smoking is very challenging for most people. Do not get discouraged if you are not successful the first time. Some people need to make many attempts to quit before they achieve long-term success. Do your best to stick to your quit plan, and talk with your health care provider if you have any questions or concerns.     This information is not intended to replace advice given to you by your health care provider. Make sure you discuss any questions you have with your health care provider.     Document Released: 12/12/2002 Document Revised: 05/03/2016 Document Reviewed: 05/03/2016  [employer] Interactive Patient Education ©2017 [employer]

## 2018-05-01 NOTE — PROGRESS NOTES
BUDDY Cutler  Grecia Galicia  1960 05/01/2018    Patient Active Problem List   Diagnosis   • Essential hypertension   • Chest pain   • Pneumonia of both lower lobes due to infectious organism     Dear BUDDY Cutler:    Chief Complaint   Patient presents with   • Follow-up     10 mo   • Chest Pain     R side of chest   • Shortness of Breath     routine activity   • Other     meds., presented     Subjective     Grecia Galicia is a 57 y.o. female with a past medical history significant for previous reported myocardial infarction with subsequent stenting, complete details unavailable, peripheral arterial disease status post intervention to the lower extremities, complete details unavailable, hypertension, dyslipidemia, and tobacco abuse.  She presents to the office today for routine follow-up.  She complains of right sided chest pain has been occurring off and on since she had pneumonia in the hospital a few months ago.  It is not associated with exertion.  It is short, quick, and resolves spontaneously.  No associated nausea, vomiting, diaphoresis, or shortness of breath.  No radiation into the arm, neck, back, or left/central chest.       Current Outpatient Prescriptions:   •  albuterol (PROVENTIL HFA;VENTOLIN HFA) 108 (90 BASE) MCG/ACT inhaler, Inhale 2 puffs every 4 (four) hours as needed for wheezing., Disp: , Rfl:   •  aspirin 81 MG tablet, Take 81 mg by mouth Every Night., Disp: , Rfl:   •  atorvastatin (LIPITOR) 40 MG tablet, Take 1 tablet by mouth Every Night., Disp: 30 tablet, Rfl: 5  •  budesonide-formoterol (SYMBICORT) 160-4.5 MCG/ACT inhaler, Inhale 2 puffs 2 (Two) Times a Day., Disp: , Rfl:   •  carvedilol (COREG) 25 MG tablet, Take 1 tablet by mouth 2 (Two) Times a Day With Meals., Disp: 60 tablet, Rfl: 5  •  clopidogrel (PLAVIX) 75 MG tablet, Take 1 tablet by mouth Daily., Disp: 30 tablet, Rfl: 5  •  guaifenesin-dextromethorphan (ROBITUSSIN DM) 100-10 MG/5ML syrup,  "Take 5 mL by mouth Every 4 (Four) Hours As Needed for Cough., Disp: 236 mL, Rfl: 0  •  loratadine (CLARITIN) 10 MG tablet, Take 10 mg by mouth Every Night., Disp: , Rfl:   •  nitroglycerin (NITROSTAT) 0.4 MG SL tablet, Place 1 tablet under the tongue Every 5 (Five) Minutes As Needed for Chest Pain for up to 25 doses. 1tab q 5mins X3., Disp: 25 tablet, Rfl: 1    The following portions of the patient's history were reviewed and updated as appropriate: allergies, current medications, past family history, past medical history, past social history, past surgical history and problem list.    Social History     Social History   • Marital status:      Spouse name: N/A   • Number of children: N/A   • Years of education: N/A     Occupational History   • Not on file.     Social History Main Topics   • Smoking status: Current Every Day Smoker     Packs/day: 1.00     Years: 40.00     Types: Cigarettes   • Smokeless tobacco: Never Used   • Alcohol use No   • Drug use: No   • Sexual activity: Defer     Other Topics Concern   • Not on file     Social History Narrative   • No narrative on file     Review of Systems   Cardiovascular: Positive for chest pain. Negative for leg swelling and palpitations.   Respiratory: Positive for cough, shortness of breath and sputum production.      Objective   Blood pressure 124/80, pulse 83, resp. rate 16, height 121.9 cm (48\"), weight 47.6 kg (105 lb).  Body mass index is 32.04 kg/m².    Physical Exam   Constitutional: She is oriented to person, place, and time. She appears well-developed and well-nourished. No distress.   HENT:   Head: Normocephalic and atraumatic.   Eyes: Conjunctivae are normal. Right eye exhibits no discharge. Left eye exhibits no discharge.   Neck: Normal range of motion. Neck supple. Carotid bruit is not present.   Cardiovascular: Normal rate, regular rhythm and normal heart sounds.  Exam reveals no gallop and no friction rub.    No murmur heard.  Pulmonary/Chest: " Effort normal and breath sounds normal. No respiratory distress. She has no wheezes. She has no rales. She exhibits no tenderness.   Decreased air movement bilaterally.    Musculoskeletal: Normal range of motion. She exhibits no edema.   Neurological: She is alert and oriented to person, place, and time.   Skin: Skin is warm and dry. No rash noted. She is not diaphoretic. No erythema. No pallor.   Psychiatric: She has a normal mood and affect. Her behavior is normal.   Nursing note and vitals reviewed.      ECG 12 Lead  Date/Time: 5/1/2018 2:31 PM  Performed by: VALENTINO HUTCHINSON  Authorized by: VALENTINO HUTCHINSON   Comparison: compared with previous ECG   Similar to previous ECG  Rhythm: sinus rhythm  Rate: normal  BPM: 68  ST Elevation: II, III and aVF  T depression: aVL and V1  T flattening: V2 and I  QRS axis: normal  Clinical impression: non-specific ECG  Comments: Sinus rhythm at a rate of 68 bpm with high QRS voltage and chronic nonspecific ST/T-wave changes with minimal ST elevation in inferior leads and nonspecific T-wave inversion and flattening in septal and lateral leads.   ms.          Assessment:        Diagnosis Plan   1. ASCVD (arteriosclerotic cardiovascular disease)  ECG 12 Lead    Status post previous reported myocardial infarction and stenting, complete details unavailable.   2. Other chest pain  ECG 12 Lead    Atypical for angina.   3. Peripheral arterial disease      Status post previous intervention.    4. Dyslipidemia     5. Tobacco abuse     6. Tobacco abuse counseling          Plan:       I advised the patient of the risks in continuing to use tobacco, and I provided this patient with smoking cessation educational materials. During this visit, I spent >3 minutes counseling the patient regarding smoking cessation.  Continue aspirin, Plavix, carvedilol, atorvastatin.  Since her symptoms are very atypical for angina and her EKG does not reveal any significant changes, we will  follow further evaluation for now and continue with medical therapy and monitoring.  Follow up in 5-6 months or sooner if needed.    No Follow-up on file.    I appreciate the opportunity to participate in this patient's cardiovascular care.    Best Regards,    Stephanie Storey PA-C

## 2018-07-24 ENCOUNTER — HOSPITAL ENCOUNTER (EMERGENCY)
Facility: HOSPITAL | Age: 58
Discharge: HOME | End: 2018-07-24
Attending: INTERNAL MEDICINE | Admitting: INTERNAL MEDICINE

## 2018-07-24 VITALS
HEIGHT: 59 IN | DIASTOLIC BLOOD PRESSURE: 70 MMHG | BODY MASS INDEX: 21.17 KG/M2 | OXYGEN SATURATION: 96 % | RESPIRATION RATE: 18 BRPM | SYSTOLIC BLOOD PRESSURE: 130 MMHG | TEMPERATURE: 98.2 F | WEIGHT: 105 LBS | HEART RATE: 87 BPM

## 2018-07-24 DIAGNOSIS — L02.412 ABSCESS OF LEFT AXILLA: Primary | ICD-10-CM

## 2018-07-24 PROCEDURE — 99282 EMERGENCY DEPT VISIT SF MDM: CPT

## 2018-07-24 PROCEDURE — 99283 EMERGENCY DEPT VISIT LOW MDM: CPT

## 2018-07-24 RX ORDER — LIDOCAINE HYDROCHLORIDE 10 MG/ML
10 INJECTION, SOLUTION EPIDURAL; INFILTRATION; INTRACAUDAL; PERINEURAL ONCE
Status: COMPLETED | OUTPATIENT
Start: 2018-07-24 | End: 2018-07-24

## 2018-07-24 RX ORDER — SULFAMETHOXAZOLE AND TRIMETHOPRIM 800; 160 MG/1; MG/1
TABLET ORAL
Qty: 40 TABLET | Refills: 0 | Status: SHIPPED | OUTPATIENT
Start: 2018-07-24 | End: 2021-03-17

## 2018-07-24 RX ADMIN — LIDOCAINE HYDROCHLORIDE 10 ML: 10 INJECTION, SOLUTION EPIDURAL; INFILTRATION; INTRACAUDAL; PERINEURAL at 18:50

## 2018-08-08 ENCOUNTER — LAB (OUTPATIENT)
Dept: LAB | Facility: HOSPITAL | Age: 58
End: 2018-08-08

## 2018-08-08 ENCOUNTER — TRANSCRIBE ORDERS (OUTPATIENT)
Dept: ADMINISTRATIVE | Facility: HOSPITAL | Age: 58
End: 2018-08-08

## 2018-08-08 DIAGNOSIS — E78.2 MIXED HYPERLIPIDEMIA: Primary | ICD-10-CM

## 2018-08-08 DIAGNOSIS — J44.9 CHRONIC OBSTRUCTIVE PULMONARY DISEASE, UNSPECIFIED COPD TYPE (HCC): ICD-10-CM

## 2018-08-08 DIAGNOSIS — Z72.0 TOBACCO USE: ICD-10-CM

## 2018-08-08 DIAGNOSIS — E78.2 MIXED HYPERLIPIDEMIA: ICD-10-CM

## 2018-08-08 DIAGNOSIS — R06.02 SOB (SHORTNESS OF BREATH) ON EXERTION: ICD-10-CM

## 2018-08-08 LAB
ALBUMIN SERPL-MCNC: 4.4 G/DL (ref 3.5–5)
ALBUMIN/GLOB SERPL: 1.6 G/DL (ref 1.5–2.5)
ALP SERPL-CCNC: 160 U/L (ref 35–104)
ALT SERPL W P-5'-P-CCNC: 25 U/L (ref 10–36)
ANION GAP SERPL CALCULATED.3IONS-SCNC: 2.9 MMOL/L (ref 3.6–11.2)
AST SERPL-CCNC: 22 U/L (ref 10–30)
BASOPHILS # BLD AUTO: 0.06 10*3/MM3 (ref 0–0.3)
BASOPHILS NFR BLD AUTO: 0.6 % (ref 0–2)
BILIRUB SERPL-MCNC: 0.2 MG/DL (ref 0.2–1.8)
BUN BLD-MCNC: 20 MG/DL (ref 7–21)
BUN/CREAT SERPL: 25.3 (ref 7–25)
CALCIUM SPEC-SCNC: 9.7 MG/DL (ref 7.7–10)
CHLORIDE SERPL-SCNC: 108 MMOL/L (ref 99–112)
CHOLEST SERPL-MCNC: 151 MG/DL (ref 0–200)
CO2 SERPL-SCNC: 28.1 MMOL/L (ref 24.3–31.9)
CREAT BLD-MCNC: 0.79 MG/DL (ref 0.43–1.29)
DEPRECATED RDW RBC AUTO: 46.5 FL (ref 37–54)
EOSINOPHIL # BLD AUTO: 0.39 10*3/MM3 (ref 0–0.7)
EOSINOPHIL NFR BLD AUTO: 4.1 % (ref 0–5)
ERYTHROCYTE [DISTWIDTH] IN BLOOD BY AUTOMATED COUNT: 13.6 % (ref 11.5–14.5)
GFR SERPL CREATININE-BSD FRML MDRD: 75 ML/MIN/1.73
GLOBULIN UR ELPH-MCNC: 2.8 GM/DL
GLUCOSE BLD-MCNC: 103 MG/DL (ref 70–110)
HCT VFR BLD AUTO: 43.8 % (ref 37–47)
HDLC SERPL-MCNC: 47 MG/DL (ref 60–100)
HGB BLD-MCNC: 14.6 G/DL (ref 12–16)
IMM GRANULOCYTES # BLD: 0.01 10*3/MM3 (ref 0–0.03)
IMM GRANULOCYTES NFR BLD: 0.1 % (ref 0–0.5)
LDLC SERPL CALC-MCNC: 67 MG/DL (ref 0–100)
LDLC/HDLC SERPL: 1.42 {RATIO}
LYMPHOCYTES # BLD AUTO: 3.28 10*3/MM3 (ref 1–3)
LYMPHOCYTES NFR BLD AUTO: 34.8 % (ref 21–51)
MCH RBC QN AUTO: 32.1 PG (ref 27–33)
MCHC RBC AUTO-ENTMCNC: 33.3 G/DL (ref 33–37)
MCV RBC AUTO: 96.3 FL (ref 80–94)
MONOCYTES # BLD AUTO: 0.59 10*3/MM3 (ref 0.1–0.9)
MONOCYTES NFR BLD AUTO: 6.3 % (ref 0–10)
NEUTROPHILS # BLD AUTO: 5.1 10*3/MM3 (ref 1.4–6.5)
NEUTROPHILS NFR BLD AUTO: 54.1 % (ref 30–70)
OSMOLALITY SERPL CALC.SUM OF ELEC: 280.4 MOSM/KG (ref 273–305)
PLATELET # BLD AUTO: 213 10*3/MM3 (ref 130–400)
PMV BLD AUTO: 11.5 FL (ref 6–10)
POTASSIUM BLD-SCNC: 4.6 MMOL/L (ref 3.5–5.3)
PROT SERPL-MCNC: 7.2 G/DL (ref 6–8)
RBC # BLD AUTO: 4.55 10*6/MM3 (ref 4.2–5.4)
SODIUM BLD-SCNC: 139 MMOL/L (ref 135–153)
TRIGL SERPL-MCNC: 186 MG/DL (ref 0–150)
TSH SERPL DL<=0.05 MIU/L-ACNC: 3.5 MIU/ML (ref 0.55–4.78)
VLDLC SERPL-MCNC: 37.2 MG/DL
WBC NRBC COR # BLD: 9.43 10*3/MM3 (ref 4.5–12.5)

## 2018-08-08 PROCEDURE — 84443 ASSAY THYROID STIM HORMONE: CPT

## 2018-08-08 PROCEDURE — 80061 LIPID PANEL: CPT

## 2018-08-08 PROCEDURE — 85025 COMPLETE CBC W/AUTO DIFF WBC: CPT

## 2018-08-08 PROCEDURE — 80053 COMPREHEN METABOLIC PANEL: CPT

## 2018-08-08 PROCEDURE — 36415 COLL VENOUS BLD VENIPUNCTURE: CPT

## 2019-01-04 ENCOUNTER — TRANSCRIBE ORDERS (OUTPATIENT)
Dept: ADMINISTRATIVE | Facility: HOSPITAL | Age: 59
End: 2019-01-04

## 2019-01-04 ENCOUNTER — LAB (OUTPATIENT)
Dept: LAB | Facility: HOSPITAL | Age: 59
End: 2019-01-04

## 2019-01-04 DIAGNOSIS — J44.9 OBSTRUCTIVE CHRONIC BRONCHITIS WITHOUT EXACERBATION (HCC): ICD-10-CM

## 2019-01-04 DIAGNOSIS — E78.2 MIXED HYPERLIPIDEMIA: ICD-10-CM

## 2019-01-04 DIAGNOSIS — R73.09 OTHER ABNORMAL GLUCOSE: ICD-10-CM

## 2019-01-04 DIAGNOSIS — E78.2 MIXED HYPERLIPIDEMIA: Primary | ICD-10-CM

## 2019-01-04 DIAGNOSIS — R94.4 NONSPECIFIC ABNORMAL RESULTS OF KIDNEY FUNCTION STUDY: ICD-10-CM

## 2019-01-04 LAB
ALBUMIN SERPL-MCNC: 4.5 G/DL (ref 3.5–5)
ALBUMIN/GLOB SERPL: 1.8 G/DL (ref 1.5–2.5)
ALP SERPL-CCNC: 161 U/L (ref 35–104)
ALT SERPL W P-5'-P-CCNC: 18 U/L (ref 10–36)
ANION GAP SERPL CALCULATED.3IONS-SCNC: 4.7 MMOL/L (ref 3.6–11.2)
AST SERPL-CCNC: 18 U/L (ref 10–30)
BASOPHILS # BLD AUTO: 0.05 10*3/MM3 (ref 0–0.3)
BASOPHILS NFR BLD AUTO: 0.8 % (ref 0–2)
BILIRUB SERPL-MCNC: 0.2 MG/DL (ref 0.2–1.8)
BUN BLD-MCNC: 14 MG/DL (ref 7–21)
BUN/CREAT SERPL: 20 (ref 7–25)
CALCIUM SPEC-SCNC: 9.3 MG/DL (ref 7.7–10)
CHLORIDE SERPL-SCNC: 110 MMOL/L (ref 99–112)
CHOLEST SERPL-MCNC: 131 MG/DL (ref 0–200)
CO2 SERPL-SCNC: 26.3 MMOL/L (ref 24.3–31.9)
CREAT BLD-MCNC: 0.7 MG/DL (ref 0.43–1.29)
DEPRECATED RDW RBC AUTO: 46.7 FL (ref 37–54)
EOSINOPHIL # BLD AUTO: 0.31 10*3/MM3 (ref 0–0.7)
EOSINOPHIL NFR BLD AUTO: 4.8 % (ref 0–5)
ERYTHROCYTE [DISTWIDTH] IN BLOOD BY AUTOMATED COUNT: 13.6 % (ref 11.5–14.5)
GFR SERPL CREATININE-BSD FRML MDRD: 86 ML/MIN/1.73
GLOBULIN UR ELPH-MCNC: 2.5 GM/DL
GLUCOSE BLD-MCNC: 82 MG/DL (ref 70–110)
HBA1C MFR BLD: 5.8 % (ref 4.5–5.7)
HCT VFR BLD AUTO: 43 % (ref 37–47)
HDLC SERPL-MCNC: 50 MG/DL (ref 60–100)
HGB BLD-MCNC: 14.2 G/DL (ref 12–16)
IMM GRANULOCYTES # BLD AUTO: 0.01 10*3/MM3 (ref 0–0.03)
IMM GRANULOCYTES NFR BLD AUTO: 0.2 % (ref 0–0.5)
LDLC SERPL CALC-MCNC: 59 MG/DL (ref 0–100)
LDLC/HDLC SERPL: 1.18 {RATIO}
LYMPHOCYTES # BLD AUTO: 2.26 10*3/MM3 (ref 1–3)
LYMPHOCYTES NFR BLD AUTO: 34.9 % (ref 21–51)
MCH RBC QN AUTO: 31.8 PG (ref 27–33)
MCHC RBC AUTO-ENTMCNC: 33 G/DL (ref 33–37)
MCV RBC AUTO: 96.4 FL (ref 80–94)
MONOCYTES # BLD AUTO: 0.46 10*3/MM3 (ref 0.1–0.9)
MONOCYTES NFR BLD AUTO: 7.1 % (ref 0–10)
NEUTROPHILS # BLD AUTO: 3.38 10*3/MM3 (ref 1.4–6.5)
NEUTROPHILS NFR BLD AUTO: 52.2 % (ref 30–70)
OSMOLALITY SERPL CALC.SUM OF ELEC: 280.8 MOSM/KG (ref 273–305)
PLATELET # BLD AUTO: 172 10*3/MM3 (ref 130–400)
PMV BLD AUTO: 11.7 FL (ref 6–10)
POTASSIUM BLD-SCNC: 3.8 MMOL/L (ref 3.5–5.3)
PROT SERPL-MCNC: 7 G/DL (ref 6–8)
RBC # BLD AUTO: 4.46 10*6/MM3 (ref 4.2–5.4)
SODIUM BLD-SCNC: 141 MMOL/L (ref 135–153)
TRIGL SERPL-MCNC: 111 MG/DL (ref 0–150)
TSH SERPL DL<=0.05 MIU/L-ACNC: 2.26 MIU/ML (ref 0.55–4.78)
VLDLC SERPL-MCNC: 22.2 MG/DL
WBC NRBC COR # BLD: 6.47 10*3/MM3 (ref 4.5–12.5)

## 2019-01-04 PROCEDURE — 84443 ASSAY THYROID STIM HORMONE: CPT

## 2019-01-04 PROCEDURE — 36415 COLL VENOUS BLD VENIPUNCTURE: CPT

## 2019-01-04 PROCEDURE — 80061 LIPID PANEL: CPT

## 2019-01-04 PROCEDURE — 80053 COMPREHEN METABOLIC PANEL: CPT

## 2019-01-04 PROCEDURE — 85025 COMPLETE CBC W/AUTO DIFF WBC: CPT

## 2019-01-04 PROCEDURE — 83036 HEMOGLOBIN GLYCOSYLATED A1C: CPT

## 2019-01-17 ENCOUNTER — OFFICE VISIT (OUTPATIENT)
Dept: CARDIOLOGY | Facility: CLINIC | Age: 59
End: 2019-01-17

## 2019-01-17 VITALS
HEART RATE: 72 BPM | SYSTOLIC BLOOD PRESSURE: 102 MMHG | DIASTOLIC BLOOD PRESSURE: 73 MMHG | BODY MASS INDEX: 21.41 KG/M2 | HEIGHT: 59 IN | RESPIRATION RATE: 16 BRPM | WEIGHT: 106.2 LBS

## 2019-01-17 DIAGNOSIS — Z72.0 TOBACCO ABUSE: ICD-10-CM

## 2019-01-17 DIAGNOSIS — E78.5 DYSLIPIDEMIA: ICD-10-CM

## 2019-01-17 DIAGNOSIS — I10 ESSENTIAL HYPERTENSION: Primary | ICD-10-CM

## 2019-01-17 DIAGNOSIS — R07.2 PRECORDIAL PAIN: ICD-10-CM

## 2019-01-17 PROCEDURE — 93000 ELECTROCARDIOGRAM COMPLETE: CPT | Performed by: PHYSICIAN ASSISTANT

## 2019-01-17 PROCEDURE — 99214 OFFICE O/P EST MOD 30 MIN: CPT | Performed by: PHYSICIAN ASSISTANT

## 2019-01-17 PROCEDURE — 99406 BEHAV CHNG SMOKING 3-10 MIN: CPT | Performed by: PHYSICIAN ASSISTANT

## 2019-01-17 RX ORDER — AMLODIPINE BESYLATE 5 MG/1
5 TABLET ORAL DAILY
COMMUNITY
End: 2020-06-17 | Stop reason: SDUPTHER

## 2019-01-17 RX ORDER — NITROGLYCERIN 0.4 MG/1
0.4 TABLET SUBLINGUAL
Qty: 25 TABLET | Refills: 1 | Status: SHIPPED | OUTPATIENT
Start: 2019-01-17 | End: 2022-03-22 | Stop reason: SDUPTHER

## 2019-01-17 NOTE — PROGRESS NOTES
Deonna Lucio APRN  Grecia Galicia  1960 01/17/2019    Patient Active Problem List   Diagnosis   • Essential hypertension   • Chest pain   • Pneumonia of both lower lobes due to infectious organism (CMS/HCC)   • Dyslipidemia   • Tobacco abuse       Dear Deonna Lucio APRN:    Subjective       History of Present Illness:    Chief Complaint   Patient presents with   • ASCVD       Grecia Galicia is a pleasant 58 y.o. female with a past medical history significant for previous reported myocardial infarction with subsequent stenting, complete details unavailable, peripheral arterial disease status post intervention to the lower extremities, complete details unavailable, hypertension, dyslipidemia, and tobacco abuse.  She presents to the office today for routine follow-up.    Patient comes in today with compliants of chest pain. She staets that the pain is on the right side. She states that she did have a heart attack in the past and the pain she had then was on the left side and it radated to her left arm. This pain stays in the center of her chest. She describes this pain as a sharp pain. She has not noticed any patterns that will bring on the pain such as exertion and seem to come on at random. She states that the pain will last roughly 5-10 minutes before she takes a nitroglycrin sublingual that resolves the pain. She does admit to diaphoresis but denies shortness of breath when the pain comes on. She also reports that this pain comes on about roughly every 4-5 days.     Allergies   Allergen Reactions   • No Known Drug Allergy    :      Current Outpatient Medications:   •  albuterol (PROVENTIL HFA;VENTOLIN HFA) 108 (90 BASE) MCG/ACT inhaler, Inhale 2 puffs every 4 (four) hours as needed for wheezing., Disp: , Rfl:   •  amLODIPine (NORVASC) 5 MG tablet, Take 5 mg by mouth Daily., Disp: , Rfl:   •  aspirin 81 MG tablet, Take 81 mg by mouth Every Night., Disp: , Rfl:   •  atorvastatin (LIPITOR)  40 MG tablet, Take 1 tablet by mouth Every Night., Disp: 30 tablet, Rfl: 5  •  budesonide-formoterol (SYMBICORT) 160-4.5 MCG/ACT inhaler, Inhale 2 puffs 2 (Two) Times a Day., Disp: , Rfl:   •  carvedilol (COREG) 25 MG tablet, Take 1 tablet by mouth 2 (Two) Times a Day With Meals., Disp: 60 tablet, Rfl: 5  •  clopidogrel (PLAVIX) 75 MG tablet, Take 1 tablet by mouth Daily., Disp: 30 tablet, Rfl: 5  •  guaifenesin-dextromethorphan (ROBITUSSIN DM) 100-10 MG/5ML syrup, Take 5 mL by mouth Every 4 (Four) Hours As Needed for Cough., Disp: 236 mL, Rfl: 0  •  loratadine (CLARITIN) 10 MG tablet, Take 10 mg by mouth Every Night., Disp: , Rfl:   •  nitroglycerin (NITROSTAT) 0.4 MG SL tablet, Place 1 tablet under the tongue Every 5 (Five) Minutes As Needed for Chest Pain for up to 25 doses. 1tab q 5mins X3., Disp: 25 tablet, Rfl: 1  •  sulfamethoxazole-trimethoprim (BACTRIM DS,SEPTRA DS) 800-160 MG per tablet, 2 tabs PO BID, Disp: 40 tablet, Rfl: 0      The following portions of the patient's history were reviewed and updated as appropriate: allergies, current medications, past family history, past medical history, past social history, past surgical history and problem list.    Social History     Tobacco Use   • Smoking status: Current Every Day Smoker     Packs/day: 1.00     Years: 40.00     Pack years: 40.00     Types: Cigarettes   • Smokeless tobacco: Never Used   Substance Use Topics   • Alcohol use: No   • Drug use: No       Review of Systems   Constitution: Negative for weakness and malaise/fatigue.   Cardiovascular: Negative for chest pain, dyspnea on exertion, irregular heartbeat, leg swelling, palpitations and syncope.   Respiratory: Positive for shortness of breath. Negative for cough.    Hematologic/Lymphatic: Negative for bleeding problem. Does not bruise/bleed easily.   Gastrointestinal: Negative for nausea and vomiting.   Neurological: Negative for dizziness.       Objective   Vitals:    01/17/19 1500   BP: 102/73  "  BP Location: Left arm   Pulse: 72   Resp: 16   Weight: 48.2 kg (106 lb 3.2 oz)   Height: 149.9 cm (59.02\")     Body mass index is 21.44 kg/m².        Physical Exam   Constitutional: She is oriented to person, place, and time. She appears well-developed and well-nourished. No distress.   HENT:   Head: Normocephalic and atraumatic.   Cardiovascular: Normal rate, regular rhythm, normal heart sounds and intact distal pulses.   Pulmonary/Chest: Effort normal and breath sounds normal. No respiratory distress.   Musculoskeletal: She exhibits no edema.   Neurological: She is alert and oriented to person, place, and time.   Skin: She is not diaphoretic.       Lab Results   Component Value Date     01/04/2019    K 3.8 01/04/2019     01/04/2019    CO2 26.3 01/04/2019    BUN 14 01/04/2019    CREATININE 0.70 01/04/2019    GLUCOSE 82 01/04/2019    CALCIUM 9.3 01/04/2019    AST 18 01/04/2019    ALT 18 01/04/2019    ALKPHOS 161 (H) 01/04/2019     No results found for: CKTOTAL  Lab Results   Component Value Date    WBC 6.47 01/04/2019    HGB 14.2 01/04/2019    HCT 43.0 01/04/2019     01/04/2019     Lab Results   Component Value Date    INR 1.00 12/14/2014     Lab Results   Component Value Date    MG 2.2 11/03/2017     Lab Results   Component Value Date    TSH 2.263 01/04/2019    TRIG 111 01/04/2019    HDL 50 (L) 01/04/2019    LDL 59 01/04/2019      Lab Results   Component Value Date    BNP 22.0 11/01/2017       During this visit the following were done:  Labs Reviewed [x]    Labs Ordered []    Radiology Reports Reviewed [x]    Radiology Ordered []    PCP Records Reviewed []    Referring Provider Records Reviewed []    ER Records Reviewed []    Hospital Records Reviewed []    History Obtained From Family []    Radiology Images Reviewed []    Other Reviewed []    Records Requested []         ECG 12 Lead  Date/Time: 1/17/2019 3:03 PM  Performed by: Gavino Casarez PA-C  Authorized by: Gavino Casarez PA-C "   Rhythm: sinus rhythm  Conduction: conduction normal  ST Segments: ST segments normal  T depression: aVL and V1  T flattening: V2  Clinical impression: normal ECG and non-specific ECG          Assessment/Plan    Diagnosis Plan   1. Essential hypertension     2. Precordial pain  Stress Test With Myocardial Perfusion   3. Dyslipidemia     4. Tobacco abuse            Recommendations:  1. Will evaluate patient's chest pain with stress test and refilled her nitroglycrin  2. I spent 5 minutes discussing patient with the patient the increased risk tobacco has on her cardiovascular. I discussed with her the bennefits she will have if ricci were to quit. She does admit that she has cut back on smoking and will continue to cutting back.   3. Continue lipitor, coreg, plavix, aspirin, and amlodipine.   4. Follow up in 4 weeks.     Patient's Body mass index is 21.44 kg/m². BMI is within normal parameters. No follow-up required..     Return in about 4 weeks (around 2/14/2019).    As always, I appreciate very much the opportunity to participate in the cardiovascular care of your patients.      With Best Regards,    REYNALDO Larson disclaimer:  Much of this encounter note is an electronic transcription/translation of spoken language to printed text. The electronic translation of spoken language may permit erroneous, or at times, nonsensical words or phrases to be inadvertently transcribed; Although I have reviewed the note for such errors, some may still exist.

## 2019-02-12 ENCOUNTER — APPOINTMENT (OUTPATIENT)
Dept: NUCLEAR MEDICINE | Facility: HOSPITAL | Age: 59
End: 2019-02-12

## 2019-02-12 ENCOUNTER — APPOINTMENT (OUTPATIENT)
Dept: CARDIOLOGY | Facility: HOSPITAL | Age: 59
End: 2019-02-12

## 2019-03-07 ENCOUNTER — HOSPITAL ENCOUNTER (OUTPATIENT)
Dept: CARDIOLOGY | Facility: HOSPITAL | Age: 59
Discharge: HOME | End: 2019-03-07

## 2019-03-07 ENCOUNTER — HOSPITAL ENCOUNTER (OUTPATIENT)
Dept: NUCLEAR MEDICINE | Facility: HOSPITAL | Age: 59
Discharge: HOME | End: 2019-03-07

## 2019-03-07 DIAGNOSIS — R07.2 PRECORDIAL PAIN: ICD-10-CM

## 2019-03-07 LAB
BH CV NUCLEAR PRIOR STUDY: 3
BH CV STRESS BP STAGE 1: NORMAL
BH CV STRESS BP STAGE 2: NORMAL
BH CV STRESS COMMENTS STAGE 1: NORMAL
BH CV STRESS COMMENTS STAGE 2: NORMAL
BH CV STRESS DOSE REGADENOSON STAGE 1: 0.4
BH CV STRESS DURATION MIN STAGE 1: 0
BH CV STRESS DURATION MIN STAGE 2: 4
BH CV STRESS DURATION SEC STAGE 1: 10
BH CV STRESS DURATION SEC STAGE 2: 0
BH CV STRESS HR STAGE 1: 112
BH CV STRESS HR STAGE 2: 89
BH CV STRESS PROTOCOL 1: NORMAL
BH CV STRESS RECOVERY BP: NORMAL MMHG
BH CV STRESS RECOVERY HR: 89 BPM
BH CV STRESS STAGE 1: 1
BH CV STRESS STAGE 2: 2
MAXIMAL PREDICTED HEART RATE: 163 BPM
PERCENT MAX PREDICTED HR: 68.71 %
STRESS BASELINE BP: NORMAL MMHG
STRESS BASELINE HR: 76 BPM
STRESS PERCENT HR: 81 %
STRESS POST PEAK BP: NORMAL MMHG
STRESS POST PEAK HR: 112 BPM
STRESS TARGET HR: 139 BPM

## 2019-03-07 PROCEDURE — 93018 CV STRESS TEST I&R ONLY: CPT | Performed by: INTERNAL MEDICINE

## 2019-03-07 PROCEDURE — 93017 CV STRESS TEST TRACING ONLY: CPT

## 2019-03-07 PROCEDURE — 0 TECHNETIUM SESTAMIBI: Performed by: PHYSICIAN ASSISTANT

## 2019-03-07 PROCEDURE — A9500 TC99M SESTAMIBI: HCPCS | Performed by: PHYSICIAN ASSISTANT

## 2019-03-07 PROCEDURE — 25010000002 REGADENOSON 0.4 MG/5ML SOLUTION: Performed by: PHYSICIAN ASSISTANT

## 2019-03-07 PROCEDURE — 78452 HT MUSCLE IMAGE SPECT MULT: CPT

## 2019-03-07 PROCEDURE — 78452 HT MUSCLE IMAGE SPECT MULT: CPT | Performed by: INTERNAL MEDICINE

## 2019-03-07 RX ADMIN — TECHNETIUM TC 99M SESTAMIBI 1 DOSE: 1 INJECTION INTRAVENOUS at 09:53

## 2019-03-07 RX ADMIN — TECHNETIUM TC 99M SESTAMIBI 1 DOSE: 1 INJECTION INTRAVENOUS at 07:44

## 2019-03-07 RX ADMIN — REGADENOSON 0.4 MG: 0.08 INJECTION, SOLUTION INTRAVENOUS at 09:53

## 2019-03-19 ENCOUNTER — OFFICE VISIT (OUTPATIENT)
Dept: CARDIOLOGY | Facility: CLINIC | Age: 59
End: 2019-03-19

## 2019-03-19 VITALS
DIASTOLIC BLOOD PRESSURE: 77 MMHG | HEIGHT: 59 IN | WEIGHT: 107 LBS | BODY MASS INDEX: 21.57 KG/M2 | OXYGEN SATURATION: 100 % | SYSTOLIC BLOOD PRESSURE: 122 MMHG | HEART RATE: 71 BPM

## 2019-03-19 DIAGNOSIS — E78.5 DYSLIPIDEMIA: ICD-10-CM

## 2019-03-19 DIAGNOSIS — R07.2 PRECORDIAL PAIN: ICD-10-CM

## 2019-03-19 DIAGNOSIS — I10 ESSENTIAL HYPERTENSION: Primary | ICD-10-CM

## 2019-03-19 PROCEDURE — 99214 OFFICE O/P EST MOD 30 MIN: CPT | Performed by: PHYSICIAN ASSISTANT

## 2019-03-19 NOTE — PROGRESS NOTES
Deonna Lucio APRN  Grecia Galicia  1960 03/19/2019    Patient Active Problem List   Diagnosis   • Essential hypertension   • Chest pain   • Pneumonia of both lower lobes due to infectious organism (CMS/HCC)   • Dyslipidemia   • Tobacco abuse       Dear Deonna Lucio APRN:    Subjective     History of Present Illness:    Chief Complaint   Patient presents with   • Results     stress test   • Hypertension       Grecia Galicia is a pleasant 58 y.o. female with a past medical history significant for myocardial infarction with subsequent stenting, complete details unavailable, peripheral arterial disease status post intervention to the lower extremities, complete details unavailable, hypertension, dyslipidemia, and tobacco abuse.  She presents to the office today for routine follow-up    Patient states that she has been having some chest pains that occur almost every day.  She states this pain is sharp and stabbing and is in the right upper area of her chest she states that only lasts for seconds and is not associated to exertion.  She does report that she has associated symptoms of getting really hot as well as cold and clammy. Her stress test done last week was normal showing no signs of blockages. She states that she has been stressed still with 5 grand children living with her.     Stress test on 3/7/2019  Interpretation Summary   · Findings consistent with a normal ECG stress test.  · Myocardial perfusion imaging indicates a normal myocardial perfusion study with no evidence of ischemia.  · Gated scans were not completed.  · Impressions are consistent with a low risk study.           Allergies   Allergen Reactions   • No Known Drug Allergy    :      Current Outpatient Medications:   •  albuterol (PROVENTIL HFA;VENTOLIN HFA) 108 (90 BASE) MCG/ACT inhaler, Inhale 2 puffs every 4 (four) hours as needed for wheezing., Disp: , Rfl:   •  amLODIPine (NORVASC) 5 MG tablet, Take 5 mg by mouth  Daily., Disp: , Rfl:   •  aspirin 81 MG tablet, Take 81 mg by mouth Every Night., Disp: , Rfl:   •  atorvastatin (LIPITOR) 40 MG tablet, Take 1 tablet by mouth Every Night., Disp: 30 tablet, Rfl: 5  •  budesonide-formoterol (SYMBICORT) 160-4.5 MCG/ACT inhaler, Inhale 2 puffs 2 (Two) Times a Day., Disp: , Rfl:   •  carvedilol (COREG) 25 MG tablet, Take 1 tablet by mouth 2 (Two) Times a Day With Meals., Disp: 60 tablet, Rfl: 5  •  clopidogrel (PLAVIX) 75 MG tablet, Take 1 tablet by mouth Daily., Disp: 30 tablet, Rfl: 5  •  guaifenesin-dextromethorphan (ROBITUSSIN DM) 100-10 MG/5ML syrup, Take 5 mL by mouth Every 4 (Four) Hours As Needed for Cough., Disp: 236 mL, Rfl: 0  •  loratadine (CLARITIN) 10 MG tablet, Take 10 mg by mouth Every Night., Disp: , Rfl:   •  nitroglycerin (NITROSTAT) 0.4 MG SL tablet, Place 1 tablet under the tongue Every 5 (Five) Minutes As Needed for Chest Pain for up to 25 doses. 1tab q 5mins X3., Disp: 25 tablet, Rfl: 1  •  sulfamethoxazole-trimethoprim (BACTRIM DS,SEPTRA DS) 800-160 MG per tablet, 2 tabs PO BID, Disp: 40 tablet, Rfl: 0    The following portions of the patient's history were reviewed and updated as appropriate: allergies, current medications, past family history, past medical history, past social history, past surgical history and problem list.    Social History     Tobacco Use   • Smoking status: Current Every Day Smoker     Packs/day: 1.00     Years: 40.00     Pack years: 40.00     Types: Cigarettes   • Smokeless tobacco: Never Used   Substance Use Topics   • Alcohol use: No   • Drug use: No       Review of Systems   Constitution: Negative for weakness and malaise/fatigue.   Cardiovascular: Positive for chest pain. Negative for dyspnea on exertion, irregular heartbeat, leg swelling and palpitations.   Respiratory: Negative for cough and shortness of breath.    Hematologic/Lymphatic: Negative for bleeding problem. Does not bruise/bleed easily.   Gastrointestinal: Negative for  "nausea and vomiting.       Objective   Vitals:    03/19/19 1127   BP: 122/77   BP Location: Left arm   Patient Position: Sitting   Cuff Size: Adult   Pulse: 71   SpO2: 100%   Weight: 48.5 kg (107 lb)   Height: 149.9 cm (59.02\")     Body mass index is 21.6 kg/m².    Physical Exam   Constitutional: She is oriented to person, place, and time. She appears well-developed and well-nourished. No distress.   HENT:   Head: Normocephalic and atraumatic.   Cardiovascular: Normal rate, regular rhythm, normal heart sounds and intact distal pulses.   Pulmonary/Chest: Effort normal and breath sounds normal. No respiratory distress.   Decreased breath sounds bilaterally.    Musculoskeletal: She exhibits no edema.   Neurological: She is alert and oriented to person, place, and time.   Skin: She is not diaphoretic.       Lab Results   Component Value Date     01/04/2019    K 3.8 01/04/2019     01/04/2019    CO2 26.3 01/04/2019    BUN 14 01/04/2019    CREATININE 0.70 01/04/2019    GLUCOSE 82 01/04/2019    CALCIUM 9.3 01/04/2019    AST 18 01/04/2019    ALT 18 01/04/2019    ALKPHOS 161 (H) 01/04/2019     No results found for: CKTOTAL  Lab Results   Component Value Date    WBC 6.47 01/04/2019    HGB 14.2 01/04/2019    HCT 43.0 01/04/2019     01/04/2019     Lab Results   Component Value Date    INR 1.00 12/14/2014     Lab Results   Component Value Date    MG 2.2 11/03/2017     Lab Results   Component Value Date    TSH 2.263 01/04/2019    TRIG 111 01/04/2019    HDL 50 (L) 01/04/2019    LDL 59 01/04/2019      Lab Results   Component Value Date    BNP 22.0 11/01/2017       During this visit the following were done:  Labs Reviewed [x]    Labs Ordered []    Radiology Reports Reviewed [x]    Radiology Ordered []    PCP Records Reviewed []    Referring Provider Records Reviewed []    ER Records Reviewed []    Hospital Records Reviewed []    History Obtained From Family []    Radiology Images Reviewed []    Other Reviewed []  "   Records Requested []       Procedures    Assessment/Plan    Diagnosis Plan   1. Essential hypertension     2. Precordial pain  Holter Monitor - 48 Hour   3. Dyslipidemia              Recommendations:  1. Will evaulate patient's chest pains with a 48 hour holter monitor.  2. Discussed the results of her stress test with her.   3. Also discussed with her stopping tobacco abuse but does not wish to stop at this time.   4. Continue amlodipine, aspirin, carvedilol, plavix.    Patient's Body mass index is 21.6 kg/m². BMI is within normal parameters. No follow-up required..     Return in about 3 months (around 6/19/2019).    As always, I appreciate very much the opportunity to participate in the cardiovascular care of your patients.    With Best Regards,    Gavino Casarez PA-C

## 2019-03-23 ENCOUNTER — HOSPITAL ENCOUNTER (OUTPATIENT)
Dept: RESPIRATORY THERAPY | Facility: HOSPITAL | Age: 59
Discharge: HOME | End: 2019-03-23
Admitting: PHYSICIAN ASSISTANT

## 2019-03-23 DIAGNOSIS — R07.2 PRECORDIAL PAIN: ICD-10-CM

## 2019-03-23 PROCEDURE — 93226 XTRNL ECG REC<48 HR SCAN A/R: CPT

## 2019-03-23 PROCEDURE — 93225 XTRNL ECG REC<48 HRS REC: CPT

## 2019-03-27 PROCEDURE — 93227 XTRNL ECG REC<48 HR R&I: CPT | Performed by: INTERNAL MEDICINE

## 2019-06-25 ENCOUNTER — OFFICE VISIT (OUTPATIENT)
Dept: CARDIOLOGY | Facility: CLINIC | Age: 59
End: 2019-06-25

## 2019-06-25 VITALS
HEART RATE: 65 BPM | WEIGHT: 105 LBS | OXYGEN SATURATION: 99 % | SYSTOLIC BLOOD PRESSURE: 119 MMHG | HEIGHT: 59 IN | DIASTOLIC BLOOD PRESSURE: 73 MMHG | BODY MASS INDEX: 21.17 KG/M2

## 2019-06-25 DIAGNOSIS — E78.5 DYSLIPIDEMIA: ICD-10-CM

## 2019-06-25 DIAGNOSIS — I49.3 PVC'S (PREMATURE VENTRICULAR CONTRACTIONS): ICD-10-CM

## 2019-06-25 DIAGNOSIS — Z72.0 TOBACCO ABUSE: ICD-10-CM

## 2019-06-25 DIAGNOSIS — I10 ESSENTIAL HYPERTENSION: Primary | ICD-10-CM

## 2019-06-25 PROCEDURE — 99213 OFFICE O/P EST LOW 20 MIN: CPT | Performed by: PHYSICIAN ASSISTANT

## 2019-06-25 NOTE — PROGRESS NOTES
Deonna Lucio APRN  Grecia Galicia  1960 06/25/2019    Patient Active Problem List   Diagnosis   • Essential hypertension   • Chest pain   • Pneumonia of both lower lobes due to infectious organism (CMS/HCC)   • Dyslipidemia   • Tobacco abuse   • PVC's (premature ventricular contractions)       Dear Deonna Lucio APRN:    Subjective     History of Present Illness:    Chief Complaint   Patient presents with   • Chest Pain     follow up. Holter monitor result review.   • Med Management     verbal. pt reports no med changes.       Grecia Galicia is a pleasant 58 y.o. female with a past medical history significant for Grecia Galicia is a pleasant 58 y.o. female with a past medical history significant for myocardial infarction with subsequent stenting, complete details unavailable, peripheral arterial disease status post intervention to the lower extremities, complete details unavailable, hypertension, dyslipidemia, and tobacco abuse.  She presents to the office today for routine follow-up    Patient had a 48 hour holter monitor that revealed a predominant  normal sinus rhythm with rare PACs and PVCs. Patient reports that she does still have some chest pain that occur once or twice a week. She describes these pains as a stabbing pain that only lasts for a second or two and resolves before she is able to do anything to help this pain. She also reports that this pain comes on at rest and denies any relation to exertion at all.  She does deny worsening shortness of breath from baseline or associated symptoms.  She also denies dizziness or syncope.    Allergies   Allergen Reactions   • No Known Drug Allergy    :      Current Outpatient Medications:   •  albuterol (PROVENTIL HFA;VENTOLIN HFA) 108 (90 BASE) MCG/ACT inhaler, Inhale 2 puffs every 4 (four) hours as needed for wheezing., Disp: , Rfl:   •  amLODIPine (NORVASC) 5 MG tablet, Take 5 mg by mouth Daily., Disp: , Rfl:   •  aspirin 81 MG  tablet, Take 81 mg by mouth Every Night., Disp: , Rfl:   •  atorvastatin (LIPITOR) 40 MG tablet, Take 1 tablet by mouth Every Night., Disp: 30 tablet, Rfl: 5  •  budesonide-formoterol (SYMBICORT) 160-4.5 MCG/ACT inhaler, Inhale 2 puffs 2 (Two) Times a Day., Disp: , Rfl:   •  carvedilol (COREG) 25 MG tablet, Take 1 tablet by mouth 2 (Two) Times a Day With Meals., Disp: 60 tablet, Rfl: 5  •  clopidogrel (PLAVIX) 75 MG tablet, Take 1 tablet by mouth Daily., Disp: 30 tablet, Rfl: 5  •  nitroglycerin (NITROSTAT) 0.4 MG SL tablet, Place 1 tablet under the tongue Every 5 (Five) Minutes As Needed for Chest Pain for up to 25 doses. 1tab q 5mins X3., Disp: 25 tablet, Rfl: 1  •  guaifenesin-dextromethorphan (ROBITUSSIN DM) 100-10 MG/5ML syrup, Take 5 mL by mouth Every 4 (Four) Hours As Needed for Cough., Disp: 236 mL, Rfl: 0  •  loratadine (CLARITIN) 10 MG tablet, Take 10 mg by mouth Every Night., Disp: , Rfl:   •  sulfamethoxazole-trimethoprim (BACTRIM DS,SEPTRA DS) 800-160 MG per tablet, 2 tabs PO BID, Disp: 40 tablet, Rfl: 0    The following portions of the patient's history were reviewed and updated as appropriate: allergies, current medications, past family history, past medical history, past social history, past surgical history and problem list.    Social History     Tobacco Use   • Smoking status: Current Every Day Smoker     Packs/day: 1.00     Years: 40.00     Pack years: 40.00     Types: Cigarettes   • Smokeless tobacco: Never Used   Substance Use Topics   • Alcohol use: No   • Drug use: No       Review of Systems   Constitution: Negative for weakness and malaise/fatigue.   Cardiovascular: Positive for chest pain. Negative for dyspnea on exertion and irregular heartbeat.   Respiratory: Negative for cough and shortness of breath.    Hematologic/Lymphatic: Negative for bleeding problem. Does not bruise/bleed easily.   Gastrointestinal: Negative for nausea and vomiting.       Objective   Vitals:    06/25/19 1247   BP:  "119/73   BP Location: Right arm   Patient Position: Sitting   Cuff Size: Adult   Pulse: 65   SpO2: 99%   Weight: 47.6 kg (105 lb)   Height: 149.9 cm (59.02\")     Body mass index is 21.19 kg/m².    Physical Exam   Constitutional: She is oriented to person, place, and time. She appears well-developed and well-nourished. No distress.   HENT:   Head: Normocephalic and atraumatic.   Cardiovascular: Normal rate, regular rhythm and normal heart sounds.   Pulmonary/Chest: Effort normal. No respiratory distress. She has wheezes.   Decreased lung sounds bilaterally.    Musculoskeletal: She exhibits no edema.   Neurological: She is alert and oriented to person, place, and time.   Skin: She is not diaphoretic.       Lab Results   Component Value Date     01/04/2019    K 3.8 01/04/2019     01/04/2019    CO2 26.3 01/04/2019    BUN 14 01/04/2019    CREATININE 0.70 01/04/2019    GLUCOSE 82 01/04/2019    CALCIUM 9.3 01/04/2019    AST 18 01/04/2019    ALT 18 01/04/2019    ALKPHOS 161 (H) 01/04/2019     No results found for: CKTOTAL  Lab Results   Component Value Date    WBC 6.47 01/04/2019    HGB 14.2 01/04/2019    HCT 43.0 01/04/2019     01/04/2019     Lab Results   Component Value Date    INR 1.00 12/14/2014     Lab Results   Component Value Date    MG 2.2 11/03/2017     Lab Results   Component Value Date    TSH 2.263 01/04/2019    TRIG 111 01/04/2019    HDL 50 (L) 01/04/2019    LDL 59 01/04/2019      Lab Results   Component Value Date    BNP 22.0 11/01/2017       During this visit the following were done:  Labs Reviewed [x]    Labs Ordered []    Radiology Reports Reviewed [x]    Radiology Ordered []    PCP Records Reviewed []    Referring Provider Records Reviewed []    ER Records Reviewed []    Hospital Records Reviewed []    History Obtained From Family []    Radiology Images Reviewed []    Other Reviewed []    Records Requested []       Procedures    Assessment/Plan    Diagnosis Plan   1. Essential " hypertension     2. Dyslipidemia     3. Tobacco abuse     4. PVC's (premature ventricular contractions)          Recommendations:  1. I discussed results of Holter monitor with patient.  2. At this time it appears that her chest pains are atypical lasting only seconds and not related to exertion.  It seems more likely related to her PACs and PVCs.  I did advise for her to try to cut back and coffee and other caffeine products.  I also strongly encouraged her to quit smoking tobacco she reported that she is trying and is down from 2 packs a day to 1 pack a day.  3. Continue carvedilol, atorvastatin, aspirin, amlodipine.     Patient's Body mass index is 21.19 kg/m². BMI is within normal parameters. No follow-up required..     Return in about 3 months (around 9/25/2019).    As always, I appreciate very much the opportunity to participate in the cardiovascular care of your patients.      With Best Regards,    Gavino Casarez PA-C

## 2019-10-08 ENCOUNTER — OFFICE VISIT (OUTPATIENT)
Dept: CARDIOLOGY | Facility: CLINIC | Age: 59
End: 2019-10-08

## 2019-10-08 VITALS
DIASTOLIC BLOOD PRESSURE: 73 MMHG | HEART RATE: 81 BPM | WEIGHT: 117.3 LBS | RESPIRATION RATE: 14 BRPM | SYSTOLIC BLOOD PRESSURE: 119 MMHG | HEIGHT: 59 IN | BODY MASS INDEX: 23.65 KG/M2

## 2019-10-08 DIAGNOSIS — R07.2 PRECORDIAL PAIN: Primary | ICD-10-CM

## 2019-10-08 DIAGNOSIS — I49.3 PVC'S (PREMATURE VENTRICULAR CONTRACTIONS): ICD-10-CM

## 2019-10-08 DIAGNOSIS — I10 ESSENTIAL HYPERTENSION: ICD-10-CM

## 2019-10-08 PROCEDURE — 99213 OFFICE O/P EST LOW 20 MIN: CPT | Performed by: PHYSICIAN ASSISTANT

## 2019-10-08 NOTE — PROGRESS NOTES
Deonna Lucio APRN  Grecia Galicia  1960  10/08/2019    Patient Active Problem List   Diagnosis   • Essential hypertension   • Chest pain   • Pneumonia of both lower lobes due to infectious organism (CMS/HCC)   • Dyslipidemia   • Tobacco abuse   • PVC's (premature ventricular contractions)       Dear Deonna Lucio APRN:    Subjective     History of Present Illness:    Chief Complaint   Patient presents with   • Hypertension       Grecia Galicia is a pleasant 58 y.o. female with a past medical history significant for acute myocardial infarction with subsequent stenting, complete details unavailable but was around 2005, peripheral arterial disease status post intervention to the lower extremities, complete details unavailable, hypertension, dyslipidemia, and tobacco abuse.  She presents to the office today for routine follow-up    Patient reports that she has been doing well. She denies any chest pains, shortness of breath, dizziness, palpitations, syncope, or near syncope. She is complaining of a tooth ache from a tooth infection currently going on.     Allergies   Allergen Reactions   • No Known Drug Allergy        Current Outpatient Medications:   •  albuterol (PROVENTIL HFA;VENTOLIN HFA) 108 (90 BASE) MCG/ACT inhaler, Inhale 2 puffs every 4 (four) hours as needed for wheezing., Disp: , Rfl:   •  aspirin 81 MG tablet, Take 81 mg by mouth Every Night., Disp: , Rfl:   •  atorvastatin (LIPITOR) 40 MG tablet, Take 1 tablet by mouth Every Night., Disp: 30 tablet, Rfl: 5  •  carvedilol (COREG) 25 MG tablet, Take 1 tablet by mouth 2 (Two) Times a Day With Meals., Disp: 60 tablet, Rfl: 5  •  clopidogrel (PLAVIX) 75 MG tablet, Take 1 tablet by mouth Daily., Disp: 30 tablet, Rfl: 5  •  fluticasone-salmeterol (ADVAIR DISKUS) 250-50 MCG/DOSE DISKUS, Inhale 1 puff 2 (Two) Times a Day., Disp: , Rfl:   •  loratadine (CLARITIN) 10 MG tablet, Take 10 mg by mouth Every Night., Disp: , Rfl:   •  nitroglycerin  "(NITROSTAT) 0.4 MG SL tablet, Place 1 tablet under the tongue Every 5 (Five) Minutes As Needed for Chest Pain for up to 25 doses. 1tab q 5mins X3., Disp: 25 tablet, Rfl: 1  •  amLODIPine (NORVASC) 5 MG tablet, Take 5 mg by mouth Daily., Disp: , Rfl:   •  budesonide-formoterol (SYMBICORT) 160-4.5 MCG/ACT inhaler, Inhale 2 puffs 2 (Two) Times a Day., Disp: , Rfl:   •  guaifenesin-dextromethorphan (ROBITUSSIN DM) 100-10 MG/5ML syrup, Take 5 mL by mouth Every 4 (Four) Hours As Needed for Cough., Disp: 236 mL, Rfl: 0  •  sulfamethoxazole-trimethoprim (BACTRIM DS,SEPTRA DS) 800-160 MG per tablet, 2 tabs PO BID, Disp: 40 tablet, Rfl: 0    The following portions of the patient's history were reviewed and updated as appropriate: allergies, current medications, past family history, past medical history, past social history, past surgical history and problem list.    Social History     Tobacco Use   • Smoking status: Current Every Day Smoker     Packs/day: 1.00     Years: 40.00     Pack years: 40.00     Types: Cigarettes   • Smokeless tobacco: Never Used   Substance Use Topics   • Alcohol use: No   • Drug use: No     Review of Systems   Constitution: Negative for weakness and malaise/fatigue.   HENT: Positive for odynophagia.    Cardiovascular: Negative for chest pain, dyspnea on exertion and irregular heartbeat.   Respiratory: Negative for cough and shortness of breath.    Hematologic/Lymphatic: Negative for bleeding problem. Does not bruise/bleed easily.   Gastrointestinal: Negative for nausea and vomiting.     Objective   Vitals:    10/08/19 1108   BP: 119/73   Pulse: 81   Resp: 14   Weight: 53.2 kg (117 lb 4.8 oz)   Height: 149.9 cm (59\")     Body mass index is 23.69 kg/m².    Physical Exam   Constitutional: She is oriented to person, place, and time. She appears well-developed and well-nourished. No distress.   HENT:   Head: Normocephalic and atraumatic.   Cardiovascular: Normal rate, regular rhythm and normal heart " sounds.   Pulmonary/Chest: Effort normal and breath sounds normal. No respiratory distress.   Musculoskeletal: She exhibits no edema.   Neurological: She is alert and oriented to person, place, and time.   Skin: She is not diaphoretic.     Lab Results   Component Value Date     01/04/2019    K 3.8 01/04/2019     01/04/2019    CO2 26.3 01/04/2019    BUN 14 01/04/2019    CREATININE 0.70 01/04/2019    GLUCOSE 82 01/04/2019    CALCIUM 9.3 01/04/2019    AST 18 01/04/2019    ALT 18 01/04/2019    ALKPHOS 161 (H) 01/04/2019     No results found for: CKTOTAL  Lab Results   Component Value Date    WBC 6.47 01/04/2019    HGB 14.2 01/04/2019    HCT 43.0 01/04/2019     01/04/2019     Lab Results   Component Value Date    INR 1.00 12/14/2014     Lab Results   Component Value Date    MG 2.2 11/03/2017     Lab Results   Component Value Date    TSH 2.263 01/04/2019    TRIG 111 01/04/2019    HDL 50 (L) 01/04/2019    LDL 59 01/04/2019      Lab Results   Component Value Date    BNP 22.0 11/01/2017       During this visit the following were done:  Labs Reviewed [x]    Labs Ordered []    Radiology Reports Reviewed [x]    Radiology Ordered []    PCP Records Reviewed []    Referring Provider Records Reviewed []    ER Records Reviewed []    Hospital Records Reviewed []    History Obtained From Family []    Radiology Images Reviewed []    Other Reviewed []    Records Requested []       Procedures    Assessment/Plan    Diagnosis Plan   1. Precordial pain     2. PVC's (premature ventricular contractions)     3. Essential hypertension          Recommendations:  1. Patient appears to be stable from cardiac standpoint. She is no longer having significant palpations and she denies any chest pains with good blood pressure control. I did encourage her to see her PCP regarding her tooth infection. Otherwise, continue plavix, coreg, aspirin, lipitor, and amlodipine.     Patient's Body mass index is 23.69 kg/m². BMI is within normal  parameters. No follow-up required..       Return in about 6 months (around 4/8/2020).    As always, I appreciate very much the opportunity to participate in the cardiovascular care of your patients.      With Best Regards,    Gavino Casarez PA-C

## 2020-06-17 ENCOUNTER — OFFICE VISIT (OUTPATIENT)
Dept: CARDIOLOGY | Facility: CLINIC | Age: 60
End: 2020-06-17

## 2020-06-17 VITALS
TEMPERATURE: 98.4 F | DIASTOLIC BLOOD PRESSURE: 91 MMHG | SYSTOLIC BLOOD PRESSURE: 148 MMHG | WEIGHT: 134.8 LBS | HEART RATE: 94 BPM | OXYGEN SATURATION: 98 % | BODY MASS INDEX: 27.17 KG/M2 | HEIGHT: 59 IN | RESPIRATION RATE: 16 BRPM

## 2020-06-17 DIAGNOSIS — I10 ESSENTIAL HYPERTENSION: Primary | ICD-10-CM

## 2020-06-17 DIAGNOSIS — E78.5 DYSLIPIDEMIA: ICD-10-CM

## 2020-06-17 PROCEDURE — 93000 ELECTROCARDIOGRAM COMPLETE: CPT | Performed by: PHYSICIAN ASSISTANT

## 2020-06-17 PROCEDURE — 99213 OFFICE O/P EST LOW 20 MIN: CPT | Performed by: PHYSICIAN ASSISTANT

## 2020-06-17 RX ORDER — CEFDINIR 300 MG/1
300 CAPSULE ORAL 2 TIMES DAILY
COMMUNITY
End: 2021-03-17

## 2020-06-17 RX ORDER — CARVEDILOL 25 MG/1
25 TABLET ORAL 2 TIMES DAILY WITH MEALS
Qty: 60 TABLET | Refills: 5 | Status: SHIPPED | OUTPATIENT
Start: 2020-06-17 | End: 2021-03-17 | Stop reason: SDUPTHER

## 2020-06-17 RX ORDER — ATORVASTATIN CALCIUM 40 MG/1
40 TABLET, FILM COATED ORAL NIGHTLY
Qty: 30 TABLET | Refills: 5 | Status: SHIPPED | OUTPATIENT
Start: 2020-06-17 | End: 2021-03-17 | Stop reason: SDUPTHER

## 2020-06-17 RX ORDER — AMLODIPINE BESYLATE 5 MG/1
5 TABLET ORAL DAILY
Qty: 90 TABLET | Refills: 2 | Status: SHIPPED | OUTPATIENT
Start: 2020-06-17 | End: 2021-03-17 | Stop reason: SDUPTHER

## 2020-06-17 RX ORDER — CLOPIDOGREL BISULFATE 75 MG/1
75 TABLET ORAL DAILY
Qty: 30 TABLET | Refills: 5 | Status: SHIPPED | OUTPATIENT
Start: 2020-06-17 | End: 2021-03-17 | Stop reason: SDUPTHER

## 2020-06-17 NOTE — PROGRESS NOTES
Deonna Lucio APRN  Grecia Galicia  1960 06/17/2020    Patient Active Problem List   Diagnosis   • Essential hypertension   • Chest pain   • Pneumonia of both lower lobes due to infectious organism   • Dyslipidemia   • Tobacco abuse   • PVC's (premature ventricular contractions)       Dear Deonna Lucio APRN:    Subjective     History of Present Illness:    Chief Complaint   Patient presents with   • Hypertension     8 mos follow   • Shortness of Breath     routine activity   • Med Management     verbal       Grecia Galicia is a pleasant 59 y.o. female with a past medical history significant for acute myocardial infarction with subsequent stenting, complete details unavailable but was around 2005, peripheral arterial disease status post intervention to the lower extremities, complete details unavailable, hypertension, dyslipidemia, and tobacco abuse.  She presents to the office today for routine follow-up    Overall  reports that she has been doing well.  She does report she is short of breath recently but was recently diagnosed with an upper respiratory illness and is being treated by her PCP with Bactrim.  She does deny any orthopnea, PND, or pedal edema.  She also denies any chest pains, palpitations, or syncope.    Allergies   Allergen Reactions   • No Known Drug Allergy    :      Current Outpatient Medications:   •  albuterol (PROVENTIL HFA;VENTOLIN HFA) 108 (90 BASE) MCG/ACT inhaler, Inhale 2 puffs every 4 (four) hours as needed for wheezing., Disp: , Rfl:   •  amLODIPine (NORVASC) 5 MG tablet, Take 1 tablet by mouth Daily., Disp: 90 tablet, Rfl: 2  •  aspirin 81 MG tablet, Take 81 mg by mouth Every Night., Disp: , Rfl:   •  atorvastatin (LIPITOR) 40 MG tablet, Take 1 tablet by mouth Every Night., Disp: 30 tablet, Rfl: 5  •  budesonide-formoterol (SYMBICORT) 160-4.5 MCG/ACT inhaler, Inhale 2 puffs 2 (Two) Times a Day., Disp: , Rfl:   •  carvedilol (COREG) 25 MG tablet,  Take 1 tablet by mouth 2 (Two) Times a Day With Meals., Disp: 60 tablet, Rfl: 5  •  cefdinir (OMNICEF) 300 MG capsule, Take 300 mg by mouth 2 (Two) Times a Day., Disp: , Rfl:   •  clopidogrel (PLAVIX) 75 MG tablet, Take 1 tablet by mouth Daily., Disp: 30 tablet, Rfl: 5  •  fluticasone-salmeterol (ADVAIR DISKUS) 250-50 MCG/DOSE DISKUS, Inhale 1 puff 2 (Two) Times a Day., Disp: , Rfl:   •  guaifenesin-dextromethorphan (ROBITUSSIN DM) 100-10 MG/5ML syrup, Take 5 mL by mouth Every 4 (Four) Hours As Needed for Cough., Disp: 236 mL, Rfl: 0  •  loratadine (CLARITIN) 10 MG tablet, Take 10 mg by mouth Every Night., Disp: , Rfl:   •  nitroglycerin (NITROSTAT) 0.4 MG SL tablet, Place 1 tablet under the tongue Every 5 (Five) Minutes As Needed for Chest Pain for up to 25 doses. 1tab q 5mins X3., Disp: 25 tablet, Rfl: 1  •  sulfamethoxazole-trimethoprim (BACTRIM DS,SEPTRA DS) 800-160 MG per tablet, 2 tabs PO BID, Disp: 40 tablet, Rfl: 0    The following portions of the patient's history were reviewed and updated as appropriate: allergies, current medications, past family history, past medical history, past social history, past surgical history and problem list.    Social History     Tobacco Use   • Smoking status: Current Every Day Smoker     Packs/day: 1.00     Years: 40.00     Pack years: 40.00     Types: Cigarettes   • Smokeless tobacco: Never Used   Substance Use Topics   • Alcohol use: No   • Drug use: No       Review of Systems   Constitution: Negative for malaise/fatigue.   Cardiovascular: Negative for chest pain, dyspnea on exertion, irregular heartbeat, leg swelling and palpitations.   Respiratory: Positive for shortness of breath. Negative for cough.    Hematologic/Lymphatic: Negative for bleeding problem. Does not bruise/bleed easily.   Gastrointestinal: Negative for nausea and vomiting.   Neurological: Negative for weakness.       Objective   Vitals:    06/17/20 0953   BP: 148/91   Pulse: 94   Resp: 16   Temp: 98.4 °F  "(36.9 °C)   SpO2: 98%   Weight: 61.1 kg (134 lb 12.8 oz)   Height: 149.9 cm (59\")     Body mass index is 27.23 kg/m².    Physical Exam   Constitutional: She is oriented to person, place, and time. She appears well-developed and well-nourished. No distress.   HENT:   Head: Normocephalic and atraumatic.   Cardiovascular: Normal rate, regular rhythm and normal heart sounds.   Pulmonary/Chest: Effort normal and breath sounds normal. No respiratory distress.   Musculoskeletal: She exhibits no edema.   Neurological: She is alert and oriented to person, place, and time.   Skin: She is not diaphoretic.       Lab Results   Component Value Date     01/04/2019    K 3.8 01/04/2019     01/04/2019    CO2 26.3 01/04/2019    BUN 14 01/04/2019    CREATININE 0.70 01/04/2019    GLUCOSE 82 01/04/2019    CALCIUM 9.3 01/04/2019    AST 18 01/04/2019    ALT 18 01/04/2019    ALKPHOS 161 (H) 01/04/2019     No results found for: CKTOTAL  Lab Results   Component Value Date    WBC 6.47 01/04/2019    HGB 14.2 01/04/2019    HCT 43.0 01/04/2019     01/04/2019     Lab Results   Component Value Date    INR 1.00 12/14/2014     Lab Results   Component Value Date    MG 2.2 11/03/2017     Lab Results   Component Value Date    TSH 2.263 01/04/2019    TRIG 111 01/04/2019    HDL 50 (L) 01/04/2019    LDL 59 01/04/2019      Lab Results   Component Value Date    BNP 22.0 11/01/2017       During this visit the following were done:  Labs Reviewed [x]    Labs Ordered []    Radiology Reports Reviewed [x]    Radiology Ordered []    PCP Records Reviewed []    Referring Provider Records Reviewed []    ER Records Reviewed []    Hospital Records Reviewed []    History Obtained From Family []    Radiology Images Reviewed []    Other Reviewed []    Records Requested []         ECG 12 Lead  Date/Time: 6/17/2020 9:55 AM  Performed by: Gavino Casarez PA-C  Authorized by: Gavino Casarez PA-C   Comparison: compared with previous ECG   Similar to " previous ECG  Rhythm: sinus rhythm  Conduction: conduction normal    Clinical impression: normal ECG            Assessment/Plan    Diagnosis Plan   1. Essential hypertension  Lipid Panel    Comprehensive Metabolic Panel   2. Dyslipidemia              Recommendations:  1. Overall Mrs. Galicia is stable from cardiac standpoint I will check a lipid panel and CMP since this not been done recently.  Otherwise continue Plavix, Coreg, Lipitor, amlodipine, and aspirin.    Return in about 6 months (around 12/17/2020).    As always, I appreciate very much the opportunity to participate in the cardiovascular care of your patients.      With Best Regards,    Gavino Casarez PA-C

## 2021-03-10 ENCOUNTER — LAB (OUTPATIENT)
Dept: LAB | Facility: HOSPITAL | Age: 61
End: 2021-03-10

## 2021-03-10 ENCOUNTER — TRANSCRIBE ORDERS (OUTPATIENT)
Dept: ADMINISTRATIVE | Facility: HOSPITAL | Age: 61
End: 2021-03-10

## 2021-03-10 DIAGNOSIS — R68.89 MECHANICAL AND MOTOR PROBLEMS WITH INTERNAL ORGANS: ICD-10-CM

## 2021-03-10 DIAGNOSIS — R79.89 HYPOURICEMIA: ICD-10-CM

## 2021-03-10 DIAGNOSIS — I10 ESSENTIAL HYPERTENSION, MALIGNANT: ICD-10-CM

## 2021-03-10 DIAGNOSIS — E78.5 HYPERLIPIDEMIA, UNSPECIFIED HYPERLIPIDEMIA TYPE: Primary | ICD-10-CM

## 2021-03-10 DIAGNOSIS — I10 ESSENTIAL HYPERTENSION: ICD-10-CM

## 2021-03-10 DIAGNOSIS — E78.5 HYPERLIPIDEMIA, UNSPECIFIED HYPERLIPIDEMIA TYPE: ICD-10-CM

## 2021-03-10 LAB
25(OH)D3 SERPL-MCNC: 11.6 NG/ML
ALBUMIN SERPL-MCNC: 4.6 G/DL (ref 3.5–5.2)
ALBUMIN/GLOB SERPL: 1.2 G/DL
ALP SERPL-CCNC: 203 U/L (ref 39–117)
ALT SERPL W P-5'-P-CCNC: 22 U/L (ref 1–33)
ANION GAP SERPL CALCULATED.3IONS-SCNC: 11 MMOL/L (ref 5–15)
AST SERPL-CCNC: 20 U/L (ref 1–32)
BASOPHILS # BLD AUTO: 0.03 10*3/MM3 (ref 0–0.2)
BASOPHILS NFR BLD AUTO: 0.3 % (ref 0–1.5)
BILIRUB SERPL-MCNC: 0.3 MG/DL (ref 0–1.2)
BUN SERPL-MCNC: 18 MG/DL (ref 6–20)
BUN/CREAT SERPL: 29 (ref 7–25)
CALCIUM SPEC-SCNC: 10.3 MG/DL (ref 8.6–10.5)
CHLORIDE SERPL-SCNC: 100 MMOL/L (ref 98–107)
CHOLEST SERPL-MCNC: 139 MG/DL (ref 0–200)
CO2 SERPL-SCNC: 28 MMOL/L (ref 22–29)
CREAT SERPL-MCNC: 0.62 MG/DL (ref 0.57–1)
DEPRECATED RDW RBC AUTO: 42 FL (ref 37–54)
EOSINOPHIL # BLD AUTO: 0.01 10*3/MM3 (ref 0–0.4)
EOSINOPHIL NFR BLD AUTO: 0.1 % (ref 0.3–6.2)
ERYTHROCYTE [DISTWIDTH] IN BLOOD BY AUTOMATED COUNT: 12.5 % (ref 12.3–15.4)
GFR SERPL CREATININE-BSD FRML MDRD: 99 ML/MIN/1.73
GLOBULIN UR ELPH-MCNC: 3.8 GM/DL
GLUCOSE SERPL-MCNC: 111 MG/DL (ref 65–99)
HBA1C MFR BLD: 6.01 % (ref 4.8–5.6)
HCT VFR BLD AUTO: 39.4 % (ref 34–46.6)
HDLC SERPL-MCNC: 48 MG/DL (ref 40–60)
HGB BLD-MCNC: 13.1 G/DL (ref 12–15.9)
IMM GRANULOCYTES # BLD AUTO: 0.05 10*3/MM3 (ref 0–0.05)
IMM GRANULOCYTES NFR BLD AUTO: 0.4 % (ref 0–0.5)
LDLC SERPL CALC-MCNC: 80 MG/DL (ref 0–100)
LDLC/HDLC SERPL: 1.68 {RATIO}
LYMPHOCYTES # BLD AUTO: 1.15 10*3/MM3 (ref 0.7–3.1)
LYMPHOCYTES NFR BLD AUTO: 10.1 % (ref 19.6–45.3)
MCH RBC QN AUTO: 30.7 PG (ref 26.6–33)
MCHC RBC AUTO-ENTMCNC: 33.2 G/DL (ref 31.5–35.7)
MCV RBC AUTO: 92.3 FL (ref 79–97)
MONOCYTES # BLD AUTO: 0.4 10*3/MM3 (ref 0.1–0.9)
MONOCYTES NFR BLD AUTO: 3.5 % (ref 5–12)
NEUTROPHILS NFR BLD AUTO: 85.6 % (ref 42.7–76)
NEUTROPHILS NFR BLD AUTO: 9.8 10*3/MM3 (ref 1.7–7)
NRBC BLD AUTO-RTO: 0 /100 WBC (ref 0–0.2)
PLATELET # BLD AUTO: 343 10*3/MM3 (ref 140–450)
PMV BLD AUTO: 10.7 FL (ref 6–12)
POTASSIUM SERPL-SCNC: 4.6 MMOL/L (ref 3.5–5.2)
PROT SERPL-MCNC: 8.4 G/DL (ref 6–8.5)
RBC # BLD AUTO: 4.27 10*6/MM3 (ref 3.77–5.28)
SODIUM SERPL-SCNC: 139 MMOL/L (ref 136–145)
TRIGL SERPL-MCNC: 51 MG/DL (ref 0–150)
TSH SERPL DL<=0.05 MIU/L-ACNC: 0.9 UIU/ML (ref 0.27–4.2)
VIT B12 BLD-MCNC: 673 PG/ML (ref 211–946)
VLDLC SERPL-MCNC: 11 MG/DL (ref 5–40)
WBC # BLD AUTO: 11.44 10*3/MM3 (ref 3.4–10.8)

## 2021-03-10 PROCEDURE — 82607 VITAMIN B-12: CPT

## 2021-03-10 PROCEDURE — 36415 COLL VENOUS BLD VENIPUNCTURE: CPT

## 2021-03-10 PROCEDURE — 80053 COMPREHEN METABOLIC PANEL: CPT

## 2021-03-10 PROCEDURE — 84443 ASSAY THYROID STIM HORMONE: CPT

## 2021-03-10 PROCEDURE — 83036 HEMOGLOBIN GLYCOSYLATED A1C: CPT

## 2021-03-10 PROCEDURE — 80061 LIPID PANEL: CPT

## 2021-03-10 PROCEDURE — 85025 COMPLETE CBC W/AUTO DIFF WBC: CPT

## 2021-03-10 PROCEDURE — 82306 VITAMIN D 25 HYDROXY: CPT

## 2021-03-17 ENCOUNTER — OFFICE VISIT (OUTPATIENT)
Dept: CARDIOLOGY | Facility: CLINIC | Age: 61
End: 2021-03-17

## 2021-03-17 VITALS
HEIGHT: 59 IN | WEIGHT: 133 LBS | SYSTOLIC BLOOD PRESSURE: 124 MMHG | TEMPERATURE: 98.4 F | HEART RATE: 97 BPM | BODY MASS INDEX: 26.81 KG/M2 | DIASTOLIC BLOOD PRESSURE: 79 MMHG

## 2021-03-17 DIAGNOSIS — I25.10 CORONARY ARTERY DISEASE DUE TO LIPID RICH PLAQUE: Primary | ICD-10-CM

## 2021-03-17 DIAGNOSIS — E78.5 DYSLIPIDEMIA: ICD-10-CM

## 2021-03-17 DIAGNOSIS — I10 ESSENTIAL HYPERTENSION: ICD-10-CM

## 2021-03-17 DIAGNOSIS — I25.83 CORONARY ARTERY DISEASE DUE TO LIPID RICH PLAQUE: Primary | ICD-10-CM

## 2021-03-17 PROBLEM — I25.810 CORONARY ARTERY DISEASE INVOLVING CORONARY BYPASS GRAFT OF NATIVE HEART WITHOUT ANGINA PECTORIS: Status: RESOLVED | Noted: 2021-03-17 | Resolved: 2021-03-17

## 2021-03-17 PROBLEM — R07.9 CHEST PAIN: Status: RESOLVED | Noted: 2017-06-15 | Resolved: 2021-03-17

## 2021-03-17 PROBLEM — I25.810 CORONARY ARTERY DISEASE INVOLVING CORONARY BYPASS GRAFT OF NATIVE HEART WITHOUT ANGINA PECTORIS: Status: ACTIVE | Noted: 2021-03-17

## 2021-03-17 PROBLEM — Z72.0 TOBACCO ABUSE: Status: RESOLVED | Noted: 2019-01-17 | Resolved: 2021-03-17

## 2021-03-17 PROCEDURE — 93000 ELECTROCARDIOGRAM COMPLETE: CPT | Performed by: PHYSICIAN ASSISTANT

## 2021-03-17 PROCEDURE — 99213 OFFICE O/P EST LOW 20 MIN: CPT | Performed by: PHYSICIAN ASSISTANT

## 2021-03-17 RX ORDER — CARVEDILOL 25 MG/1
25 TABLET ORAL 2 TIMES DAILY WITH MEALS
Qty: 60 TABLET | Refills: 5 | Status: SHIPPED | OUTPATIENT
Start: 2021-03-17 | End: 2021-09-16 | Stop reason: SDUPTHER

## 2021-03-17 RX ORDER — ASPIRIN 81 MG/1
81 TABLET ORAL DAILY
Qty: 90 TABLET | Refills: 3 | Status: SHIPPED | OUTPATIENT
Start: 2021-03-17 | End: 2021-09-16 | Stop reason: SDUPTHER

## 2021-03-17 RX ORDER — FLUTICASONE PROPIONATE 50 MCG
2 SPRAY, SUSPENSION (ML) NASAL DAILY
COMMUNITY

## 2021-03-17 RX ORDER — ATORVASTATIN CALCIUM 40 MG/1
40 TABLET, FILM COATED ORAL NIGHTLY
Qty: 30 TABLET | Refills: 5 | Status: SHIPPED | OUTPATIENT
Start: 2021-03-17 | End: 2021-09-16 | Stop reason: SDUPTHER

## 2021-03-17 RX ORDER — AMLODIPINE BESYLATE 5 MG/1
5 TABLET ORAL DAILY
Qty: 90 TABLET | Refills: 2 | Status: SHIPPED | OUTPATIENT
Start: 2021-03-17 | End: 2021-09-16 | Stop reason: SDUPTHER

## 2021-03-17 RX ORDER — SULFAMETHOXAZOLE AND TRIMETHOPRIM 800; 160 MG/1; MG/1
TABLET ORAL
Qty: 40 TABLET | Refills: 0 | Status: SHIPPED | OUTPATIENT
Start: 2021-03-17 | End: 2021-09-16

## 2021-03-17 RX ORDER — CLOPIDOGREL BISULFATE 75 MG/1
75 TABLET ORAL DAILY
Qty: 30 TABLET | Refills: 5 | Status: SHIPPED | OUTPATIENT
Start: 2021-03-17 | End: 2021-09-16 | Stop reason: SDUPTHER

## 2021-03-17 NOTE — PROGRESS NOTES
Deonna Lucio APRN  Grecia Galicia  1960 03/17/2021    Patient Active Problem List   Diagnosis   • Essential hypertension   • Pneumonia of both lower lobes due to infectious organism   • Dyslipidemia   • PVC's (premature ventricular contractions)   • Coronary artery disease due to lipid rich plaque       Dear Deonna Lucio APRN:    Subjective     History of Present Illness:    Chief Complaint   Patient presents with   • Follow-up     ROUTINE   • Med Management     MEDS TO OFFICE       Grecia Galicia is a pleasant 60 y.o. female with a past medical history significant for acute myocardial infarction with subsequent stenting, complete details unavailable but was around 2005, peripheral arterial disease status post intervention to the lower extremities, complete details unavailable, essential hypertension, dyslipidemia, and tobacco abuse.  She presents to the office today for routine follow-up    Micki reports that she has been doing very well since she was last seen he she reports to me that she quit smoking in December she used to average a pack a day since she was 17 years of age giving her a 64-pack-year history of tobacco abuse.  She reports that she is already starting to breathe better than she previously has in the past.  She does deny any chest pains today, palpitations, dizziness, or syncope.  She did recently get blood work performed which showed adequately controlled cholesterol as well as liver and renal function.      Allergies   Allergen Reactions   • No Known Drug Allergy    :      Current Outpatient Medications:   •  albuterol (PROVENTIL HFA;VENTOLIN HFA) 108 (90 BASE) MCG/ACT inhaler, Inhale 2 puffs every 4 (four) hours as needed for wheezing., Disp: , Rfl:   •  amLODIPine (NORVASC) 5 MG tablet, Take 1 tablet by mouth Daily., Disp: 90 tablet, Rfl: 2  •  atorvastatin (LIPITOR) 40 MG tablet, Take 1 tablet by mouth Every Night., Disp: 30 tablet, Rfl: 5  •   "budesonide-formoterol (SYMBICORT) 160-4.5 MCG/ACT inhaler, Inhale 2 puffs 2 (Two) Times a Day., Disp: , Rfl:   •  carvedilol (COREG) 25 MG tablet, Take 1 tablet by mouth 2 (Two) Times a Day With Meals., Disp: 60 tablet, Rfl: 5  •  clopidogrel (PLAVIX) 75 MG tablet, Take 1 tablet by mouth Daily., Disp: 30 tablet, Rfl: 5  •  fluticasone (FLONASE) 50 MCG/ACT nasal spray, 2 sprays into the nostril(s) as directed by provider Daily., Disp: , Rfl:   •  guaifenesin-dextromethorphan (ROBITUSSIN DM) 100-10 MG/5ML syrup, Take 5 mL by mouth Every 4 (Four) Hours As Needed for Cough., Disp: 236 mL, Rfl: 0  •  loratadine (CLARITIN) 10 MG tablet, Take 10 mg by mouth Every Night., Disp: , Rfl:   •  nitroglycerin (NITROSTAT) 0.4 MG SL tablet, Place 1 tablet under the tongue Every 5 (Five) Minutes As Needed for Chest Pain for up to 25 doses. 1tab q 5mins X3., Disp: 25 tablet, Rfl: 1  •  aspirin 81 MG EC tablet, Take 1 tablet by mouth Daily., Disp: 90 tablet, Rfl: 3  •  sulfamethoxazole-trimethoprim (BACTRIM DS,SEPTRA DS) 800-160 MG per tablet, 2 tabs PO BID, Disp: 40 tablet, Rfl: 0    The following portions of the patient's history were reviewed and updated as appropriate: allergies, current medications, past family history, past medical history, past social history, past surgical history and problem list.    Social History     Tobacco Use   • Smoking status: Current Every Day Smoker     Packs/day: 1.00     Years: 40.00     Pack years: 40.00     Types: Cigarettes   • Smokeless tobacco: Never Used   Substance Use Topics   • Alcohol use: No   • Drug use: No         Objective   Vitals:    03/17/21 1314   BP: 124/79   Pulse: 97   Temp: 98.4 °F (36.9 °C)   Weight: 60.3 kg (133 lb)   Height: 149.9 cm (59\")     Body mass index is 26.86 kg/m².    Constitutional:       General: Not in acute distress.     Appearance: Healthy appearance. Well-developed and not in distress. Not diaphoretic.   Eyes:      Conjunctiva/sclera: Conjunctivae normal.    "   Pupils: Pupils are equal, round, and reactive to light.   HENT:      Head: Normocephalic and atraumatic.   Neck:      Vascular: No carotid bruit or JVD.   Pulmonary:      Effort: Pulmonary effort is normal. No respiratory distress.      Breath sounds: Normal breath sounds.   Cardiovascular:      Normal rate. Regular rhythm.   Skin:     General: Skin is cool.   Neurological:      Mental Status: Alert, oriented to person, place, and time and oriented to person, place and time.         Lab Results   Component Value Date     03/10/2021    K 4.6 03/10/2021     03/10/2021    CO2 28.0 03/10/2021    BUN 18 03/10/2021    CREATININE 0.62 03/10/2021    GLUCOSE 111 (H) 03/10/2021    CALCIUM 10.3 03/10/2021    AST 20 03/10/2021    ALT 22 03/10/2021    ALKPHOS 203 (H) 03/10/2021     No results found for: CKTOTAL  Lab Results   Component Value Date    WBC 11.44 (H) 03/10/2021    HGB 13.1 03/10/2021    HCT 39.4 03/10/2021     03/10/2021     Lab Results   Component Value Date    INR 1.00 12/14/2014     Lab Results   Component Value Date    MG 2.2 11/03/2017     Lab Results   Component Value Date    TSH 0.899 03/10/2021    TRIG 51 03/10/2021    HDL 48 03/10/2021    LDL 80 03/10/2021      Lab Results   Component Value Date    BNP 22.0 11/01/2017       During this visit the following were done:  Labs Reviewed [x]    Labs Ordered []    Radiology Reports Reviewed []    Radiology Ordered []    PCP Records Reviewed []    Referring Provider Records Reviewed []    ER Records Reviewed []    Hospital Records Reviewed []    History Obtained From Family []    Radiology Images Reviewed []    Other Reviewed []    Records Requested []         ECG 12 Lead    Date/Time: 3/17/2021 1:14 PM  Performed by: Gavino Casarez PA-C  Authorized by: Gavino Casarez PA-C   Comparison: compared with previous ECG   Similar to previous ECG  Rhythm: sinus rhythm  Conduction: conduction normal  T inversion: V2    Clinical impression: normal  ECG            Assessment/Plan    Diagnosis Plan   1. Coronary artery disease due to lipid rich plaque     2. Dyslipidemia     3. Essential hypertension              Recommendations:  1. Coronary artery disease  1. Doing well clinically and is asymptomatic we will continue current therapy of aspirin, carvedilol, Plavix, and amlodipine.  2. Praised her on her abstinence of tobacco encouraged her to continue to do so.      No follow-ups on file.    As always, I appreciate very much the opportunity to participate in the cardiovascular care of your patients.      With Best Regards,    Gavino Casarez PA-C

## 2021-05-25 ENCOUNTER — HOSPITAL ENCOUNTER (OUTPATIENT)
Dept: HOSPITAL 79 - HEART 5 | Age: 61
End: 2021-05-25
Attending: INTERNAL MEDICINE
Payer: MEDICARE

## 2021-05-25 DIAGNOSIS — J44.9: Primary | ICD-10-CM

## 2021-08-03 ENCOUNTER — HOSPITAL ENCOUNTER (OUTPATIENT)
Dept: HOSPITAL 79 - KOH-I | Age: 61
End: 2021-08-03
Attending: INTERNAL MEDICINE
Payer: MEDICARE

## 2021-08-03 DIAGNOSIS — J45.909: ICD-10-CM

## 2021-08-03 DIAGNOSIS — Z87.891: ICD-10-CM

## 2021-08-03 DIAGNOSIS — Z12.2: Primary | ICD-10-CM

## 2021-08-03 DIAGNOSIS — I10: ICD-10-CM

## 2021-08-03 DIAGNOSIS — J43.9: ICD-10-CM

## 2021-08-24 ENCOUNTER — TRANSCRIBE ORDERS (OUTPATIENT)
Dept: ADMINISTRATIVE | Facility: HOSPITAL | Age: 61
End: 2021-08-24

## 2021-08-24 ENCOUNTER — HOSPITAL ENCOUNTER (OUTPATIENT)
Dept: RESPIRATORY THERAPY | Facility: HOSPITAL | Age: 61
Discharge: HOME | End: 2021-08-24
Admitting: INTERNAL MEDICINE

## 2021-08-24 DIAGNOSIS — G47.34 IDIOPATHIC SLEEP RELATED NONOBSTRUCTIVE ALVEOLAR HYPOVENTILATION: Primary | ICD-10-CM

## 2021-08-24 DIAGNOSIS — G47.34 IDIOPATHIC SLEEP RELATED NONOBSTRUCTIVE ALVEOLAR HYPOVENTILATION: ICD-10-CM

## 2021-08-24 LAB
A-A DO2: 34.3 MMHG (ref 0–300)
ARTERIAL PATENCY WRIST A: ABNORMAL
ATMOSPHERIC PRESS: 728 MMHG
BASE EXCESS BLDA CALC-SCNC: 0.4 MMOL/L (ref 0–2)
BDY SITE: ABNORMAL
BODY TEMPERATURE: 0 C
CO2 BLDA-SCNC: 25.9 MMOL/L (ref 22–33)
COHGB MFR BLD: <1 % (ref 0–5)
HCO3 BLDA-SCNC: 24.8 MMOL/L (ref 20–26)
HCT VFR BLD CALC: 41.9 % (ref 38–51)
HGB BLDA-MCNC: 13.7 G/DL (ref 13.5–17.5)
INHALED O2 CONCENTRATION: 21 %
Lab: ABNORMAL
METHGB BLD QL: <-0.1 % (ref 0–3)
MODALITY: ABNORMAL
NOTE: ABNORMAL
OXYHGB MFR BLDV: 92.8 % (ref 94–99)
PCO2 BLDA: 38.2 MM HG (ref 35–45)
PCO2 TEMP ADJ BLD: ABNORMAL MM[HG]
PH BLDA: 7.42 PH UNITS (ref 7.35–7.45)
PH, TEMP CORRECTED: ABNORMAL
PO2 BLDA: 65.7 MM HG (ref 83–108)
PO2 TEMP ADJ BLD: ABNORMAL MM[HG]
SAO2 % BLDCOA: 93.1 % (ref 94–99)
VENTILATOR MODE: ABNORMAL

## 2021-08-24 PROCEDURE — 82375 ASSAY CARBOXYHB QUANT: CPT

## 2021-08-24 PROCEDURE — 36600 WITHDRAWAL OF ARTERIAL BLOOD: CPT

## 2021-08-24 PROCEDURE — 83050 HGB METHEMOGLOBIN QUAN: CPT

## 2021-08-24 PROCEDURE — 82805 BLOOD GASES W/O2 SATURATION: CPT

## 2021-09-16 ENCOUNTER — OFFICE VISIT (OUTPATIENT)
Dept: CARDIOLOGY | Facility: CLINIC | Age: 61
End: 2021-09-16

## 2021-09-16 VITALS
BODY MASS INDEX: 28.95 KG/M2 | DIASTOLIC BLOOD PRESSURE: 85 MMHG | WEIGHT: 143.6 LBS | HEIGHT: 59 IN | TEMPERATURE: 97.3 F | SYSTOLIC BLOOD PRESSURE: 159 MMHG | HEART RATE: 76 BPM

## 2021-09-16 DIAGNOSIS — I10 ESSENTIAL HYPERTENSION: ICD-10-CM

## 2021-09-16 DIAGNOSIS — I25.10 CORONARY ARTERY DISEASE DUE TO LIPID RICH PLAQUE: Primary | ICD-10-CM

## 2021-09-16 DIAGNOSIS — I25.83 CORONARY ARTERY DISEASE DUE TO LIPID RICH PLAQUE: Primary | ICD-10-CM

## 2021-09-16 DIAGNOSIS — E78.5 DYSLIPIDEMIA: ICD-10-CM

## 2021-09-16 PROCEDURE — 93000 ELECTROCARDIOGRAM COMPLETE: CPT | Performed by: PHYSICIAN ASSISTANT

## 2021-09-16 PROCEDURE — 99213 OFFICE O/P EST LOW 20 MIN: CPT | Performed by: PHYSICIAN ASSISTANT

## 2021-09-16 RX ORDER — ATORVASTATIN CALCIUM 40 MG/1
40 TABLET, FILM COATED ORAL NIGHTLY
Qty: 30 TABLET | Refills: 5 | Status: SHIPPED | OUTPATIENT
Start: 2021-09-16 | End: 2023-03-06 | Stop reason: SDUPTHER

## 2021-09-16 RX ORDER — ASPIRIN 81 MG/1
81 TABLET ORAL DAILY
Qty: 90 TABLET | Refills: 3 | Status: SHIPPED | OUTPATIENT
Start: 2021-09-16

## 2021-09-16 RX ORDER — CARVEDILOL 25 MG/1
25 TABLET ORAL 2 TIMES DAILY WITH MEALS
Qty: 60 TABLET | Refills: 5 | Status: SHIPPED | OUTPATIENT
Start: 2021-09-16 | End: 2023-03-06 | Stop reason: SDUPTHER

## 2021-09-16 RX ORDER — AMLODIPINE BESYLATE 5 MG/1
5 TABLET ORAL DAILY
Qty: 90 TABLET | Refills: 2 | Status: SHIPPED | OUTPATIENT
Start: 2021-09-16 | End: 2022-03-01

## 2021-09-16 RX ORDER — CLOPIDOGREL BISULFATE 75 MG/1
75 TABLET ORAL DAILY
Qty: 30 TABLET | Refills: 5 | Status: SHIPPED | OUTPATIENT
Start: 2021-09-16 | End: 2023-03-06 | Stop reason: SDUPTHER

## 2021-09-16 NOTE — PROGRESS NOTES
Deonna Lucio APRN  Grecia Galicia  1960 09/16/2021    Patient Active Problem List   Diagnosis   • Essential hypertension   • Pneumonia of both lower lobes due to infectious organism   • Dyslipidemia   • PVC's (premature ventricular contractions)   • Coronary artery disease due to lipid rich plaque       Dear Deonna Lucio APRN:    Subjective     History of Present Illness:    Chief Complaint   Patient presents with   • Follow-up     ROUTINE       Grecia Galicia is a pleasant 60 y.o. female with a past medical history significant for acute myocardial infarction with subsequent stenting, complete details unavailable but was around 2005, peripheral arterial disease status post intervention to the lower extremities, complete details unavailable, essential hypertension, dyslipidemia, and 64 pack year history of tobacco abuse but quit one year ago.  She presents to the office today for routine follow-up.    Lulu denies any chest pains today, palpitations, dizziness, or syncope.  She does tell me she was recently told that she does have a nodule/mass in her lung and is following with pulmonology who did recommend biopsy which she is not sure she wants to pursue.  She does believe this is likely the reason why her blood pressure is elevated today.    Allergies   Allergen Reactions   • No Known Drug Allergy    :      Current Outpatient Medications:   •  albuterol (PROVENTIL HFA;VENTOLIN HFA) 108 (90 BASE) MCG/ACT inhaler, Inhale 2 puffs every 4 (four) hours as needed for wheezing., Disp: , Rfl:   •  aspirin 81 MG EC tablet, Take 1 tablet by mouth Daily., Disp: 90 tablet, Rfl: 3  •  atorvastatin (LIPITOR) 40 MG tablet, Take 1 tablet by mouth Every Night., Disp: 30 tablet, Rfl: 5  •  budesonide-formoterol (SYMBICORT) 160-4.5 MCG/ACT inhaler, Inhale 2 puffs 2 (Two) Times a Day., Disp: , Rfl:   •  carvedilol (COREG) 25 MG tablet, Take 1 tablet by mouth 2 (Two) Times a Day With Meals., Disp: 60  "tablet, Rfl: 5  •  clopidogrel (PLAVIX) 75 MG tablet, Take 1 tablet by mouth Daily., Disp: 30 tablet, Rfl: 5  •  loratadine (CLARITIN) 10 MG tablet, Take 10 mg by mouth Every Night., Disp: , Rfl:   •  nitroglycerin (NITROSTAT) 0.4 MG SL tablet, Place 1 tablet under the tongue Every 5 (Five) Minutes As Needed for Chest Pain for up to 25 doses. 1tab q 5mins X3., Disp: 25 tablet, Rfl: 1  •  Trelegy Ellipta 100-62.5-25 MCG/INH inhaler, INHALE ONE PUFF BY MOUTH EVERY DAY FOR BREATHING, Disp: , Rfl:   •  amLODIPine (NORVASC) 5 MG tablet, Take 1 tablet by mouth Daily., Disp: 90 tablet, Rfl: 2  •  fluticasone (FLONASE) 50 MCG/ACT nasal spray, 2 sprays into the nostril(s) as directed by provider Daily., Disp: , Rfl:   •  guaifenesin-dextromethorphan (ROBITUSSIN DM) 100-10 MG/5ML syrup, Take 5 mL by mouth Every 4 (Four) Hours As Needed for Cough., Disp: 236 mL, Rfl: 0    The following portions of the patient's history were reviewed and updated as appropriate: allergies, current medications, past family history, past medical history, past social history, past surgical history and problem list.    Social History     Tobacco Use   • Smoking status: Former Smoker     Packs/day: 1.00     Years: 40.00     Pack years: 40.00     Types: Cigarettes   • Smokeless tobacco: Never Used   • Tobacco comment: QUIT DEC 2020   Substance Use Topics   • Alcohol use: No   • Drug use: No         Objective   Vitals:    09/16/21 1411   BP: 159/85   Pulse: 76   Temp: 97.3 °F (36.3 °C)   Weight: 65.1 kg (143 lb 9.6 oz)   Height: 149.9 cm (59\")     Body mass index is 29 kg/m².    Constitutional:       General: Not in acute distress.     Appearance: Healthy appearance. Well-developed and not in distress. Not diaphoretic.   Eyes:      Conjunctiva/sclera: Conjunctivae normal.      Pupils: Pupils are equal, round, and reactive to light.   HENT:      Head: Normocephalic and atraumatic.   Neck:      Vascular: No carotid bruit or JVD.   Pulmonary:      Effort: " Pulmonary effort is normal. No respiratory distress.      Breath sounds: Normal breath sounds.   Cardiovascular:      Normal rate. Regular rhythm.   Skin:     General: Skin is cool.   Neurological:      Mental Status: Alert, oriented to person, place, and time and oriented to person, place and time.         Lab Results   Component Value Date     03/10/2021    K 4.6 03/10/2021     03/10/2021    CO2 28.0 03/10/2021    BUN 18 03/10/2021    CREATININE 0.62 03/10/2021    GLUCOSE 111 (H) 03/10/2021    CALCIUM 10.3 03/10/2021    AST 20 03/10/2021    ALT 22 03/10/2021    ALKPHOS 203 (H) 03/10/2021     No results found for: CKTOTAL  Lab Results   Component Value Date    WBC 11.44 (H) 03/10/2021    HGB 13.1 03/10/2021    HCT 39.4 03/10/2021     03/10/2021     Lab Results   Component Value Date    INR 1.00 12/14/2014     Lab Results   Component Value Date    MG 2.2 11/03/2017     Lab Results   Component Value Date    TSH 0.899 03/10/2021    TRIG 51 03/10/2021    HDL 48 03/10/2021    LDL 80 03/10/2021      Lab Results   Component Value Date    BNP 22.0 11/01/2017       During this visit the following were done:  Labs Reviewed []    Labs Ordered []    Radiology Reports Reviewed []    Radiology Ordered []    PCP Records Reviewed []    Referring Provider Records Reviewed []    ER Records Reviewed []    Hospital Records Reviewed []    History Obtained From Family []    Radiology Images Reviewed []    Other Reviewed []    Records Requested []         ECG 12 Lead    Date/Time: 9/16/2021 2:11 PM  Performed by: Gavino Casarez PA-C  Authorized by: Gavino Casarez PA-C   Comparison: compared with previous ECG   Similar to previous ECG  Rhythm: sinus rhythm  Conduction: conduction normal  ST Segments: ST segments normal    Clinical impression: non-specific ECG            Assessment/Plan    Diagnosis Plan   1. Coronary artery disease due to lipid rich plaque     2. Dyslipidemia     3. Essential hypertension               Recommendations:  1. Coronary artery disease  1. Clinically stable denies any chest pains today.  Continue aspirin, Lipitor, Coreg, Plavix.  2. Essential hypertension  1. Blood pressure is elevated in the office today however she was just recently informed of a lung nodule that might need biopsied and is worried about this so I will hold off on adjusting antihypertensives today.  I would did refill all of her medications.  I asked her to monitor blood pressure closely and if it remains persistently greater than 150 mmHg systolic to let us know and I will adjust antihypertensives.      No follow-ups on file.    As always, I appreciate very much the opportunity to participate in the cardiovascular care of your patients.      With Best Regards,    Gavino Casarez PA-C

## 2021-09-28 ENCOUNTER — HOSPITAL ENCOUNTER (OUTPATIENT)
Dept: HOSPITAL 79 - CT | Age: 61
End: 2021-09-28
Attending: INTERNAL MEDICINE
Payer: MEDICARE

## 2021-09-28 DIAGNOSIS — I10: ICD-10-CM

## 2021-09-28 DIAGNOSIS — R91.1: ICD-10-CM

## 2021-09-28 DIAGNOSIS — K21.9: ICD-10-CM

## 2021-09-28 DIAGNOSIS — Z87.09: ICD-10-CM

## 2021-09-28 DIAGNOSIS — I25.10: ICD-10-CM

## 2021-09-28 DIAGNOSIS — Z92.29: ICD-10-CM

## 2021-09-28 DIAGNOSIS — E78.00: ICD-10-CM

## 2021-09-28 DIAGNOSIS — Z87.891: ICD-10-CM

## 2021-09-28 DIAGNOSIS — J43.9: Primary | ICD-10-CM

## 2021-09-28 LAB
HGB BLD-MCNC: 14.4 GM/DL (ref 12.3–15.3)
RED BLOOD COUNT: 4.48 M/UL (ref 4–5.1)
WHITE BLOOD COUNT: 5.7 K/UL (ref 4.5–11)

## 2021-10-06 RX ORDER — ATORVASTATIN CALCIUM 40 MG/1
TABLET, FILM COATED ORAL
Qty: 30 TABLET | Refills: 5 | OUTPATIENT
Start: 2021-10-06

## 2022-01-20 ENCOUNTER — HOSPITAL ENCOUNTER (OUTPATIENT)
Dept: HOSPITAL 79 - KOH-I | Age: 62
End: 2022-01-20
Attending: INTERNAL MEDICINE
Payer: MEDICARE

## 2022-01-20 DIAGNOSIS — R91.1: Primary | ICD-10-CM

## 2022-01-20 DIAGNOSIS — J43.9: ICD-10-CM

## 2022-01-20 DIAGNOSIS — R91.8: ICD-10-CM

## 2022-02-16 ENCOUNTER — LAB (OUTPATIENT)
Dept: LAB | Facility: HOSPITAL | Age: 62
End: 2022-02-16

## 2022-02-16 ENCOUNTER — TRANSCRIBE ORDERS (OUTPATIENT)
Dept: ADMINISTRATIVE | Facility: HOSPITAL | Age: 62
End: 2022-02-16

## 2022-02-16 DIAGNOSIS — R79.89 HYPOURICEMIA: ICD-10-CM

## 2022-02-16 DIAGNOSIS — R68.89 MECHANICAL AND MOTOR PROBLEMS WITH INTERNAL ORGANS: Primary | ICD-10-CM

## 2022-02-16 DIAGNOSIS — R73.01 IMPAIRED FASTING GLUCOSE: ICD-10-CM

## 2022-02-16 DIAGNOSIS — E78.5 HYPERLIPIDEMIA, UNSPECIFIED HYPERLIPIDEMIA TYPE: ICD-10-CM

## 2022-02-16 DIAGNOSIS — R68.89 MECHANICAL AND MOTOR PROBLEMS WITH INTERNAL ORGANS: ICD-10-CM

## 2022-02-16 DIAGNOSIS — E55.9 VITAMIN D DEFICIENCY: ICD-10-CM

## 2022-02-16 LAB
ALBUMIN SERPL-MCNC: 4.7 G/DL (ref 3.5–5.2)
ALBUMIN/GLOB SERPL: 1.8 G/DL
ALP SERPL-CCNC: 153 U/L (ref 39–117)
ALT SERPL W P-5'-P-CCNC: 30 U/L (ref 1–33)
ANION GAP SERPL CALCULATED.3IONS-SCNC: 8.9 MMOL/L (ref 5–15)
AST SERPL-CCNC: 15 U/L (ref 1–32)
BASOPHILS # BLD AUTO: 0.04 10*3/MM3 (ref 0–0.2)
BASOPHILS NFR BLD AUTO: 0.4 % (ref 0–1.5)
BILIRUB SERPL-MCNC: 0.3 MG/DL (ref 0–1.2)
BUN SERPL-MCNC: 21 MG/DL (ref 8–23)
BUN/CREAT SERPL: 29.6 (ref 7–25)
CALCIUM SPEC-SCNC: 9.9 MG/DL (ref 8.6–10.5)
CHLORIDE SERPL-SCNC: 106 MMOL/L (ref 98–107)
CHOLEST SERPL-MCNC: 158 MG/DL (ref 0–200)
CO2 SERPL-SCNC: 24.1 MMOL/L (ref 22–29)
CREAT SERPL-MCNC: 0.71 MG/DL (ref 0.57–1)
DEPRECATED RDW RBC AUTO: 44 FL (ref 37–54)
EOSINOPHIL # BLD AUTO: 0.09 10*3/MM3 (ref 0–0.4)
EOSINOPHIL NFR BLD AUTO: 0.8 % (ref 0.3–6.2)
ERYTHROCYTE [DISTWIDTH] IN BLOOD BY AUTOMATED COUNT: 12.8 % (ref 12.3–15.4)
GFR SERPL CREATININE-BSD FRML MDRD: 84 ML/MIN/1.73
GLOBULIN UR ELPH-MCNC: 2.6 GM/DL
GLUCOSE SERPL-MCNC: 93 MG/DL (ref 65–99)
HBA1C MFR BLD: 5.9 % (ref 4.8–5.6)
HCT VFR BLD AUTO: 41.2 % (ref 34–46.6)
HDLC SERPL-MCNC: 50 MG/DL (ref 40–60)
HGB BLD-MCNC: 13.8 G/DL (ref 12–15.9)
IMM GRANULOCYTES # BLD AUTO: 0.03 10*3/MM3 (ref 0–0.05)
IMM GRANULOCYTES NFR BLD AUTO: 0.3 % (ref 0–0.5)
LDLC SERPL CALC-MCNC: 92 MG/DL (ref 0–100)
LDLC/HDLC SERPL: 1.83 {RATIO}
LYMPHOCYTES # BLD AUTO: 2.4 10*3/MM3 (ref 0.7–3.1)
LYMPHOCYTES NFR BLD AUTO: 22.4 % (ref 19.6–45.3)
MCH RBC QN AUTO: 31.8 PG (ref 26.6–33)
MCHC RBC AUTO-ENTMCNC: 33.5 G/DL (ref 31.5–35.7)
MCV RBC AUTO: 94.9 FL (ref 79–97)
MONOCYTES # BLD AUTO: 0.67 10*3/MM3 (ref 0.1–0.9)
MONOCYTES NFR BLD AUTO: 6.3 % (ref 5–12)
NEUTROPHILS NFR BLD AUTO: 69.8 % (ref 42.7–76)
NEUTROPHILS NFR BLD AUTO: 7.47 10*3/MM3 (ref 1.7–7)
NRBC BLD AUTO-RTO: 0 /100 WBC (ref 0–0.2)
PLATELET # BLD AUTO: 253 10*3/MM3 (ref 140–450)
PMV BLD AUTO: 11.3 FL (ref 6–12)
POTASSIUM SERPL-SCNC: 4.2 MMOL/L (ref 3.5–5.2)
PROT SERPL-MCNC: 7.3 G/DL (ref 6–8.5)
RBC # BLD AUTO: 4.34 10*6/MM3 (ref 3.77–5.28)
SODIUM SERPL-SCNC: 139 MMOL/L (ref 136–145)
TRIGL SERPL-MCNC: 83 MG/DL (ref 0–150)
TSH SERPL DL<=0.05 MIU/L-ACNC: 1.06 UIU/ML (ref 0.27–4.2)
VLDLC SERPL-MCNC: 16 MG/DL (ref 5–40)
WBC NRBC COR # BLD: 10.7 10*3/MM3 (ref 3.4–10.8)

## 2022-02-16 PROCEDURE — 80053 COMPREHEN METABOLIC PANEL: CPT

## 2022-02-16 PROCEDURE — 85025 COMPLETE CBC W/AUTO DIFF WBC: CPT

## 2022-02-16 PROCEDURE — 80061 LIPID PANEL: CPT

## 2022-02-16 PROCEDURE — 83036 HEMOGLOBIN GLYCOSYLATED A1C: CPT

## 2022-02-16 PROCEDURE — 84443 ASSAY THYROID STIM HORMONE: CPT

## 2022-02-16 PROCEDURE — 36415 COLL VENOUS BLD VENIPUNCTURE: CPT

## 2022-03-01 RX ORDER — AMLODIPINE BESYLATE 5 MG/1
TABLET ORAL
Qty: 30 TABLET | Refills: 2 | Status: SHIPPED | OUTPATIENT
Start: 2022-03-01 | End: 2023-03-06

## 2022-03-22 ENCOUNTER — OFFICE VISIT (OUTPATIENT)
Dept: CARDIOLOGY | Facility: CLINIC | Age: 62
End: 2022-03-22

## 2022-03-22 ENCOUNTER — TELEPHONE (OUTPATIENT)
Dept: CARDIOLOGY | Facility: CLINIC | Age: 62
End: 2022-03-22

## 2022-03-22 VITALS
SYSTOLIC BLOOD PRESSURE: 126 MMHG | BODY MASS INDEX: 27.34 KG/M2 | TEMPERATURE: 97.5 F | HEIGHT: 59 IN | HEART RATE: 92 BPM | DIASTOLIC BLOOD PRESSURE: 77 MMHG | WEIGHT: 135.6 LBS

## 2022-03-22 DIAGNOSIS — I25.83 CORONARY ARTERY DISEASE DUE TO LIPID RICH PLAQUE: Primary | ICD-10-CM

## 2022-03-22 DIAGNOSIS — I10 ESSENTIAL HYPERTENSION: ICD-10-CM

## 2022-03-22 DIAGNOSIS — I25.10 CORONARY ARTERY DISEASE DUE TO LIPID RICH PLAQUE: Primary | ICD-10-CM

## 2022-03-22 PROCEDURE — 99213 OFFICE O/P EST LOW 20 MIN: CPT | Performed by: PHYSICIAN ASSISTANT

## 2022-03-22 PROCEDURE — 93000 ELECTROCARDIOGRAM COMPLETE: CPT | Performed by: PHYSICIAN ASSISTANT

## 2022-03-22 RX ORDER — FAMOTIDINE 20 MG/1
20 TABLET, FILM COATED ORAL 2 TIMES DAILY
Qty: 60 TABLET | Refills: 3 | Status: SHIPPED | OUTPATIENT
Start: 2022-03-22

## 2022-03-22 RX ORDER — NITROGLYCERIN 0.4 MG/1
0.4 TABLET SUBLINGUAL
Qty: 25 TABLET | Refills: 1 | Status: SHIPPED | OUTPATIENT
Start: 2022-03-22 | End: 2022-09-28 | Stop reason: SDUPTHER

## 2022-03-22 NOTE — PROGRESS NOTES
Jeffrey Haro, BUDDY  Grecia Galicia  1960 03/22/2022    Patient Active Problem List   Diagnosis   • Essential hypertension   • Pneumonia of both lower lobes due to infectious organism   • Dyslipidemia   • PVC's (premature ventricular contractions)   • Coronary artery disease due to lipid rich plaque       Dear Jeffrey Haro, BUDDY:    Subjective     History of Present Illness:    Chief Complaint   Patient presents with   • Follow-up     ROUTINE   • Heartburn       Grecia Galicia is a pleasant 61 y.o. female with a past medical history significant for acute myocardial infarction with subsequent stenting, complete details unavailable but was around 2005, peripheral arterial disease status post intervention to the lower extremities, complete details unavailable, essential hypertension, dyslipidemia, and 64 pack year history of tobacco abuse but quit one year ago.  She presents to the office today for routine follow-up.    Grecia reports she has been doing excellent since she was last seen and has no complaints bring forth. Patient denies any chest pain, shortness of breath, palpitations, dizziness, syncope or near syncope. Her only symptoms that she does report is burning in her throat that she feels when she lays down at night and is improved with tums.     Allergies   Allergen Reactions   • No Known Drug Allergy    :      Current Outpatient Medications:   •  albuterol (PROVENTIL HFA;VENTOLIN HFA) 108 (90 BASE) MCG/ACT inhaler, Inhale 2 puffs every 4 (four) hours as needed for wheezing., Disp: , Rfl:   •  aspirin 81 MG EC tablet, Take 1 tablet by mouth Daily., Disp: 90 tablet, Rfl: 3  •  atorvastatin (LIPITOR) 40 MG tablet, Take 1 tablet by mouth Every Night., Disp: 30 tablet, Rfl: 5  •  carvedilol (COREG) 25 MG tablet, Take 1 tablet by mouth 2 (Two) Times a Day With Meals., Disp: 60 tablet, Rfl: 5  •  clopidogrel (PLAVIX) 75 MG tablet, Take 1 tablet by mouth Daily., Disp: 30 tablet, Rfl:  "5  •  fluticasone (FLONASE) 50 MCG/ACT nasal spray, 2 sprays into the nostril(s) as directed by provider Daily., Disp: , Rfl:   •  guaifenesin-dextromethorphan (ROBITUSSIN DM) 100-10 MG/5ML syrup, Take 5 mL by mouth Every 4 (Four) Hours As Needed for Cough., Disp: 236 mL, Rfl: 0  •  loratadine (CLARITIN) 10 MG tablet, Take 10 mg by mouth Every Night., Disp: , Rfl:   •  metFORMIN (GLUCOPHAGE) 500 MG tablet, Take 500 mg by mouth Daily. for blood sugar, Disp: , Rfl:   •  nitroglycerin (NITROSTAT) 0.4 MG SL tablet, Place 1 tablet under the tongue Every 5 (Five) Minutes As Needed for Chest Pain for up to 25 doses. 1tab q 5mins X3., Disp: 25 tablet, Rfl: 1  •  Trelegy Ellipta 100-62.5-25 MCG/INH inhaler, INHALE ONE PUFF BY MOUTH EVERY DAY FOR BREATHING, Disp: , Rfl:   •  amLODIPine (NORVASC) 5 MG tablet, TAKE 1 TABLET BY MOUTH EVERY DAY, Disp: 30 tablet, Rfl: 2  •  budesonide-formoterol (SYMBICORT) 160-4.5 MCG/ACT inhaler, Inhale 2 puffs 2 (Two) Times a Day., Disp: , Rfl:   •  famotidine (Pepcid) 20 MG tablet, Take 1 tablet by mouth 2 (Two) Times a Day., Disp: 60 tablet, Rfl: 3    The following portions of the patient's history were reviewed and updated as appropriate: allergies, current medications, past family history, past medical history, past social history, past surgical history and problem list.    Social History     Tobacco Use   • Smoking status: Former Smoker     Packs/day: 1.00     Years: 40.00     Pack years: 40.00     Types: Cigarettes   • Smokeless tobacco: Never Used   • Tobacco comment: QUIT DEC 2020   Substance Use Topics   • Alcohol use: No   • Drug use: No         Objective   Vitals:    03/22/22 1147   BP: 126/77   Pulse: 92   Temp: 97.5 °F (36.4 °C)   Weight: 61.5 kg (135 lb 9.6 oz)   Height: 149.9 cm (59\")     Body mass index is 27.39 kg/m².    Constitutional:       General: Not in acute distress.     Appearance: Healthy appearance. Well-developed and not in distress. Not diaphoretic.   Eyes:      " Conjunctiva/sclera: Conjunctivae normal.      Pupils: Pupils are equal, round, and reactive to light.   HENT:      Head: Normocephalic and atraumatic.   Neck:      Vascular: No carotid bruit or JVD.   Pulmonary:      Effort: Pulmonary effort is normal. No respiratory distress.      Breath sounds: Normal breath sounds.   Cardiovascular:      Normal rate. Regular rhythm.   Skin:     General: Skin is cool.   Neurological:      Mental Status: Alert, oriented to person, place, and time and oriented to person, place and time.         Lab Results   Component Value Date     02/16/2022    K 4.2 02/16/2022     02/16/2022    CO2 24.1 02/16/2022    BUN 21 02/16/2022    CREATININE 0.71 02/16/2022    GLUCOSE 93 02/16/2022    CALCIUM 9.9 02/16/2022    AST 15 02/16/2022    ALT 30 02/16/2022    ALKPHOS 153 (H) 02/16/2022     No results found for: CKTOTAL  Lab Results   Component Value Date    WBC 10.70 02/16/2022    HGB 13.8 02/16/2022    HCT 41.2 02/16/2022     02/16/2022     Lab Results   Component Value Date    INR 1.00 12/14/2014     Lab Results   Component Value Date    MG 2.2 11/03/2017     Lab Results   Component Value Date    TSH 1.060 02/16/2022    TRIG 83 02/16/2022    HDL 50 02/16/2022    LDL 92 02/16/2022      Lab Results   Component Value Date    BNP 22.0 11/01/2017       During this visit the following were done:  Labs Reviewed []    Labs Ordered []    Radiology Reports Reviewed []    Radiology Ordered []    PCP Records Reviewed []    Referring Provider Records Reviewed []    ER Records Reviewed []    Hospital Records Reviewed []    History Obtained From Family []    Radiology Images Reviewed []    Other Reviewed []    Records Requested []         ECG 12 Lead    Date/Time: 3/22/2022 11:48 AM  Performed by: Gavino Casarez PA-C  Authorized by: Gavino Casarez PA-C   Comparison: compared with previous ECG   Similar to previous ECG  Rhythm: sinus rhythm  Conduction: conduction normal  ST Segments:  ST segments normal    Clinical impression: normal ECG            Assessment/Plan    Diagnosis Plan   1. Coronary artery disease due to lipid rich plaque     2. Essential hypertension              Recommendations:  1. Coronary artery disease  1. Clinically stable denies any recent anginal symptoms.  I will continue aspirin, Lipitor, carvedilol, Plavix.  2. Essential hypertension  1. Clinically doing well we will continue current regimen  3. GERD  1. Patient does report some burning in her throat particular when she lays down at night that is better with Tums.  2. I will go ahead and try her on pepcid 20 mg BID with meal to help. I advised her that if she does get symptom improvement with this to see her pcp.       Return in about 3 months (around 6/22/2022).    As always, I appreciate very much the opportunity to participate in the cardiovascular care of your patients.      With Best Regards,    Gavino Casarez PA-C

## 2022-03-22 NOTE — TELEPHONE ENCOUNTER
Pt called in stating when she seen you today you were going to send over medication for GERD but has not been sent to pharmacy, please advise.

## 2022-08-09 ENCOUNTER — OFFICE VISIT (OUTPATIENT)
Dept: CARDIOLOGY | Facility: CLINIC | Age: 62
End: 2022-08-09

## 2022-08-09 VITALS
DIASTOLIC BLOOD PRESSURE: 72 MMHG | BODY MASS INDEX: 21.13 KG/M2 | WEIGHT: 123.8 LBS | HEART RATE: 73 BPM | SYSTOLIC BLOOD PRESSURE: 115 MMHG | RESPIRATION RATE: 16 BRPM | HEIGHT: 64 IN

## 2022-08-09 DIAGNOSIS — I25.83 CORONARY ARTERY DISEASE DUE TO LIPID RICH PLAQUE: Primary | ICD-10-CM

## 2022-08-09 DIAGNOSIS — I10 ESSENTIAL HYPERTENSION: ICD-10-CM

## 2022-08-09 DIAGNOSIS — E78.5 DYSLIPIDEMIA: ICD-10-CM

## 2022-08-09 DIAGNOSIS — I25.10 CORONARY ARTERY DISEASE DUE TO LIPID RICH PLAQUE: Primary | ICD-10-CM

## 2022-08-09 PROCEDURE — 99213 OFFICE O/P EST LOW 20 MIN: CPT | Performed by: PHYSICIAN ASSISTANT

## 2022-08-09 NOTE — PROGRESS NOTES
MondayGaby, BUDDY  Grecia Galicia  1960 08/09/2022    Patient Active Problem List   Diagnosis   • Essential hypertension   • Pneumonia of both lower lobes due to infectious organism   • Dyslipidemia   • PVC's (premature ventricular contractions)   • Coronary artery disease due to lipid rich plaque       Dear Monday, Gaby Jonas, APRN:    Subjective     History of Present Illness:    Chief Complaint   Patient presents with   • Coronary Artery Disease     4+ mos follow   • Shortness of Breath     Relates to COPD   • Med Management     presented       Grecia Galicia is a pleasant 61 y.o. female with a past medical history significant for acute myocardial infarction with subsequent stenting, complete details unavailable but was around 2005, peripheral arterial disease status post intervention to the lower extremities, complete details unavailable, essential hypertension, dyslipidemia, and 64 pack year history of tobacco abuse but quit one year ago.  She presents to the office today for routine follow-up.     Lulu reports since starting Pepcid her heartburn has completely resolved.  She is otherwise completely asymptomatic today as well. Patient denies any chest pain, shortness of breath, palpitations, dizziness, syncope or near syncope.     Allergies   Allergen Reactions   • No Known Drug Allergy    :      Current Outpatient Medications:   •  albuterol (PROVENTIL HFA;VENTOLIN HFA) 108 (90 BASE) MCG/ACT inhaler, Inhale 2 puffs every 4 (four) hours as needed for wheezing., Disp: , Rfl:   •  aspirin 81 MG EC tablet, Take 1 tablet by mouth Daily., Disp: 90 tablet, Rfl: 3  •  atorvastatin (LIPITOR) 40 MG tablet, Take 1 tablet by mouth Every Night., Disp: 30 tablet, Rfl: 5  •  carvedilol (COREG) 25 MG tablet, Take 1 tablet by mouth 2 (Two) Times a Day With Meals., Disp: 60 tablet, Rfl: 5  •  clopidogrel (PLAVIX) 75 MG tablet, Take 1 tablet by mouth Daily., Disp: 30 tablet, Rfl: 5  •  famotidine  "(Pepcid) 20 MG tablet, Take 1 tablet by mouth 2 (Two) Times a Day., Disp: 60 tablet, Rfl: 3  •  fluticasone (FLONASE) 50 MCG/ACT nasal spray, 2 sprays into the nostril(s) as directed by provider Daily., Disp: , Rfl:   •  loratadine (CLARITIN) 10 MG tablet, Take 10 mg by mouth Every Night., Disp: , Rfl:   •  metFORMIN (GLUCOPHAGE) 500 MG tablet, Take 500 mg by mouth Daily. for blood sugar, Disp: , Rfl:   •  nitroglycerin (NITROSTAT) 0.4 MG SL tablet, Place 1 tablet under the tongue Every 5 (Five) Minutes As Needed for Chest Pain for up to 25 doses. 1tab q 5mins X3., Disp: 25 tablet, Rfl: 1  •  Trelegy Ellipta 100-62.5-25 MCG/INH inhaler, INHALE ONE PUFF BY MOUTH EVERY DAY FOR BREATHING, Disp: , Rfl:   •  amLODIPine (NORVASC) 5 MG tablet, TAKE 1 TABLET BY MOUTH EVERY DAY, Disp: 30 tablet, Rfl: 2  •  budesonide-formoterol (SYMBICORT) 160-4.5 MCG/ACT inhaler, Inhale 2 puffs 2 (Two) Times a Day., Disp: , Rfl:   •  guaifenesin-dextromethorphan (ROBITUSSIN DM) 100-10 MG/5ML syrup, Take 5 mL by mouth Every 4 (Four) Hours As Needed for Cough., Disp: 236 mL, Rfl: 0    The following portions of the patient's history were reviewed and updated as appropriate: allergies, current medications, past family history, past medical history, past social history, past surgical history and problem list.    Social History     Tobacco Use   • Smoking status: Former Smoker     Packs/day: 2.00     Years: 40.00     Pack years: 80.00     Types: Cigarettes     Quit date:      Years since quittin.6   • Smokeless tobacco: Never Used   • Tobacco comment: QUIT DEC 2020   Substance Use Topics   • Alcohol use: No   • Drug use: No         Objective   Vitals:    22 1418   BP: 115/72   Pulse: 73   Resp: 16   Weight: 56.2 kg (123 lb 12.8 oz)   Height: 162.6 cm (64\")     Body mass index is 21.25 kg/m².    Constitutional:       General: Not in acute distress.     Appearance: Healthy appearance. Well-developed and not in distress. Not " diaphoretic.   Eyes:      Conjunctiva/sclera: Conjunctivae normal.      Pupils: Pupils are equal, round, and reactive to light.   HENT:      Head: Normocephalic and atraumatic.   Neck:      Vascular: No carotid bruit or JVD.   Pulmonary:      Effort: Pulmonary effort is normal. No respiratory distress.      Breath sounds: Normal breath sounds.   Cardiovascular:      Normal rate. Regular rhythm.   Skin:     General: Skin is cool.   Neurological:      Mental Status: Alert, oriented to person, place, and time and oriented to person, place and time.         Lab Results   Component Value Date     02/16/2022    K 4.2 02/16/2022     02/16/2022    CO2 24.1 02/16/2022    BUN 21 02/16/2022    CREATININE 0.71 02/16/2022    GLUCOSE 93 02/16/2022    CALCIUM 9.9 02/16/2022    AST 15 02/16/2022    ALT 30 02/16/2022    ALKPHOS 153 (H) 02/16/2022     No results found for: CKTOTAL  Lab Results   Component Value Date    WBC 10.70 02/16/2022    HGB 13.8 02/16/2022    HCT 41.2 02/16/2022     02/16/2022     Lab Results   Component Value Date    INR 1.00 12/14/2014     Lab Results   Component Value Date    MG 2.2 11/03/2017     Lab Results   Component Value Date    TSH 1.060 02/16/2022    TRIG 83 02/16/2022    HDL 50 02/16/2022    LDL 92 02/16/2022      Lab Results   Component Value Date    BNP 22.0 11/01/2017       During this visit the following were done:  Labs Reviewed []    Labs Ordered []    Radiology Reports Reviewed []    Radiology Ordered []    PCP Records Reviewed []    Referring Provider Records Reviewed []    ER Records Reviewed []    Hospital Records Reviewed []    History Obtained From Family []    Radiology Images Reviewed []    Other Reviewed []    Records Requested []       Procedures    Assessment & Plan    Diagnosis Plan   1. Coronary artery disease due to lipid rich plaque     2. Dyslipidemia     3. Essential hypertension              Recommendations:  1. Coronary artery disease  1. No recent anginal  symptoms.  We will continue current regimen with aspirin, Lipitor, carvedilol, Plavix, amlodipine.  2. GERD  1. Resolved with addition of Pepcid.  I did ask for her to bring this up with her PCP as well.      No follow-ups on file.    As always, I appreciate very much the opportunity to participate in the cardiovascular care of your patients.      With Best Regards,    Gavino Casarez PA-C

## 2022-09-28 ENCOUNTER — TELEPHONE (OUTPATIENT)
Dept: CARDIOLOGY | Facility: CLINIC | Age: 62
End: 2022-09-28

## 2022-09-28 RX ORDER — NITROGLYCERIN 0.4 MG/1
0.4 TABLET SUBLINGUAL
Qty: 25 TABLET | Refills: 1 | Status: SHIPPED | OUTPATIENT
Start: 2022-09-28

## 2022-10-26 DIAGNOSIS — M25.572 LEFT ANKLE PAIN, UNSPECIFIED CHRONICITY: Primary | ICD-10-CM

## 2022-10-27 ENCOUNTER — OFFICE VISIT (OUTPATIENT)
Dept: ORTHOPEDIC SURGERY | Facility: CLINIC | Age: 62
End: 2022-10-27

## 2022-10-27 VITALS
WEIGHT: 123 LBS | HEART RATE: 81 BPM | BODY MASS INDEX: 21 KG/M2 | DIASTOLIC BLOOD PRESSURE: 78 MMHG | HEIGHT: 64 IN | SYSTOLIC BLOOD PRESSURE: 142 MMHG

## 2022-10-27 DIAGNOSIS — S82.832A OTHER CLOSED FRACTURE OF DISTAL END OF LEFT FIBULA, INITIAL ENCOUNTER: Primary | ICD-10-CM

## 2022-10-27 PROCEDURE — 27786 TREATMENT OF ANKLE FRACTURE: CPT | Performed by: PHYSICIAN ASSISTANT

## 2022-10-27 RX ORDER — MONTELUKAST SODIUM 10 MG/1
TABLET ORAL
COMMUNITY
Start: 2022-10-26

## 2022-10-27 NOTE — PROGRESS NOTES
[facility] New Patient Visit          Patient: Grecia Galicia  YOB: 1960  Date of Encounter: 10/27/2022  PCP: MonGaby england APRN      Subjective     Chief Complaint   Patient presents with   • Left Ankle - Initial Evaluation, Pain, Edema           History of Present Illness:     Grecia Galicia is a 61 y.o. female presents today with a 5-day history of an acute injury which patient states that she was walking at [employer] and lost her footing while attempting to restrain her dog.  The patient had a popping sensation and pain to the lateral ankle and presented to local [facility] which radiographs were obtained and the patient was placed in a boot.  Patient reports ill fitting of the boot and difficulty causing more pain so she has remove this.  She is currently ambulating causing pain and stiffness and swelling to the lateral ankle.  She presents today with radiographs for further evaluation and review.  She reports dull throbbing aching sensation lateral ankle progressing down into her foot.        Patient Active Problem List   Diagnosis   • Essential hypertension   • Pneumonia of both lower lobes due to infectious organism   • Dyslipidemia   • PVC's (premature ventricular contractions)   • Coronary artery disease due to lipid rich plaque     Past Medical History:   Diagnosis Date   • Atherosclerotic cardiovascular disease    • Dyslipidemia    • Hypertension    • Myocardial infarction (HCC)     WITH STENTING IN .    • PAD (peripheral artery disease) (HCC)      Past Surgical History:   Procedure Laterality Date   • CORONARY ANGIOPLASTY      ALSO STENTING x1, RCA    • FEMORAL FEMORAL BYPASS Right    • ILIAC ARTERY STENT       Social History     Occupational History   • Not on file   Tobacco Use   • Smoking status: Former     Packs/day: 2.00     Years: 40.00     Pack years: 80.00     Types: Cigarettes     Quit date:      Years since quittin.8    • Smokeless tobacco: Never   • Tobacco comments:     QUIT DEC 2020   Vaping Use   • Vaping Use: Never used   Substance and Sexual Activity   • Alcohol use: No   • Drug use: No   • Sexual activity: Defer    Grecia Galicia  reports that she quit smoking about 21 months ago. Her smoking use included cigarettes. She has a 80.00 pack-year smoking history. She has never used smokeless tobacco.. I have educated her on the risk of diseases from using tobacco products such as cancer, COPD and heart disease.        Social History     Social History Narrative   • Not on file     Family History   Problem Relation Age of Onset   • Heart attack Mother    • Heart disease Brother      Current Outpatient Medications   Medication Sig Dispense Refill   • albuterol (PROVENTIL HFA;VENTOLIN HFA) 108 (90 BASE) MCG/ACT inhaler Inhale 2 puffs every 4 (four) hours as needed for wheezing.     • amLODIPine (NORVASC) 5 MG tablet TAKE 1 TABLET BY MOUTH EVERY DAY 30 tablet 2   • aspirin 81 MG EC tablet Take 1 tablet by mouth Daily. 90 tablet 3   • atorvastatin (LIPITOR) 40 MG tablet Take 1 tablet by mouth Every Night. 30 tablet 5   • budesonide-formoterol (SYMBICORT) 160-4.5 MCG/ACT inhaler Inhale 2 puffs 2 (Two) Times a Day.     • carvedilol (COREG) 25 MG tablet Take 1 tablet by mouth 2 (Two) Times a Day With Meals. 60 tablet 5   • clopidogrel (PLAVIX) 75 MG tablet Take 1 tablet by mouth Daily. 30 tablet 5   • famotidine (Pepcid) 20 MG tablet Take 1 tablet by mouth 2 (Two) Times a Day. 60 tablet 3   • fluticasone (FLONASE) 50 MCG/ACT nasal spray 2 sprays into the nostril(s) as directed by provider Daily.     • loratadine (CLARITIN) 10 MG tablet Take 10 mg by mouth Every Night.     • metFORMIN (GLUCOPHAGE) 500 MG tablet Take 500 mg by mouth Daily. for blood sugar     • montelukast (SINGULAIR) 10 MG tablet TAKE ONE TABLET BY MOUTH EVERY EVENING FOR ALLERGIES     • nitroglycerin (NITROSTAT) 0.4 MG SL tablet Place 1 tablet under the tongue  "Every 5 (Five) Minutes As Needed for Chest Pain for up to 25 doses. 1tab q 5mins X3. 25 tablet 1   • Trelegy Ellipta 100-62.5-25 MCG/INH inhaler INHALE ONE PUFF BY MOUTH EVERY DAY FOR BREATHING       No current facility-administered medications for this visit.     No Known Allergies         Review of Systems   Constitutional: Negative.   HENT: Negative.    Eyes: Negative.    Cardiovascular: Negative.    Respiratory: Negative.    Endocrine: Negative.    Hematologic/Lymphatic: Negative.    Skin: Negative.    Musculoskeletal:        Pertinent positives listed in HPI   Gastrointestinal: Negative.    Genitourinary: Negative.    Neurological: Negative.    Psychiatric/Behavioral: Negative.    Allergic/Immunologic: Negative.          Objective      Vitals:    10/27/22 1414   BP: 142/78   Pulse: 81   Weight: 55.8 kg (123 lb)   Height: 162.6 cm (64\")      BMI is within normal parameters. No other follow-up for BMI required.      Physical Exam  Vitals and nursing note reviewed.   Constitutional:       General: She is not in acute distress.     Appearance: She is not ill-appearing.   HENT:      Head: Normocephalic and atraumatic.      Right Ear: External ear normal.      Left Ear: External ear normal.      Nose: Nose normal. No congestion or rhinorrhea.   Eyes:      Extraocular Movements: Extraocular movements intact.      Conjunctiva/sclera: Conjunctivae normal.      Pupils: Pupils are equal, round, and reactive to light.   Cardiovascular:      Rate and Rhythm: Normal rate.      Pulses: Normal pulses.   Pulmonary:      Effort: Pulmonary effort is normal. No respiratory distress.      Breath sounds: No stridor.   Abdominal:      General: There is no distension.   Musculoskeletal:      Cervical back: Normal range of motion.      Comments: Examination today of the patient's left ankle reveals mild generalized swelling and pain to palpation along the lateral malleolus.  There is ecchymosis progressing into the dorsal lateral " aspect of the foot into the webspaces of the toes.  Limited range of motion secondary to notable fracture.  Neurovascular status grossly intact left lower extremity.   Skin:     General: Skin is warm and dry.      Capillary Refill: Capillary refill takes less than 2 seconds.   Neurological:      General: No focal deficit present.      Mental Status: She is alert and oriented to person, place, and time.   Psychiatric:         Mood and Affect: Mood normal.         Behavior: Behavior normal.         Thought Content: Thought content normal.         Judgment: Judgment normal.                 Radiology:      Radiographs 3 views left ankle from outside source [facility] reveals questionable fracture involving the distal fibula noticed presently on oblique views.  No other acute fractures or dislocations noted.        Assessment/Plan        ICD-10-CM ICD-9-CM   1. Other closed fracture of distal end of left fibula, initial encounter  S82.832A 824.8       61-year-old female with a 5-day history of a left ankle injury which patient suffered a nondisplaced nonangulated distal fibular fracture.  Mortise remains intact based on radiographs.  Patient was adamant that she did not wish to proceed with equalizer boot immobilization.  She was provided today and placed in a well molded short leg fiberglass cast.  She was given a prescription for rolling walker.  She will return back in 1 week for repeat radiographs and alignment check.  Nonweightbearing status referenced.  Cast care instructions provided.                      This document was signed by Nico Hernandez PA-C October 27, 2022     CC: MonGaby england APRN

## 2022-10-28 DIAGNOSIS — S82.832A OTHER CLOSED FRACTURE OF DISTAL END OF LEFT FIBULA, INITIAL ENCOUNTER: Primary | ICD-10-CM

## 2022-10-28 RX ORDER — NAPROXEN 500 MG/1
500 TABLET ORAL 2 TIMES DAILY
Qty: 60 TABLET | Refills: 2 | Status: SHIPPED | OUTPATIENT
Start: 2022-10-28

## 2022-11-03 ENCOUNTER — HOSPITAL ENCOUNTER (OUTPATIENT)
Dept: GENERAL RADIOLOGY | Facility: HOSPITAL | Age: 62
Discharge: HOME | End: 2022-11-03
Admitting: PHYSICIAN ASSISTANT

## 2022-11-03 ENCOUNTER — OFFICE VISIT (OUTPATIENT)
Dept: ORTHOPEDIC SURGERY | Facility: CLINIC | Age: 62
End: 2022-11-03

## 2022-11-03 VITALS — HEIGHT: 64 IN | BODY MASS INDEX: 21 KG/M2 | WEIGHT: 123.02 LBS

## 2022-11-03 DIAGNOSIS — S82.832D OTHER CLOSED FRACTURE OF DISTAL END OF LEFT FIBULA WITH ROUTINE HEALING, SUBSEQUENT ENCOUNTER: Primary | ICD-10-CM

## 2022-11-03 DIAGNOSIS — M25.572 LEFT ANKLE PAIN, UNSPECIFIED CHRONICITY: ICD-10-CM

## 2022-11-03 PROCEDURE — 73610 X-RAY EXAM OF ANKLE: CPT

## 2022-11-03 PROCEDURE — 99024 POSTOP FOLLOW-UP VISIT: CPT | Performed by: PHYSICIAN ASSISTANT

## 2022-11-03 PROCEDURE — 73610 X-RAY EXAM OF ANKLE: CPT | Performed by: RADIOLOGY

## 2022-11-03 NOTE — PROGRESS NOTES
[facility] Established Patient Visit          Patient: Grecia Galicia  YOB: 1960  Date of Encounter: 11/3/2022  PCP: Gaby Shea APRN      Subjective     Chief Complaint   Patient presents with   • Left Ankle - Fracture, Follow-up     DOI 10/22/2022           History of Present Illness:     Grecia Galicia is a 62 y.o. female presents today with a two week history of an acute injury which patient states that she was walking at [employer] and lost her footing while attempting to restrain her dog.  The patient had a popping sensation and pain to the lateral ankle and presented to local [facility] which radiographs were obtained and the patient was placed in a boot.  Patient was found as having a distal fibula fracture nondisplaced nonangulated and was subsequently placed in a short leg fiberglass cast with walker for aid with ambulation and weightbearing restrictions.  She has been compliant with current restrictions and reports decreasing overall pain at rest.  She denies any other new complaints.  Notable paresthesias.       Patient Active Problem List   Diagnosis   • Essential hypertension   • Pneumonia of both lower lobes due to infectious organism   • Dyslipidemia   • PVC's (premature ventricular contractions)   • Coronary artery disease due to lipid rich plaque     Past Medical History:   Diagnosis Date   • Atherosclerotic cardiovascular disease    • Dyslipidemia    • Hypertension    • Myocardial infarction ([facility])     WITH STENTING IN 2007.    • PAD (peripheral artery disease) ([facility])      Past Surgical History:   Procedure Laterality Date   • CORONARY ANGIOPLASTY  2007    ALSO STENTING x1, RCA    • FEMORAL FEMORAL BYPASS Right    • ILIAC ARTERY STENT       Social History     Occupational History   • Not on file   Tobacco Use   • Smoking status: Former     Packs/day: 2.00     Years: 40.00     Pack years: 80.00     Types: Cigarettes     Quit date: 2021      Years since quittin.8   • Smokeless tobacco: Never   • Tobacco comments:     QUIT DEC 2020   Vaping Use   • Vaping Use: Never used   Substance and Sexual Activity   • Alcohol use: No   • Drug use: No   • Sexual activity: Defer    Grecia Galicia  reports that she quit smoking about 22 months ago. Her smoking use included cigarettes. She has a 80.00 pack-year smoking history. She has never used smokeless tobacco.. I have educated her on the risk of diseases from using tobacco products such as cancer, COPD and heart disease.        Social History     Social History Narrative   • Not on file     Family History   Problem Relation Age of Onset   • Heart attack Mother    • Heart disease Brother      Current Outpatient Medications   Medication Sig Dispense Refill   • albuterol (PROVENTIL HFA;VENTOLIN HFA) 108 (90 BASE) MCG/ACT inhaler Inhale 2 puffs every 4 (four) hours as needed for wheezing.     • amLODIPine (NORVASC) 5 MG tablet TAKE 1 TABLET BY MOUTH EVERY DAY 30 tablet 2   • aspirin 81 MG EC tablet Take 1 tablet by mouth Daily. 90 tablet 3   • atorvastatin (LIPITOR) 40 MG tablet Take 1 tablet by mouth Every Night. 30 tablet 5   • budesonide-formoterol (SYMBICORT) 160-4.5 MCG/ACT inhaler Inhale 2 puffs 2 (Two) Times a Day.     • carvedilol (COREG) 25 MG tablet Take 1 tablet by mouth 2 (Two) Times a Day With Meals. 60 tablet 5   • clopidogrel (PLAVIX) 75 MG tablet Take 1 tablet by mouth Daily. 30 tablet 5   • famotidine (Pepcid) 20 MG tablet Take 1 tablet by mouth 2 (Two) Times a Day. 60 tablet 3   • fluticasone (FLONASE) 50 MCG/ACT nasal spray 2 sprays into the nostril(s) as directed by provider Daily.     • loratadine (CLARITIN) 10 MG tablet Take 10 mg by mouth Every Night.     • metFORMIN (GLUCOPHAGE) 500 MG tablet Take 500 mg by mouth Daily. for blood sugar     • montelukast (SINGULAIR) 10 MG tablet TAKE ONE TABLET BY MOUTH EVERY EVENING FOR ALLERGIES     • naproxen (NAPROSYN) 500 MG tablet Take 1 tablet  "by mouth 2 (Two) Times a Day. 60 tablet 2   • nitroglycerin (NITROSTAT) 0.4 MG SL tablet Place 1 tablet under the tongue Every 5 (Five) Minutes As Needed for Chest Pain for up to 25 doses. 1tab q 5mins X3. 25 tablet 1   • Trelegy Ellipta 100-62.5-25 MCG/INH inhaler INHALE ONE PUFF BY MOUTH EVERY DAY FOR BREATHING       No current facility-administered medications for this visit.     No Known Allergies         Review of Systems   Constitutional: Negative.   HENT: Negative.    Eyes: Negative.    Cardiovascular: Negative.    Respiratory: Negative.    Endocrine: Negative.    Hematologic/Lymphatic: Negative.    Skin: Negative.    Musculoskeletal:        Pertinent positives listed in HPI   Gastrointestinal: Negative.    Genitourinary: Negative.    Neurological: Negative.    Psychiatric/Behavioral: Negative.    Allergic/Immunologic: Negative.          Objective      Vitals:    11/03/22 1102   Weight: 55.8 kg (123 lb 0.3 oz)   Height: 162.6 cm (64.02\")      BMI is within normal parameters. No other follow-up for BMI required.      Physical Exam  Vitals and nursing note reviewed.   Constitutional:       General: She is not in acute distress.     Appearance: She is not ill-appearing.   HENT:      Head: Normocephalic and atraumatic.      Right Ear: External ear normal.      Left Ear: External ear normal.      Nose: Nose normal. No congestion or rhinorrhea.   Eyes:      Extraocular Movements: Extraocular movements intact.      Conjunctiva/sclera: Conjunctivae normal.      Pupils: Pupils are equal, round, and reactive to light.   Cardiovascular:      Rate and Rhythm: Normal rate.      Pulses: Normal pulses.   Pulmonary:      Effort: Pulmonary effort is normal. No respiratory distress.      Breath sounds: No stridor.   Abdominal:      General: There is no distension.   Musculoskeletal:      Cervical back: Normal range of motion.      Comments: Left lower extremity on examination today reveals short leg fiberglass cast is intact.  " There is no significant skin or cast breakdown.  Good movement of digits.  Neurovascular status grossly intact left lower extremity.   Skin:     General: Skin is warm and dry.      Capillary Refill: Capillary refill takes less than 2 seconds.   Neurological:      General: No focal deficit present.      Mental Status: She is alert and oriented to person, place, and time.   Psychiatric:         Mood and Affect: Mood normal.         Behavior: Behavior normal.         Thought Content: Thought content normal.         Judgment: Judgment normal.                 Radiology:      Radiographs 3 views left ankle from outside source [facility] reveals questionable fracture involving the distal fibula noticed presently on oblique views.  No other acute fractures or dislocations noted.    XR Ankle 3+ View Left    Result Date: 11/3/2022    Overlying casting material obscures fine bony detail.  This report was finalized on 11/3/2022 10:57 AM by Dr. Ricky Reid MD.            Assessment/Plan        ICD-10-CM ICD-9-CM   1. Other closed fracture of distal end of left fibula with routine healing, subsequent encounter  S82.832D V54.16       61-year-old female with a two week history of a left ankle injury which patient suffered a nondisplaced nonangulated distal fibular fracture.  Mortise remains intact based on radiographs.  She has been compliant with the short leg fiberglass cast provided last office visit.  She has been implementing cast care instructions and maximum elevation.  She will continue with this as well as weightbearing restrictions and return back in 4 weeks for repeat radiographs out of cast and further evaluation.                       This document was signed by Nico Hernandez PA-C November 3, 2022     CC: MondayGaby APRN

## 2022-11-11 ENCOUNTER — PATIENT ROUNDING (BHMG ONLY) (OUTPATIENT)
Dept: ORTHOPEDIC SURGERY | Facility: CLINIC | Age: 62
End: 2022-11-11

## 2022-11-11 NOTE — PROGRESS NOTES
November 11, 2022    Hello, may I speak with Grecia Galicia?    My name is RYLIERadha Thomason,       I am  with [facility] [place]  [facility] [place]  [address]  [place] [address] [phone].    Before we get started may I verify your date of birth? 1960    I am calling to officially welcome you to our practice and ask about your recent visit. Is this a good time to talk? Yes    Tell me about your visit with us. What things went well?  Everything and everyone was great.        We're always looking for ways to make our patients' experiences even better. Do you have recommendations on ways we may improve?  No    Overall were you satisfied with your first visit to our practice? Yes       I appreciate you taking the time to speak with me today. Is there anything else I can do for you? No      Thank you, and have a great day.

## 2022-11-29 DIAGNOSIS — S82.832D OTHER CLOSED FRACTURE OF DISTAL END OF LEFT FIBULA WITH ROUTINE HEALING, SUBSEQUENT ENCOUNTER: Primary | ICD-10-CM

## 2022-12-01 ENCOUNTER — APPOINTMENT (OUTPATIENT)
Dept: GENERAL RADIOLOGY | Facility: HOSPITAL | Age: 62
End: 2022-12-01

## 2022-12-01 RX ORDER — FAMOTIDINE 20 MG/1
TABLET, FILM COATED ORAL
Qty: 60 TABLET | Refills: 5 | OUTPATIENT
Start: 2022-12-01

## 2022-12-05 ENCOUNTER — OFFICE VISIT (OUTPATIENT)
Dept: ORTHOPEDIC SURGERY | Facility: CLINIC | Age: 62
End: 2022-12-05

## 2022-12-05 ENCOUNTER — HOSPITAL ENCOUNTER (OUTPATIENT)
Dept: GENERAL RADIOLOGY | Facility: HOSPITAL | Age: 62
Discharge: HOME | End: 2022-12-05
Admitting: PHYSICIAN ASSISTANT

## 2022-12-05 VITALS — HEIGHT: 64 IN | BODY MASS INDEX: 21.17 KG/M2 | WEIGHT: 124 LBS

## 2022-12-05 DIAGNOSIS — S82.832D OTHER CLOSED FRACTURE OF DISTAL END OF LEFT FIBULA WITH ROUTINE HEALING, SUBSEQUENT ENCOUNTER: ICD-10-CM

## 2022-12-05 DIAGNOSIS — S82.832D OTHER CLOSED FRACTURE OF DISTAL END OF LEFT FIBULA WITH ROUTINE HEALING, SUBSEQUENT ENCOUNTER: Primary | ICD-10-CM

## 2022-12-05 PROCEDURE — 73610 X-RAY EXAM OF ANKLE: CPT | Performed by: RADIOLOGY

## 2022-12-05 PROCEDURE — 99024 POSTOP FOLLOW-UP VISIT: CPT | Performed by: PHYSICIAN ASSISTANT

## 2022-12-05 PROCEDURE — 73610 X-RAY EXAM OF ANKLE: CPT

## 2022-12-05 RX ORDER — LANCETS 30 GAUGE
EACH MISCELLANEOUS
COMMUNITY
Start: 2022-11-28

## 2022-12-05 RX ORDER — BLOOD SUGAR DIAGNOSTIC
STRIP MISCELLANEOUS
COMMUNITY
Start: 2022-11-28

## 2022-12-05 NOTE — PROGRESS NOTES
[facility] [place] [facility] Established Patient Visit          Patient: Grecia Galicia  YOB: 1960  Date of Encounter: 2022  PCP: MonGaby england APRN      Subjective     Chief Complaint   Patient presents with   • Left Ankle - Follow-up           History of Present Illness:     Grecia Galicia is a 62 y.o. female presents today with a 6 week history of an acute injury which patient states that she was walking at [place] and lost her footing while attempting to restrain her dog.  She has been treated for notable distal fibula fracture nondisplaced nonangulated and was subsequently placed in a short leg fiberglass cast with walker for aid with ambulation and weightbearing restrictions.  She has been compliant with current restrictions and reports decreasing overall pain at rest.  She denies any other new complaints. She presents for radiographs out of cast and further evaluation.     Patient Active Problem List   Diagnosis   • Essential hypertension   • Pneumonia of both lower lobes due to infectious organism   • Dyslipidemia   • PVC's (premature ventricular contractions)   • Coronary artery disease due to lipid rich plaque     Past Medical History:   Diagnosis Date   • Atherosclerotic cardiovascular disease    • Dyslipidemia    • Hypertension    • Myocardial infarction (HCC)     WITH STENTING IN .    • PAD (peripheral artery disease) (HCC)      Past Surgical History:   Procedure Laterality Date   • CORONARY ANGIOPLASTY      ALSO STENTING x1, RCA    • FEMORAL FEMORAL BYPASS Right    • ILIAC ARTERY STENT       Social History     Occupational History   • Not on file   Tobacco Use   • Smoking status: Former     Packs/day: 2.00     Years: 40.00     Pack years: 80.00     Types: Cigarettes     Quit date:      Years since quittin.9   • Smokeless tobacco: Never   • Tobacco comments:     QUIT DEC 2020   Vaping Use   • Vaping Use: Never used   Substance and Sexual  Activity   • Alcohol use: No   • Drug use: No   • Sexual activity: Defer    Grecia Galicia  reports that she quit smoking about 23 months ago. Her smoking use included cigarettes. She has a 80.00 pack-year smoking history. She has never used smokeless tobacco.. I have educated her on the risk of diseases from using tobacco products such as cancer, COPD and heart disease.        Social History     Social History Narrative   • Not on file     Family History   Problem Relation Age of Onset   • Heart attack Mother    • Heart disease Brother      Current Outpatient Medications   Medication Sig Dispense Refill   • albuterol (PROVENTIL HFA;VENTOLIN HFA) 108 (90 BASE) MCG/ACT inhaler Inhale 2 puffs every 4 (four) hours as needed for wheezing.     • amLODIPine (NORVASC) 5 MG tablet TAKE 1 TABLET BY MOUTH EVERY DAY 30 tablet 2   • aspirin 81 MG EC tablet Take 1 tablet by mouth Daily. 90 tablet 3   • atorvastatin (LIPITOR) 40 MG tablet Take 1 tablet by mouth Every Night. 30 tablet 5   • budesonide-formoterol (SYMBICORT) 160-4.5 MCG/ACT inhaler Inhale 2 puffs 2 (Two) Times a Day.     • carvedilol (COREG) 25 MG tablet Take 1 tablet by mouth 2 (Two) Times a Day With Meals. 60 tablet 5   • clopidogrel (PLAVIX) 75 MG tablet Take 1 tablet by mouth Daily. 30 tablet 5   • famotidine (Pepcid) 20 MG tablet Take 1 tablet by mouth 2 (Two) Times a Day. 60 tablet 3   • fluticasone (FLONASE) 50 MCG/ACT nasal spray 2 sprays into the nostril(s) as directed by provider Daily.     • loratadine (CLARITIN) 10 MG tablet Take 10 mg by mouth Every Night.     • metFORMIN (GLUCOPHAGE) 500 MG tablet Take 500 mg by mouth Daily. for blood sugar     • montelukast (SINGULAIR) 10 MG tablet TAKE ONE TABLET BY MOUTH EVERY EVENING FOR ALLERGIES     • naproxen (NAPROSYN) 500 MG tablet Take 1 tablet by mouth 2 (Two) Times a Day. 60 tablet 2   • nitroglycerin (NITROSTAT) 0.4 MG SL tablet Place 1 tablet under the tongue Every 5 (Five) Minutes As Needed for  "Chest Pain for up to 25 doses. 1tab q 5mins X3. 25 tablet 1   • Trelegy Ellipta 100-62.5-25 MCG/INH inhaler INHALE ONE PUFF BY MOUTH EVERY DAY FOR BREATHING     • Accu-Chek Sailaja Plus test strip CHECK BLOOD SUGAR MEDICARE PAYS FOR TESTING UP TO THREE TIMES DAILY FOR INSULIN DEPENDENT PATIENTS IF PRESCRIBED     • AquaLance Lancets 30G misc USE TO CHECK BLOOD SUGAR LEVEL THREE TIMES A DAY (MEDICARE PAYS FOR TESTING ONCE DAILY FOR NON-INSULIN DEPENDENT PATIENTS)       No current facility-administered medications for this visit.     No Known Allergies         Review of Systems   Constitutional: Negative.   HENT: Negative.    Eyes: Negative.    Cardiovascular: Negative.    Respiratory: Negative.    Endocrine: Negative.    Hematologic/Lymphatic: Negative.    Skin: Negative.    Musculoskeletal:        Pertinent positives listed in HPI   Gastrointestinal: Negative.    Genitourinary: Negative.    Neurological: Negative.    Psychiatric/Behavioral: Negative.    Allergic/Immunologic: Negative.          Objective      Vitals:    12/05/22 1013   Weight: 56.2 kg (124 lb)   Height: 162.6 cm (64.02\")      BMI is within normal parameters. No other follow-up for BMI required.      Physical Exam  Vitals and nursing note reviewed.   Constitutional:       General: She is not in acute distress.     Appearance: She is not ill-appearing.   HENT:      Head: Normocephalic and atraumatic.      Right Ear: External ear normal.      Left Ear: External ear normal.      Nose: Nose normal. No congestion or rhinorrhea.   Eyes:      Extraocular Movements: Extraocular movements intact.      Conjunctiva/sclera: Conjunctivae normal.      Pupils: Pupils are equal, round, and reactive to light.   Cardiovascular:      Rate and Rhythm: Normal rate.      Pulses: Normal pulses.   Pulmonary:      Effort: Pulmonary effort is normal. No respiratory distress.      Breath sounds: No stridor.   Abdominal:      General: There is no distension.   Musculoskeletal:      " Cervical back: Normal range of motion.      Comments: Left lower extremity on examination today reveals short leg fiberglass cast is removed.  There is no significant skin or cast breakdown.  Good movement of digits and ankle without any pain or complication.  Neurovascular status grossly intact left lower extremity.    Skin:     General: Skin is warm and dry.      Capillary Refill: Capillary refill takes less than 2 seconds.   Neurological:      General: No focal deficit present.      Mental Status: She is alert and oriented to person, place, and time.   Psychiatric:         Mood and Affect: Mood normal.         Behavior: Behavior normal.         Thought Content: Thought content normal.         Judgment: Judgment normal.                 Radiology:      Radiographs 3 views left ankle reveals subtle distal fibular fracture with no identifiable bony healing visible. This is involving the distal fibula noticed presently on oblique views.  No other acute fractures or dislocations noted.              Assessment/Plan        ICD-10-CM ICD-9-CM   1. Other closed fracture of distal end of left fibula with routine healing, subsequent encounter  S82.832D V54.16       62-year-old female with a 6 week history of a left ankle injury which patient suffered a nondisplaced nonangulated distal fibular fracture.  Mortise remains intact based on radiographs with no change in alignment. She will continue with weightbearing with usage of the eboot as needed.  She is expected to be weaned from the possible over the next 2 weeks.  Patient will return back in 4 weeks for further evaluation of efficacy of conservative treatment.  No direct surgical indication.                     This document was signed by Nico Hernandez PA-C December 5, 2022     CC: MondayGaby APRN

## 2022-12-27 RX ORDER — MONTELUKAST SODIUM 10 MG/1
TABLET ORAL
OUTPATIENT
Start: 2022-12-27

## 2022-12-27 NOTE — TELEPHONE ENCOUNTER
Caller: Grecia Galicia    Relationship: Self    Best call back number:[phone]    Requested Prescriptions:   Requested Prescriptions     Pending Prescriptions Disp Refills   • montelukast (SINGULAIR) 10 MG tablet          Pharmacy where request should be sent: [address] [phone] [facility] [phone] [facility]     Additional details provided by patient:  ENOUGH TO LAST 4 DAYS    Does the patient have less than a 3 day supply:  [] Yes  [x] No    Would you like a call back once the refill request has been completed: [x] Yes [] No    If the office needs to give you a call back, can they leave a voicemail: [x] Yes [] No    Arturo Go Rep   12/27/22 10:24 EST

## 2022-12-29 DIAGNOSIS — M25.572 LEFT ANKLE PAIN, UNSPECIFIED CHRONICITY: Primary | ICD-10-CM

## 2023-03-06 ENCOUNTER — OFFICE VISIT (OUTPATIENT)
Dept: CARDIOLOGY | Facility: CLINIC | Age: 63
End: 2023-03-06
Payer: MEDICARE

## 2023-03-06 VITALS
HEIGHT: 64 IN | WEIGHT: 129.8 LBS | HEART RATE: 95 BPM | BODY MASS INDEX: 22.16 KG/M2 | DIASTOLIC BLOOD PRESSURE: 79 MMHG | OXYGEN SATURATION: 93 % | SYSTOLIC BLOOD PRESSURE: 146 MMHG

## 2023-03-06 DIAGNOSIS — I10 ESSENTIAL HYPERTENSION: Primary | ICD-10-CM

## 2023-03-06 DIAGNOSIS — E78.5 DYSLIPIDEMIA: ICD-10-CM

## 2023-03-06 DIAGNOSIS — I25.10 ASCVD (ARTERIOSCLEROTIC CARDIOVASCULAR DISEASE): ICD-10-CM

## 2023-03-06 PROCEDURE — 93000 ELECTROCARDIOGRAM COMPLETE: CPT | Performed by: NURSE PRACTITIONER

## 2023-03-06 PROCEDURE — 99214 OFFICE O/P EST MOD 30 MIN: CPT | Performed by: NURSE PRACTITIONER

## 2023-03-06 RX ORDER — CLOPIDOGREL BISULFATE 75 MG/1
75 TABLET ORAL DAILY
Qty: 30 TABLET | Refills: 5 | Status: SHIPPED | OUTPATIENT
Start: 2023-03-06

## 2023-03-06 RX ORDER — CARVEDILOL 25 MG/1
25 TABLET ORAL 2 TIMES DAILY WITH MEALS
Qty: 60 TABLET | Refills: 5 | Status: SHIPPED | OUTPATIENT
Start: 2023-03-06

## 2023-03-06 RX ORDER — ATORVASTATIN CALCIUM 40 MG/1
40 TABLET, FILM COATED ORAL NIGHTLY
Qty: 30 TABLET | Refills: 5 | Status: SHIPPED | OUTPATIENT
Start: 2023-03-06

## 2023-03-06 NOTE — PROGRESS NOTES
[facility]             BUDDY Rios         Monday, Gaby Jonas, APRN  Grecia Galicia  1960 03/06/2023    Patient Active Problem List   Diagnosis   • Essential hypertension   • Pneumonia of both lower lobes due to infectious organism   • Dyslipidemia   • PVC's (premature ventricular contractions)   • ASCVD (arteriosclerotic cardiovascular disease)       Dear Monday, Gaby Jonas, APRN:    Subjective     Chief Complaint   Patient presents with   • Follow-up     6 month       HPI:     This is a 62 y.o. female with known past medical history of acute MI with subsequent PCI in 2005, peripheral arterial disease status post intervention to the lower extremities, essential pretension, dyslipidemia and tobacco abuse.    Grecia Galicia presents today for routine cardiology follow up.  Patient states since her last visit she has been doing overall well.  Denies any chest pain or shortness of breath.  Blood pressure mildly elevated in the office today but has been overall stable.  Reports she is to have lab work by her PCP coming up in the next month.  Reports compliance with her medications    • Diagnostic Testing  1. Echocardiogram 11/2017: EF 56 to 60%  2. Nuclear stress test 3/2019: No evidence of ischemia     All other systems were reviewed and were negative.    Patient Active Problem List   Diagnosis   • Essential hypertension   • Pneumonia of both lower lobes due to infectious organism   • Dyslipidemia   • PVC's (premature ventricular contractions)   • ASCVD (arteriosclerotic cardiovascular disease)       family history includes Heart attack in her mother; Heart disease in her brother.     reports that she quit smoking about 2 years ago. Her smoking use included cigarettes. She has a 80.00 pack-year smoking history. She has never used smokeless tobacco. She reports that she does not drink alcohol and does not use drugs.    No Known Allergies      Current  Outpatient Medications:   •  Accu-Chek Sailaja Plus test strip, CHECK BLOOD SUGAR MEDICARE PAYS FOR TESTING UP TO THREE TIMES DAILY FOR INSULIN DEPENDENT PATIENTS IF PRESCRIBED, Disp: , Rfl:   •  albuterol (PROVENTIL HFA;VENTOLIN HFA) 108 (90 BASE) MCG/ACT inhaler, Inhale 2 puffs Every 4 (Four) Hours As Needed for Wheezing., Disp: , Rfl:   •  AquaLance Lancets 30G misc, USE TO CHECK BLOOD SUGAR LEVEL THREE TIMES A DAY (MEDICARE PAYS FOR TESTING ONCE DAILY FOR NON-INSULIN DEPENDENT PATIENTS), Disp: , Rfl:   •  aspirin 81 MG EC tablet, Take 1 tablet by mouth Daily., Disp: 90 tablet, Rfl: 3  •  atorvastatin (LIPITOR) 40 MG tablet, Take 1 tablet by mouth Every Night., Disp: 30 tablet, Rfl: 5  •  carvedilol (COREG) 25 MG tablet, Take 1 tablet by mouth 2 (Two) Times a Day With Meals., Disp: 60 tablet, Rfl: 5  •  clopidogrel (PLAVIX) 75 MG tablet, Take 1 tablet by mouth Daily., Disp: 30 tablet, Rfl: 5  •  famotidine (Pepcid) 20 MG tablet, Take 1 tablet by mouth 2 (Two) Times a Day., Disp: 60 tablet, Rfl: 3  •  loratadine (CLARITIN) 10 MG tablet, Take 1 tablet by mouth Every Night., Disp: , Rfl:   •  metFORMIN (GLUCOPHAGE) 500 MG tablet, Take 1 tablet by mouth Daily. for blood sugar, Disp: , Rfl:   •  montelukast (SINGULAIR) 10 MG tablet, TAKE ONE TABLET BY MOUTH EVERY EVENING FOR ALLERGIES, Disp: , Rfl:   •  nitroglycerin (NITROSTAT) 0.4 MG SL tablet, Place 1 tablet under the tongue Every 5 (Five) Minutes As Needed for Chest Pain for up to 25 doses. 1tab q 5mins X3., Disp: 25 tablet, Rfl: 1  •  Trelegy Ellipta 100-62.5-25 MCG/INH inhaler, INHALE ONE PUFF BY MOUTH EVERY DAY FOR BREATHING, Disp: , Rfl:   •  budesonide-formoterol (SYMBICORT) 160-4.5 MCG/ACT inhaler, Inhale 2 puffs 2 (Two) Times a Day., Disp: , Rfl:   •  fluticasone (FLONASE) 50 MCG/ACT nasal spray, 2 sprays into the nostril(s) as directed by provider Daily., Disp: , Rfl:   •  naproxen (NAPROSYN) 500 MG tablet, Take 1 tablet by mouth 2 (Two) Times a Day., Disp:  60 tablet, Rfl: 2      Physical Exam:  I have reviewed the patient's current vital signs as documented in the patient's EMR.   Vitals:    03/06/23 0941   BP: 146/79   Pulse: 95   SpO2: 93%     Body mass index is 22.27 kg/m².       03/06/23  0941   Weight: 58.9 kg (129 lb 12.8 oz)      Physical Exam  Constitutional:       General: She is not in acute distress.     Appearance: Normal appearance. She is well-developed and normal weight.   HENT:      Head: Normocephalic and atraumatic.   Eyes:      General: Lids are normal.      Conjunctiva/sclera: Conjunctivae normal.      Pupils: Pupils are equal, round, and reactive to light.   Neck:      Vascular: No carotid bruit or JVD.   Cardiovascular:      Rate and Rhythm: Normal rate and regular rhythm.      Pulses: Normal pulses.      Heart sounds: Normal heart sounds, S1 normal and S2 normal. No murmur heard.  Pulmonary:      Effort: Pulmonary effort is normal. No respiratory distress.      Breath sounds: Normal breath sounds. No wheezing.   Abdominal:      General: Bowel sounds are normal. There is no distension.      Palpations: Abdomen is soft. There is no hepatomegaly or splenomegaly.      Tenderness: There is no abdominal tenderness.   Musculoskeletal:         General: No swelling. Normal range of motion.      Cervical back: Normal range of motion and neck supple.      Right lower leg: No edema.      Left lower leg: No edema.   Skin:     General: Skin is warm and dry.      Coloration: Skin is not jaundiced.      Findings: No rash.   Neurological:      General: No focal deficit present.      Mental Status: She is alert and oriented to person, place, and time. Mental status is at baseline.   Psychiatric:         Mood and Affect: Mood normal.         Speech: Speech normal.         Behavior: Behavior normal.         Thought Content: Thought content normal.         Judgment: Judgment normal.            DATA REVIEWED:     TTE/SAM:  Results for orders placed during the  hospital encounter of 11/01/17    Adult Transthoracic Echo Complete W/ Cont if Necessary Per Protocol    Interpretation Summary  · Estimated EF appears to be in the range of 56 - 60%. Normal left ventricular cavity size and wall thickness noted. All left ventricular wall segments contract normally.  · No aortic stenosis  · Trace mitral valve regurgitation  · Trace tricuspid valve regurgitation  · There is no evidence of pericardial effusion.    Echo study dated 10/09/2014, reports similar EF of 55-60%      Laboratory evaluations:    Lab Results   Component Value Date    GLUCOSE 93 02/16/2022    BUN 21 02/16/2022    CREATININE 0.71 02/16/2022    EGFRIFNONA 84 02/16/2022    BCR 29.6 (H) 02/16/2022    K 4.2 02/16/2022    CO2 24.1 02/16/2022    CALCIUM 9.9 02/16/2022    ALBUMIN 4.70 02/16/2022    AST 15 02/16/2022    ALT 30 02/16/2022     Lab Results   Component Value Date    WBC 10.70 02/16/2022    HGB 13.8 02/16/2022    HCT 41.2 02/16/2022    MCV 94.9 02/16/2022     02/16/2022     Lab Results   Component Value Date    CHOL 158 02/16/2022    TRIG 83 02/16/2022    HDL 50 02/16/2022    LDL 92 02/16/2022     Lab Results   Component Value Date    TSH 1.060 02/16/2022     Lab Results   Component Value Date    HGBA1C 5.90 (H) 02/16/2022     Lab Results   Component Value Date    ALT 30 02/16/2022     Lab Results   Component Value Date    HGBA1C 5.90 (H) 02/16/2022    HGBA1C 6.01 (H) 03/10/2021    HGBA1C 5.80 (H) 01/04/2019     Lab Results   Component Value Date    CREATININE 0.71 02/16/2022     No results found for: IRON, TIBC, FERRITIN  Lab Results   Component Value Date    INR 1.00 12/14/2014    PROTIME 10.7 12/14/2014             ECG 12 Lead    Date/Time: 3/6/2023 10:04 AM  Performed by: Farida Adorno APRN  Authorized by: Farida Adorno APRN   Comparison: compared with previous ECG   Similar to previous ECG  Rhythm: sinus rhythm    Clinical impression: non-specific  ECG          --------------------------------------------------------------------------------------------------------------------------    ASSESSMENT/PLAN:      Diagnosis Plan   1. Essential hypertension  carvedilol (COREG) 25 MG tablet      2. Dyslipidemia  atorvastatin (LIPITOR) 40 MG tablet      3. ASCVD (arteriosclerotic cardiovascular disease)  ECG 12 Lead    ECG 12 Lead    clopidogrel (PLAVIX) 75 MG tablet          1. Essential hypertension  • Blood pressure mildly elevated in the office today but has been overall controlled.  Continue with Coreg.  I discussed with her about low-sodium diet.    2. Dyslipidemia  • Patient reports labs to be done by her PCP.  I have asked her to fax results to our office.  Adjust statin as needed.    3.  ASCVD  · Denies any chest pain.  Continue with medical management with aspirin, statin, Coreg and Plavix.    This document has been @Electronically signed by BUDDY Rios, 03/06/23, 9:01 AM EST.       Dictated Utilizing Dragon Dictation: Part of this note may be an electronic transcription/translation of spoken language to printed text using the Dragon Dictation System.    Follow-up appointment and medication changes provided in hand delivered After Visit Summary as well as reviewed in the room.

## 2023-08-29 DIAGNOSIS — I25.10 ASCVD (ARTERIOSCLEROTIC CARDIOVASCULAR DISEASE): ICD-10-CM

## 2023-08-29 RX ORDER — CLOPIDOGREL BISULFATE 75 MG/1
75 TABLET ORAL DAILY
Qty: 30 TABLET | Refills: 5 | Status: SHIPPED | OUTPATIENT
Start: 2023-08-29

## 2023-09-20 ENCOUNTER — TELEPHONE (OUTPATIENT)
Dept: CARDIOLOGY | Facility: CLINIC | Age: 63
End: 2023-09-20
Payer: MEDICARE

## 2023-09-20 ENCOUNTER — OFFICE VISIT (OUTPATIENT)
Dept: CARDIOLOGY | Facility: CLINIC | Age: 63
End: 2023-09-20
Payer: MEDICARE

## 2023-09-20 VITALS
DIASTOLIC BLOOD PRESSURE: 63 MMHG | HEART RATE: 84 BPM | SYSTOLIC BLOOD PRESSURE: 132 MMHG | HEIGHT: 64 IN | WEIGHT: 135 LBS | OXYGEN SATURATION: 92 % | BODY MASS INDEX: 23.05 KG/M2

## 2023-09-20 DIAGNOSIS — I10 ESSENTIAL HYPERTENSION: ICD-10-CM

## 2023-09-20 DIAGNOSIS — E78.5 DYSLIPIDEMIA: Primary | ICD-10-CM

## 2023-09-20 DIAGNOSIS — I25.10 ASCVD (ARTERIOSCLEROTIC CARDIOVASCULAR DISEASE): ICD-10-CM

## 2023-09-20 PROCEDURE — 1159F MED LIST DOCD IN RCRD: CPT | Performed by: NURSE PRACTITIONER

## 2023-09-20 PROCEDURE — 93000 ELECTROCARDIOGRAM COMPLETE: CPT | Performed by: NURSE PRACTITIONER

## 2023-09-20 PROCEDURE — 3078F DIAST BP <80 MM HG: CPT | Performed by: NURSE PRACTITIONER

## 2023-09-20 PROCEDURE — 1160F RVW MEDS BY RX/DR IN RCRD: CPT | Performed by: NURSE PRACTITIONER

## 2023-09-20 PROCEDURE — 99214 OFFICE O/P EST MOD 30 MIN: CPT | Performed by: NURSE PRACTITIONER

## 2023-09-20 PROCEDURE — 3075F SYST BP GE 130 - 139MM HG: CPT | Performed by: NURSE PRACTITIONER

## 2023-09-20 NOTE — TELEPHONE ENCOUNTER
----- Message from BUDDY Oconnor sent at 9/20/2023 11:38 AM EDT -----  Please request labs from PCP

## 2023-09-20 NOTE — PROGRESS NOTES
[facility]             BUDDY Rios         Monday, Gaby Jonas, APRN  Grecia Galicia  1960 09/20/2023    Patient Active Problem List   Diagnosis    Essential hypertension    Pneumonia of both lower lobes due to infectious organism    Dyslipidemia    PVC's (premature ventricular contractions)    ASCVD (arteriosclerotic cardiovascular disease)       Dear Monday, Gaby Jonas, APRN:    Subjective     Chief Complaint   Patient presents with    Med Management     Verbal no med changes.        HPI:     This is a 62 y.o. female with known past medical history of acute MI with subsequent PCI in 2005, peripheral arterial disease status post intervention to the lower extremities, essential pretension, dyslipidemia and tobacco abuse.    Grecia Galicia presents today for routine cardiology follow up.  Patient states has been doing well since her last visit.  Denies any chest pain, shortness of breath or palpitations.  Reports recent labs by her PCP which are not available for review today.  Blood pressure well controlled.    Diagnostic Testing  Echocardiogram 11/2017: EF 56 to 60%  Nuclear stress test 3/2019: No evidence of ischemia     All other systems were reviewed and were negative.    Patient Active Problem List   Diagnosis    Essential hypertension    Pneumonia of both lower lobes due to infectious organism    Dyslipidemia    PVC's (premature ventricular contractions)    ASCVD (arteriosclerotic cardiovascular disease)       family history includes Heart attack in her mother; Heart disease in her brother.     reports that she quit smoking about 2 years ago. Her smoking use included cigarettes. She has a 80.00 pack-year smoking history. She has never used smokeless tobacco. She reports that she does not drink alcohol and does not use drugs.    No Known Allergies      Current Outpatient Medications:     Accu-Chek Sailaja Plus test strip, CHECK BLOOD SUGAR  MEDICARE PAYS FOR TESTING UP TO THREE TIMES DAILY FOR INSULIN DEPENDENT PATIENTS IF PRESCRIBED, Disp: , Rfl:     albuterol (PROVENTIL HFA;VENTOLIN HFA) 108 (90 BASE) MCG/ACT inhaler, Inhale 2 puffs Every 4 (Four) Hours As Needed for Wheezing., Disp: , Rfl:     AquaLance Lancets 30G misc, USE TO CHECK BLOOD SUGAR LEVEL THREE TIMES A DAY (MEDICARE PAYS FOR TESTING ONCE DAILY FOR NON-INSULIN DEPENDENT PATIENTS), Disp: , Rfl:     aspirin 81 MG EC tablet, Take 1 tablet by mouth Daily., Disp: 90 tablet, Rfl: 3    atorvastatin (LIPITOR) 40 MG tablet, Take 1 tablet by mouth Every Night., Disp: 30 tablet, Rfl: 5    budesonide-formoterol (SYMBICORT) 160-4.5 MCG/ACT inhaler, Inhale 2 puffs 2 (Two) Times a Day., Disp: , Rfl:     carvedilol (COREG) 25 MG tablet, Take 1 tablet by mouth 2 (Two) Times a Day With Meals., Disp: 60 tablet, Rfl: 5    clopidogrel (PLAVIX) 75 MG tablet, Take 1 tablet by mouth Daily., Disp: 30 tablet, Rfl: 5    famotidine (Pepcid) 20 MG tablet, Take 1 tablet by mouth 2 (Two) Times a Day., Disp: 60 tablet, Rfl: 3    fluticasone (FLONASE) 50 MCG/ACT nasal spray, 2 sprays into the nostril(s) as directed by provider Daily., Disp: , Rfl:     loratadine (CLARITIN) 10 MG tablet, Take 1 tablet by mouth Every Night., Disp: , Rfl:     metFORMIN (GLUCOPHAGE) 500 MG tablet, Take 1 tablet by mouth Daily. for blood sugar, Disp: , Rfl:     montelukast (SINGULAIR) 10 MG tablet, TAKE ONE TABLET BY MOUTH EVERY EVENING FOR ALLERGIES, Disp: , Rfl:     naproxen (NAPROSYN) 500 MG tablet, Take 1 tablet by mouth 2 (Two) Times a Day., Disp: 60 tablet, Rfl: 2    nitroglycerin (NITROSTAT) 0.4 MG SL tablet, Place 1 tablet under the tongue Every 5 (Five) Minutes As Needed for Chest Pain for up to 25 doses. 1tab q 5mins X3., Disp: 25 tablet, Rfl: 1    Trelegy Ellipta 100-62.5-25 MCG/INH inhaler, INHALE ONE PUFF BY MOUTH EVERY DAY FOR BREATHING, Disp: , Rfl:       Physical Exam:  I have reviewed the patient's current vital signs as  documented in the patient's EMR.   Vitals:    09/20/23 1129   BP: 132/63   Pulse: 84   SpO2: 92%     Body mass index is 23.17 kg/m².       09/20/23  1129   Weight: 61.2 kg (135 lb)      Physical Exam  Constitutional:       General: She is not in acute distress.     Appearance: Normal appearance. She is well-developed and normal weight.   HENT:      Head: Normocephalic and atraumatic.   Eyes:      General: Lids are normal.      Conjunctiva/sclera: Conjunctivae normal.      Pupils: Pupils are equal, round, and reactive to light.   Neck:      Vascular: No carotid bruit or JVD.   Cardiovascular:      Rate and Rhythm: Normal rate and regular rhythm.      Pulses: Normal pulses.      Heart sounds: Normal heart sounds, S1 normal and S2 normal. No murmur heard.  Pulmonary:      Effort: Pulmonary effort is normal. No respiratory distress.      Breath sounds: Normal breath sounds. No wheezing.   Abdominal:      General: Bowel sounds are normal. There is no distension.      Palpations: Abdomen is soft. There is no hepatomegaly or splenomegaly.      Tenderness: There is no abdominal tenderness.   Musculoskeletal:         General: No swelling. Normal range of motion.      Cervical back: Normal range of motion and neck supple.      Right lower leg: No edema.      Left lower leg: No edema.   Skin:     General: Skin is warm and dry.      Coloration: Skin is not jaundiced.      Findings: No rash.   Neurological:      General: No focal deficit present.      Mental Status: She is alert and oriented to person, place, and time. Mental status is at baseline.   Psychiatric:         Mood and Affect: Mood normal.         Speech: Speech normal.         Behavior: Behavior normal.         Thought Content: Thought content normal.         Judgment: Judgment normal.          DATA REVIEWED:     TTE/SAM:  Results for orders placed during the hospital encounter of 11/01/17    Adult Transthoracic Echo Complete W/ Cont if Necessary Per  Protocol    Interpretation Summary  · Estimated EF appears to be in the range of 56 - 60%. Normal left ventricular cavity size and wall thickness noted. All left ventricular wall segments contract normally.  · No aortic stenosis  · Trace mitral valve regurgitation  · Trace tricuspid valve regurgitation  · There is no evidence of pericardial effusion.    Echo study dated 10/09/2014, reports similar EF of 55-60%      Laboratory evaluations:    Lab Results   Component Value Date    GLUCOSE 93 02/16/2022    BUN 21 02/16/2022    CREATININE 0.71 02/16/2022    EGFRIFNONA 84 02/16/2022    BCR 29.6 (H) 02/16/2022    K 4.2 02/16/2022    CO2 24.1 02/16/2022    CALCIUM 9.9 02/16/2022    ALBUMIN 4.70 02/16/2022    AST 15 02/16/2022    ALT 30 02/16/2022     Lab Results   Component Value Date    WBC 10.70 02/16/2022    HGB 13.8 02/16/2022    HCT 41.2 02/16/2022    MCV 94.9 02/16/2022     02/16/2022     Lab Results   Component Value Date    CHOL 158 02/16/2022    TRIG 83 02/16/2022    HDL 50 02/16/2022    LDL 92 02/16/2022     Lab Results   Component Value Date    TSH 1.060 02/16/2022     Lab Results   Component Value Date    HGBA1C 5.90 (H) 02/16/2022     Lab Results   Component Value Date    ALT 30 02/16/2022     Lab Results   Component Value Date    HGBA1C 5.90 (H) 02/16/2022    HGBA1C 6.01 (H) 03/10/2021    HGBA1C 5.80 (H) 01/04/2019     Lab Results   Component Value Date    CREATININE 0.71 02/16/2022     No results found for: IRON, TIBC, FERRITIN  Lab Results   Component Value Date    INR 1.00 12/14/2014    PROTIME 10.7 12/14/2014             ECG 12 Lead    Date/Time: 9/20/2023 12:00 PM  Performed by: Farida Adorno APRN  Authorized by: Farida Adorno APRN   Comparison: compared with previous ECG   Rhythm: sinus rhythm  Other findings: non-specific ST-T wave changes    Clinical impression: non-specific ECG        --------------------------------------------------------------------------------------------------------------------------    ASSESSMENT/PLAN:      Diagnosis Plan   1. Dyslipidemia        2. Essential hypertension        3. ASCVD (arteriosclerotic cardiovascular disease)            Essential hypertension  Blood pressure controlled.  Labs request from PCP.  Continue current management.    Dyslipidemia  Labs requested by PCP.  Adjust statin as needed.    3.  ASCVD  Denies any chest pain.  Continue with medical management with aspirin, statin, Coreg and Plavix.      This document has been @Electronically signed by BUDDY Rios, 09/20/23, 9:38 AM EDT.       Dictated Utilizing Dragon Dictation: Part of this note may be an electronic transcription/translation of spoken language to printed text using the Dragon Dictation System.    Follow-up appointment and medication changes provided in hand delivered After Visit Summary as well as reviewed in the room.

## 2023-09-21 DIAGNOSIS — E78.5 DYSLIPIDEMIA: Primary | ICD-10-CM

## 2023-09-21 RX ORDER — ATORVASTATIN CALCIUM 80 MG/1
80 TABLET, FILM COATED ORAL DAILY
Qty: 30 TABLET | Refills: 11 | Status: SHIPPED | OUTPATIENT
Start: 2023-09-21

## 2023-09-25 ENCOUNTER — TELEPHONE (OUTPATIENT)
Dept: CARDIOLOGY | Facility: CLINIC | Age: 63
End: 2023-09-25

## 2023-09-25 NOTE — TELEPHONE ENCOUNTER
/[facility] received a fax that requires your attention. The document has been indexed to the patient’s chart for your review.      Reason for sending: EXTERNAL MEDICAL RECORD NOTIFICATION    Documents Description: LAB RESULTS    Name of Sender: [employer]    Date Indexed: 9/25/23    Notes (if needed): SEE FAX IN CHART UNDER LABS

## 2024-02-27 ENCOUNTER — TELEPHONE (OUTPATIENT)
Dept: CARDIOLOGY | Facility: CLINIC | Age: 64
End: 2024-02-27
Payer: MEDICARE

## 2024-02-28 DIAGNOSIS — I25.10 ASCVD (ARTERIOSCLEROTIC CARDIOVASCULAR DISEASE): Primary | ICD-10-CM

## 2024-03-01 DIAGNOSIS — I25.10 ASCVD (ARTERIOSCLEROTIC CARDIOVASCULAR DISEASE): ICD-10-CM

## 2024-03-01 RX ORDER — CLOPIDOGREL BISULFATE 75 MG/1
75 TABLET ORAL DAILY
Qty: 30 TABLET | Refills: 5 | Status: SHIPPED | OUTPATIENT
Start: 2024-03-01

## 2024-04-02 DIAGNOSIS — I25.10 ASCVD (ARTERIOSCLEROTIC CARDIOVASCULAR DISEASE): ICD-10-CM

## 2024-04-02 RX ORDER — CLOPIDOGREL BISULFATE 75 MG/1
75 TABLET ORAL DAILY
Qty: 30 TABLET | Refills: 0 | Status: SHIPPED | OUTPATIENT
Start: 2024-04-02

## 2024-04-16 ENCOUNTER — TELEPHONE (OUTPATIENT)
Dept: CARDIOLOGY | Facility: CLINIC | Age: 64
End: 2024-04-16

## 2024-04-16 NOTE — TELEPHONE ENCOUNTER
Caller: Grecia Galicia    Relationship: Self    Best call back number: [phone]     What is the best time to reach you: ANY    What was the call regarding: WAS BEING REF TO A DIFFERENT CARD IN [place] DUE TO [facility] NO LONGER TAKING HER INSURANCE, HAS NOT HEARD ANYTHING FROM REF. PLEASE ADVISE.      Is it okay if the provider responds through MyChart: CALL

## 2024-10-03 DIAGNOSIS — I25.10 ASCVD (ARTERIOSCLEROTIC CARDIOVASCULAR DISEASE): ICD-10-CM

## 2024-10-04 NOTE — TELEPHONE ENCOUNTER
Patient aware that we can no longer send her meds since she is no longer our patient and she will need to get it from her PCP or another cardio-we referred her to [facility] earlier in the year

## 2024-10-07 RX ORDER — CLOPIDOGREL BISULFATE 75 MG/1
TABLET ORAL
Qty: 30 TABLET | Refills: 0 | OUTPATIENT
Start: 2024-10-07

## 2024-10-22 ENCOUNTER — OFFICE VISIT (OUTPATIENT)
Dept: CARDIOLOGY | Facility: CLINIC | Age: 64
End: 2024-10-22
Payer: MEDICARE

## 2024-10-22 VITALS
WEIGHT: 134 LBS | DIASTOLIC BLOOD PRESSURE: 77 MMHG | SYSTOLIC BLOOD PRESSURE: 154 MMHG | HEIGHT: 64 IN | BODY MASS INDEX: 22.88 KG/M2 | OXYGEN SATURATION: 90 % | HEART RATE: 76 BPM

## 2024-10-22 DIAGNOSIS — I73.9 PAD (PERIPHERAL ARTERY DISEASE): ICD-10-CM

## 2024-10-22 DIAGNOSIS — I10 ESSENTIAL HYPERTENSION: ICD-10-CM

## 2024-10-22 DIAGNOSIS — I25.10 ASCVD (ARTERIOSCLEROTIC CARDIOVASCULAR DISEASE): Primary | ICD-10-CM

## 2024-10-22 PROCEDURE — 3078F DIAST BP <80 MM HG: CPT | Performed by: NURSE PRACTITIONER

## 2024-10-22 PROCEDURE — 93000 ELECTROCARDIOGRAM COMPLETE: CPT | Performed by: NURSE PRACTITIONER

## 2024-10-22 PROCEDURE — 1160F RVW MEDS BY RX/DR IN RCRD: CPT | Performed by: NURSE PRACTITIONER

## 2024-10-22 PROCEDURE — 3077F SYST BP >= 140 MM HG: CPT | Performed by: NURSE PRACTITIONER

## 2024-10-22 PROCEDURE — 1159F MED LIST DOCD IN RCRD: CPT | Performed by: NURSE PRACTITIONER

## 2024-10-22 PROCEDURE — 99214 OFFICE O/P EST MOD 30 MIN: CPT | Performed by: NURSE PRACTITIONER

## 2024-10-22 RX ORDER — AMLODIPINE BESYLATE 5 MG/1
5 TABLET ORAL DAILY
Qty: 30 TABLET | Refills: 3 | Status: SHIPPED | OUTPATIENT
Start: 2024-10-22

## 2024-10-22 RX ORDER — CARVEDILOL 25 MG/1
25 TABLET ORAL 2 TIMES DAILY WITH MEALS
Qty: 60 TABLET | Refills: 5 | Status: SHIPPED | OUTPATIENT
Start: 2024-10-22 | End: 2024-10-24

## 2024-10-22 RX ORDER — ALENDRONATE SODIUM 70 MG/1
70 TABLET ORAL
COMMUNITY

## 2024-10-22 RX ORDER — CLOPIDOGREL BISULFATE 75 MG/1
75 TABLET ORAL DAILY
Qty: 30 TABLET | Refills: 3 | Status: SHIPPED | OUTPATIENT
Start: 2024-10-22 | End: 2024-10-25 | Stop reason: SDUPTHER

## 2024-10-22 NOTE — PROGRESS NOTES
[facility]             BUDDY Rios         Monday, Gaby Jonas, APRN  Grecia Galicia  1960  10/22/2024    Patient Active Problem List   Diagnosis    Essential hypertension    Pneumonia of both lower lobes due to infectious organism    Dyslipidemia    PVC's (premature ventricular contractions)    ASCVD (arteriosclerotic cardiovascular disease)       Dear Monday, Gaby Jonas, APRN:    Subjective     Chief Complaint   Patient presents with    Follow-up       HPI:     This is a 63 y.o. female with known past medical history of acute MI with subsequent PCI in 2005, peripheral arterial disease status post intervention to the lower extremities, essential pretension, dyslipidemia and tobacco abuse.     Grecia Galicia presents today for routine cardiology follow up.  Patient has not been seen in our office for around 1 year due to insurance coverage.  She was following with [facility].  She recently saw them in July 2024 for which a CT with runoff was performed along with an echocardiogram.  Echocardiogram showed normal LV function.  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  She was referred to interventional cardiology who planned to proceed with revascularization as stenting of the left common iliac artery by radial approach.  Patient states she is unable to make it to [place] therefore she cannot have this done.  Continues to have intermittent leg pain when she walks.  Denies any chest pain.  Reports she is to have labs by PCP on 1 November.  Blood pressure elevated in the office today and states this is what it does at home    Diagnostic Testing  Echocardiogram 11/2017: EF 56 to 60%  Nuclear stress test 3/2019: No evidence of ischemia     All other systems were reviewed and were negative.    Patient Active Problem List    Diagnosis    Essential hypertension    Pneumonia of both lower lobes due to infectious organism    Dyslipidemia    PVC's (premature ventricular contractions)    ASCVD (arteriosclerotic cardiovascular disease)       family history includes Heart attack in her mother; Heart disease in her brother.     reports that she quit smoking about 3 years ago. Her smoking use included cigarettes. She started smoking about 43 years ago. She has a 80 pack-year smoking history. She has never used smokeless tobacco. She reports that she does not drink alcohol and does not use drugs.    No Known Allergies      Current Outpatient Medications:     Accu-Chek Sailaja Plus test strip, CHECK BLOOD SUGAR MEDICARE PAYS FOR TESTING UP TO THREE TIMES DAILY FOR INSULIN DEPENDENT PATIENTS IF PRESCRIBED, Disp: , Rfl:     albuterol (PROVENTIL HFA;VENTOLIN HFA) 108 (90 BASE) MCG/ACT inhaler, Inhale 2 puffs Every 4 (Four) Hours As Needed for Wheezing., Disp: , Rfl:     alendronate (FOSAMAX) 70 MG tablet, Take 1 tablet by mouth., Disp: , Rfl:     AquaLance Lancets 30G misc, USE TO CHECK BLOOD SUGAR LEVEL THREE TIMES A DAY (MEDICARE PAYS FOR TESTING ONCE DAILY FOR NON-INSULIN DEPENDENT PATIENTS), Disp: , Rfl:     aspirin 81 MG EC tablet, Take 1 tablet by mouth Daily., Disp: 90 tablet, Rfl: 3    atorvastatin (LIPITOR) 80 MG tablet, Take 1 tablet by mouth Daily. (Patient taking differently: Take 0.5 tablets by mouth Daily.), Disp: 30 tablet, Rfl: 11    carvedilol (COREG) 25 MG tablet, Take 1 tablet by mouth 2 (Two) Times a Day With Meals., Disp: 60 tablet, Rfl: 5    clopidogrel (PLAVIX) 75 MG tablet, Take 1 tablet by mouth Daily., Disp: 30 tablet, Rfl: 3    famotidine (Pepcid) 20 MG tablet, Take 1 tablet by mouth 2 (Two) Times a Day., Disp: 60 tablet, Rfl: 3    fluticasone (FLONASE) 50 MCG/ACT nasal spray, Administer 2 sprays into the nostril(s) as directed by provider Daily., Disp: , Rfl:     loratadine (CLARITIN) 10 MG tablet, Take 1 tablet by mouth  Every Night., Disp: , Rfl:     metFORMIN (GLUCOPHAGE) 500 MG tablet, Take 1 tablet by mouth Daily. for blood sugar, Disp: , Rfl:     montelukast (SINGULAIR) 10 MG tablet, TAKE ONE TABLET BY MOUTH EVERY EVENING FOR ALLERGIES, Disp: , Rfl:     nitroglycerin (NITROSTAT) 0.4 MG SL tablet, Place 1 tablet under the tongue Every 5 (Five) Minutes As Needed for Chest Pain for up to 25 doses. 1tab q 5mins X3., Disp: 25 tablet, Rfl: 1    Trelegy Ellipta 100-62.5-25 MCG/INH inhaler, INHALE ONE PUFF BY MOUTH EVERY DAY FOR BREATHING, Disp: , Rfl:     amLODIPine (NORVASC) 5 MG tablet, Take 1 tablet by mouth Daily., Disp: 30 tablet, Rfl: 3    budesonide-formoterol (SYMBICORT) 160-4.5 MCG/ACT inhaler, Inhale 2 puffs 2 (Two) Times a Day. (Patient not taking: Reported on 10/22/2024), Disp: , Rfl:     naproxen (NAPROSYN) 500 MG tablet, Take 1 tablet by mouth 2 (Two) Times a Day. (Patient not taking: Reported on 10/22/2024), Disp: 60 tablet, Rfl: 2      Physical Exam:  I have reviewed the patient's current vital signs as documented in the patient's EMR.   Vitals:    10/22/24 1001   BP: 154/77   Pulse: 76   SpO2: 90%     Body mass index is 22.99 kg/m².       10/22/24  1001   Weight: 60.8 kg (134 lb)      Physical Exam  Constitutional:       General: She is not in acute distress.     Appearance: Normal appearance. She is well-developed and normal weight.   HENT:      Head: Normocephalic and atraumatic.   Eyes:      General: Lids are normal.      Conjunctiva/sclera: Conjunctivae normal.      Pupils: Pupils are equal, round, and reactive to light.   Neck:      Vascular: No carotid bruit or JVD.   Cardiovascular:      Rate and Rhythm: Normal rate and regular rhythm.      Pulses: Normal pulses.      Heart sounds: Normal heart sounds, S1 normal and S2 normal. No murmur heard.  Pulmonary:      Effort: Pulmonary effort is normal. No respiratory distress.      Breath sounds: Normal breath sounds. No wheezing.   Abdominal:      General: Bowel  sounds are normal. There is no distension.      Palpations: Abdomen is soft. There is no hepatomegaly or splenomegaly.      Tenderness: There is no abdominal tenderness.   Musculoskeletal:         General: No swelling. Normal range of motion.      Cervical back: Normal range of motion and neck supple.      Right lower leg: No edema.      Left lower leg: No edema.   Skin:     General: Skin is warm and dry.      Coloration: Skin is not jaundiced.      Findings: No rash.   Neurological:      General: No focal deficit present.      Mental Status: She is alert and oriented to person, place, and time. Mental status is at baseline.   Psychiatric:         Mood and Affect: Mood normal.         Speech: Speech normal.         Behavior: Behavior normal.         Thought Content: Thought content normal.         Judgment: Judgment normal.          DATA REVIEWED:     TTE/SAM:  Results for orders placed during the hospital encounter of 11/01/17    Adult Transthoracic Echo Complete W/ Cont if Necessary Per Protocol    Interpretation Summary  · Estimated EF appears to be in the range of 56 - 60%. Normal left ventricular cavity size and wall thickness noted. All left ventricular wall segments contract normally.  · No aortic stenosis  · Trace mitral valve regurgitation  · Trace tricuspid valve regurgitation  · There is no evidence of pericardial effusion.    Echo study dated 10/09/2014, reports similar EF of 55-60%      Laboratory evaluations:    Lab Results   Component Value Date    GLUCOSE 93 02/16/2022    BUN 21 02/16/2022    CREATININE 0.71 02/16/2022    EGFRIFNONA 84 02/16/2022    BCR 29.6 (H) 02/16/2022    K 4.2 02/16/2022    CO2 24.1 02/16/2022    CALCIUM 9.9 02/16/2022    ALBUMIN 4.70 02/16/2022    AST 15 02/16/2022    ALT 30 02/16/2022     Lab Results   Component Value Date    WBC 10.70 02/16/2022    HGB 13.8 02/16/2022    HCT 41.2 02/16/2022    MCV 94.9 02/16/2022     02/16/2022     Lab Results   Component Value Date     "CHOL 158 02/16/2022    TRIG 83 02/16/2022    HDL 50 02/16/2022    LDL 92 02/16/2022     Lab Results   Component Value Date    TSH 1.060 02/16/2022     Lab Results   Component Value Date    HGBA1C 5.90 (H) 02/16/2022     Lab Results   Component Value Date    ALT 30 02/16/2022     Lab Results   Component Value Date    HGBA1C 5.90 (H) 02/16/2022    HGBA1C 6.01 (H) 03/10/2021    HGBA1C 5.80 (H) 01/04/2019     Lab Results   Component Value Date    CREATININE 0.71 02/16/2022     No results found for: \"IRON\", \"TIBC\", \"FERRITIN\"  Lab Results   Component Value Date    INR 1.00 12/14/2014    PROTIME 10.7 12/14/2014           ECG 12 Lead    Date/Time: 10/22/2024 10:35 AM  Performed by: Farida Adorno APRN    Authorized by: Farida Adorno APRN  Comparison: compared with previous ECG   Rhythm: sinus rhythm  Rate: normal  Other findings: non-specific ST-T wave changes    Clinical impression: non-specific ECG         --------------------------------------------------------------------------------------------------------------------------    ASSESSMENT/PLAN:      Diagnosis Plan   1. ASCVD (arteriosclerotic cardiovascular disease)  clopidogrel (PLAVIX) 75 MG tablet      2. PAD (peripheral artery disease)  Ambulatory Referral to Cardiology      3. Essential hypertension  carvedilol (COREG) 25 MG tablet          Hypertension  Blood pressure elevated in the office today.  States this is normally what it does at home.  Will start her on amlodipine 5 mg daily and titrate up as needed.  I have asked her to keep a blood pressure log and bring to her next visit.  She is to have lower by PCP on November 1 and faxed to our office.    ASCVD  Chronic chest pain.  Continue medical management with aspirin, statin, Coreg and Plavix.  Recent echocardiogram showed normal LV function    3.  Dyslipidemia  Labs requested by PCP.  Continue statin    4.  PAD  Patient recently underwent CTA with runoff which showed right common iliac " stenosis and external iliac occlusion with patent femoral-femoral bypass with left common iliac stenosis.  She was referred to interventional cardiology with [facility] who planned to proceed with revascularization as stenting of the left common iliac artery by radial approach.  Patient states she is unable to make it to [place] therefore she cannot have this done.  I will refer her to interventional cardiology [facility] with Dr. Osorio for possible intervention as  this patient does not have transportation to get to [place].  Continue aspirin, Plavix and statin      This document has been @Electronically signed by BUDDY Rios, 10/22/24, 8:28 AM EDT.       Dictated Utilizing Dragon Dictation: Part of this note may be an electronic transcription/translation of spoken language to printed text using the Dragon Dictation System.    Follow-up appointment and medication changes provided in hand delivered After Visit Summary as well as reviewed in the room.

## 2024-10-23 DIAGNOSIS — E78.5 DYSLIPIDEMIA: ICD-10-CM

## 2024-10-23 DIAGNOSIS — I10 ESSENTIAL HYPERTENSION: ICD-10-CM

## 2024-10-24 RX ORDER — CARVEDILOL 25 MG/1
TABLET ORAL
Qty: 60 TABLET | Refills: 0 | Status: SHIPPED | OUTPATIENT
Start: 2024-10-24

## 2024-10-24 RX ORDER — ATORVASTATIN CALCIUM 40 MG/1
40 TABLET, FILM COATED ORAL DAILY
Qty: 60 TABLET | Refills: 1 | Status: SHIPPED | OUTPATIENT
Start: 2024-10-24

## 2024-10-25 DIAGNOSIS — I25.10 ASCVD (ARTERIOSCLEROTIC CARDIOVASCULAR DISEASE): ICD-10-CM

## 2024-10-25 RX ORDER — CLOPIDOGREL BISULFATE 75 MG/1
75 TABLET ORAL DAILY
Qty: 30 TABLET | Refills: 3 | Status: SHIPPED | OUTPATIENT
Start: 2024-10-25

## 2024-10-25 NOTE — TELEPHONE ENCOUNTER
Caller: Grecia Galicia    Relationship: Self    Best call back number: [phone]    Requested Prescriptions:   Requested Prescriptions     Pending Prescriptions Disp Refills    clopidogrel (PLAVIX) 75 MG tablet 30 tablet 3     Sig: Take 1 tablet by mouth Daily.        Pharmacy where request should be sent: [address] [phone] [facility] [phone] FX     Last office visit with prescribing clinician: 10/22/2024   Last telemedicine visit with prescribing clinician: Visit date not found   Next office visit with prescribing clinician: 11/19/2024     Does the patient have less than a 3 day supply:  [x] Yes  [] No    Would you like a call back once the refill request has been completed: [] Yes [x] No    If the office needs to give you a call back, can they leave a voicemail: [] Yes [x] No    Arturo Light Rep   10/25/24 08:38 EDT

## 2024-10-28 DIAGNOSIS — E78.5 DYSLIPIDEMIA: ICD-10-CM

## 2024-10-28 NOTE — TELEPHONE ENCOUNTER
Caller: GraysonLisaGrecia C    Relationship: Self    Best call back number: [phone]    Requested Prescriptions:   Requested Prescriptions     Pending Prescriptions Disp Refills    atorvastatin (LIPITOR) 40 MG tablet 60 tablet 1     Sig: Take 1 tablet by mouth Daily.        Pharmacy where request should be sent: [address] [phone] [facility] [phone] FX     Last office visit with prescribing clinician: 10/22/2024   Last telemedicine visit with prescribing clinician: Visit date not found   Next office visit with prescribing clinician: 11/19/2024     Additional details provided by patient: PATIENT HAS BEEN COMPLETELY OUT FOR A WEEK. SHE ALSO STATES THE BOTTLE SHE HAS NOW IS FOR 80 MG AND SHE WAS NEVER TOLD TO CUT THEM IN HALF. PLEASE ADVISE    Does the patient have less than a 3 day supply:  [x] Yes  [] No    Would you like a call back once the refill request has been completed: [x] Yes [] No    If the office needs to give you a call back, can they leave a voicemail: [x] Yes [] No    Arturo Rose Rep   10/28/24 14:28 EDT

## 2024-10-29 RX ORDER — ATORVASTATIN CALCIUM 40 MG/1
40 TABLET, FILM COATED ORAL DAILY
Qty: 60 TABLET | Refills: 1 | OUTPATIENT
Start: 2024-10-29

## 2024-11-11 ENCOUNTER — TRANSCRIBE ORDERS (OUTPATIENT)
Dept: ADMINISTRATIVE | Facility: HOSPITAL | Age: 64
End: 2024-11-11
Payer: MEDICARE

## 2024-11-11 DIAGNOSIS — Z87.891 PERSONAL HISTORY OF TOBACCO USE, PRESENTING HAZARDS TO HEALTH: Primary | ICD-10-CM

## 2024-11-19 ENCOUNTER — OFFICE VISIT (OUTPATIENT)
Dept: CARDIOLOGY | Facility: CLINIC | Age: 64
End: 2024-11-19
Payer: MEDICARE

## 2024-11-19 ENCOUNTER — TELEPHONE (OUTPATIENT)
Dept: CARDIOLOGY | Facility: CLINIC | Age: 64
End: 2024-11-19

## 2024-11-19 VITALS
SYSTOLIC BLOOD PRESSURE: 119 MMHG | HEIGHT: 64 IN | OXYGEN SATURATION: 90 % | WEIGHT: 130 LBS | DIASTOLIC BLOOD PRESSURE: 70 MMHG | BODY MASS INDEX: 22.2 KG/M2 | HEART RATE: 80 BPM

## 2024-11-19 DIAGNOSIS — I25.10 ASCVD (ARTERIOSCLEROTIC CARDIOVASCULAR DISEASE): ICD-10-CM

## 2024-11-19 DIAGNOSIS — I10 ESSENTIAL HYPERTENSION: Primary | ICD-10-CM

## 2024-11-19 DIAGNOSIS — I73.9 PAD (PERIPHERAL ARTERY DISEASE): ICD-10-CM

## 2024-11-19 DIAGNOSIS — E78.5 DYSLIPIDEMIA: ICD-10-CM

## 2024-11-19 PROCEDURE — 1160F RVW MEDS BY RX/DR IN RCRD: CPT | Performed by: NURSE PRACTITIONER

## 2024-11-19 PROCEDURE — 3078F DIAST BP <80 MM HG: CPT | Performed by: NURSE PRACTITIONER

## 2024-11-19 PROCEDURE — 3074F SYST BP LT 130 MM HG: CPT | Performed by: NURSE PRACTITIONER

## 2024-11-19 PROCEDURE — 1159F MED LIST DOCD IN RCRD: CPT | Performed by: NURSE PRACTITIONER

## 2024-11-19 PROCEDURE — 99214 OFFICE O/P EST MOD 30 MIN: CPT | Performed by: NURSE PRACTITIONER

## 2024-11-19 NOTE — TELEPHONE ENCOUNTER
----- Message from Farida Adorno sent at 11/19/2024  9:48 AM EST -----  Please request labs from PCP

## 2024-11-19 NOTE — PROGRESS NOTES
[facility]             BUDDY Rios         Monday, Gaby Jonas, APRN  Grecia Galicia  1960 11/19/2024    Patient Active Problem List   Diagnosis    Essential hypertension    Pneumonia of both lower lobes due to infectious organism    Dyslipidemia    PVC's (premature ventricular contractions)    ASCVD (arteriosclerotic cardiovascular disease)    PAD (peripheral artery disease)       Dear Monday, Gaby Jonas, APRN:    Subjective     Chief Complaint   Patient presents with    Follow-up       HPI:     This is a 64 y.o. female with known past medical history of  acute MI with subsequent PCI in 2005, peripheral arterial disease status post intervention to the lower extremities, essential pretension, dyslipidemia and tobacco abuse.     Grecia Galicia presents today for routine cardiology follow up.  Patient states she has been doing about the same since her last visit.  Denies any chest pain or shortness of breath.  Continues to have bilateral claudication of her lower extremities.  Is scheduled to see Dr. Osorio at the end of November. Recent lab work by PCP not available for review today.     Diagnostic Testing  Echocardiogram 11/2017: EF 56 to 60%  Nuclear stress test 3/2019: No evidence of ischemia     All other systems were reviewed and were negative.    Patient Active Problem List   Diagnosis    Essential hypertension    Pneumonia of both lower lobes due to infectious organism    Dyslipidemia    PVC's (premature ventricular contractions)    ASCVD (arteriosclerotic cardiovascular disease)    PAD (peripheral artery disease)       family history includes Heart attack in her mother; Heart disease in her brother.     reports that she quit smoking about 3 years ago. Her smoking use included cigarettes. She started smoking about 43 years ago. She has a 80 pack-year smoking history. She has never used smokeless tobacco. She reports that she does not drink  Everardo Bansal, your thyroid test came back fine. You can stay on same dose synthroid. I will send to the pharmacy. alcohol and does not use drugs.    No Known Allergies      Current Outpatient Medications:     Accu-Chek Sailaja Plus test strip, CHECK BLOOD SUGAR MEDICARE PAYS FOR TESTING UP TO THREE TIMES DAILY FOR INSULIN DEPENDENT PATIENTS IF PRESCRIBED, Disp: , Rfl:     albuterol (PROVENTIL HFA;VENTOLIN HFA) 108 (90 BASE) MCG/ACT inhaler, Inhale 2 puffs Every 4 (Four) Hours As Needed for Wheezing., Disp: , Rfl:     alendronate (FOSAMAX) 70 MG tablet, Take 1 tablet by mouth., Disp: , Rfl:     amLODIPine (NORVASC) 5 MG tablet, Take 1 tablet by mouth Daily., Disp: 30 tablet, Rfl: 3    AquaLance Lancets 30G misc, USE TO CHECK BLOOD SUGAR LEVEL THREE TIMES A DAY (MEDICARE PAYS FOR TESTING ONCE DAILY FOR NON-INSULIN DEPENDENT PATIENTS), Disp: , Rfl:     aspirin 81 MG EC tablet, Take 1 tablet by mouth Daily., Disp: 90 tablet, Rfl: 3    atorvastatin (LIPITOR) 40 MG tablet, Take 1 tablet by mouth Daily., Disp: 60 tablet, Rfl: 1    carvedilol (COREG) 25 MG tablet, take one tablet BY MOUTH TWICE DAILY WITH A MEAL, Disp: 60 tablet, Rfl: 0    clopidogrel (PLAVIX) 75 MG tablet, Take 1 tablet by mouth Daily., Disp: 30 tablet, Rfl: 3    famotidine (Pepcid) 20 MG tablet, Take 1 tablet by mouth 2 (Two) Times a Day., Disp: 60 tablet, Rfl: 3    fluticasone (FLONASE) 50 MCG/ACT nasal spray, Administer 2 sprays into the nostril(s) as directed by provider Daily., Disp: , Rfl:     loratadine (CLARITIN) 10 MG tablet, Take 1 tablet by mouth Every Night., Disp: , Rfl:     metFORMIN (GLUCOPHAGE) 500 MG tablet, Take 1 tablet by mouth Daily. for blood sugar, Disp: , Rfl:     montelukast (SINGULAIR) 10 MG tablet, TAKE ONE TABLET BY MOUTH EVERY EVENING FOR ALLERGIES, Disp: , Rfl:     nitroglycerin (NITROSTAT) 0.4 MG SL tablet, Place 1 tablet under the tongue Every 5 (Five) Minutes As Needed for Chest Pain for up to 25 doses. 1tab q 5mins X3., Disp: 25 tablet, Rfl: 1    Trelegy Ellipta 100-62.5-25 MCG/INH inhaler, INHALE ONE PUFF BY MOUTH EVERY DAY FOR  BREATHING, Disp: , Rfl:     budesonide-formoterol (SYMBICORT) 160-4.5 MCG/ACT inhaler, Inhale 2 puffs 2 (Two) Times a Day. (Patient not taking: Reported on 10/22/2024), Disp: , Rfl:     naproxen (NAPROSYN) 500 MG tablet, Take 1 tablet by mouth 2 (Two) Times a Day. (Patient not taking: Reported on 11/19/2024), Disp: 60 tablet, Rfl: 2      Physical Exam:  I have reviewed the patient's current vital signs as documented in the patient's EMR.   Vitals:    11/19/24 0938   BP: 119/70   Pulse: 80   SpO2: 90%     Body mass index is 22.3 kg/m².       11/19/24  0938   Weight: 59 kg (130 lb)      Physical Exam  Constitutional:       General: She is not in acute distress.     Appearance: Normal appearance. She is well-developed and normal weight.   HENT:      Head: Normocephalic and atraumatic.   Eyes:      General: Lids are normal.      Conjunctiva/sclera: Conjunctivae normal.      Pupils: Pupils are equal, round, and reactive to light.   Neck:      Vascular: No carotid bruit or JVD.   Cardiovascular:      Rate and Rhythm: Normal rate and regular rhythm.      Pulses: Normal pulses.      Heart sounds: Normal heart sounds, S1 normal and S2 normal. No murmur heard.  Pulmonary:      Effort: Pulmonary effort is normal. No respiratory distress.      Breath sounds: Normal breath sounds. No wheezing.   Abdominal:      General: Bowel sounds are normal. There is no distension.      Palpations: Abdomen is soft. There is no hepatomegaly or splenomegaly.      Tenderness: There is no abdominal tenderness.   Musculoskeletal:         General: No swelling. Normal range of motion.      Cervical back: Normal range of motion and neck supple.      Right lower leg: No edema.      Left lower leg: No edema.   Skin:     General: Skin is warm and dry.      Coloration: Skin is not jaundiced.      Findings: No rash.   Neurological:      General: No focal deficit present.      Mental Status: She is alert and oriented to person, place, and time. Mental  "status is at baseline.   Psychiatric:         Mood and Affect: Mood normal.         Speech: Speech normal.         Behavior: Behavior normal.         Thought Content: Thought content normal.         Judgment: Judgment normal.            DATA REVIEWED:     TTE/SAM:  Results for orders placed during the hospital encounter of 11/01/17    Adult Transthoracic Echo Complete W/ Cont if Necessary Per Protocol    Interpretation Summary  · Estimated EF appears to be in the range of 56 - 60%. Normal left ventricular cavity size and wall thickness noted. All left ventricular wall segments contract normally.  · No aortic stenosis  · Trace mitral valve regurgitation  · Trace tricuspid valve regurgitation  · There is no evidence of pericardial effusion.    Echo study dated 10/09/2014, reports similar EF of 55-60%      Laboratory evaluations:    Lab Results   Component Value Date    GLUCOSE 93 02/16/2022    BUN 21 02/16/2022    CREATININE 0.71 02/16/2022    EGFRIFNONA 84 02/16/2022    BCR 29.6 (H) 02/16/2022    K 4.2 02/16/2022    CO2 24.1 02/16/2022    CALCIUM 9.9 02/16/2022    ALBUMIN 4.70 02/16/2022    AST 15 02/16/2022    ALT 30 02/16/2022     Lab Results   Component Value Date    WBC 10.70 02/16/2022    HGB 13.8 02/16/2022    HCT 41.2 02/16/2022    MCV 94.9 02/16/2022     02/16/2022     Lab Results   Component Value Date    CHOL 158 02/16/2022    TRIG 83 02/16/2022    HDL 50 02/16/2022    LDL 92 02/16/2022     Lab Results   Component Value Date    TSH 1.060 02/16/2022     Lab Results   Component Value Date    HGBA1C 5.90 (H) 02/16/2022     Lab Results   Component Value Date    ALT 30 02/16/2022     Lab Results   Component Value Date    HGBA1C 5.90 (H) 02/16/2022    HGBA1C 6.01 (H) 03/10/2021    HGBA1C 5.80 (H) 01/04/2019     Lab Results   Component Value Date    CREATININE 0.71 02/16/2022     No results found for: \"IRON\", \"TIBC\", \"FERRITIN\"  Lab Results   Component Value Date    INR 1.00 12/14/2014    PROTIME 10.7 " "12/14/2014      No components found for: \"ABSOLUTELUNG\"    --------------------------------------------------------------------------------------------------------------------------    ASSESSMENT/PLAN:      Diagnosis Plan   1. Essential hypertension        2. ASCVD (arteriosclerotic cardiovascular disease)        3. Dyslipidemia        4. PAD (peripheral artery disease)            ASCVD  Denies any chest pain.  Continue medical management with aspirin, statin, Coreg and Plavix.  Recent echocardiogram showed normal LV function    Hypertension  Well-controlled since starting amlodipine.  Labs requested from PCP.  Continue current management.    3.  Dyslipidemia  Labs requested from PCP.  Continue statin    4.  PAD  Patient recently underwent CTA with runoff which showed right common iliac stenosis and external iliac occlusion with patent femoral-femoral bypass with left common iliac stenosis.  She was referred to interventional cardiology with [facility] who planned to proceed with revascularization and stenting of the left common iliac artery by radial approach however patient was unable to make it to [place] therefore she did not have this done.  I have referred her to interventional cardiology at [facility] with Dr. Osorio and she does have an appointment on 11/25/2024.  Advised her to keep this follow-up.        This document has been @Electronically signed by BUDDY Rios, 11/19/24, 8:55 AM EST.       Dictated Utilizing Dragon Dictation: Part of this note may be an electronic transcription/translation of spoken language to printed text using the Dragon Dictation System.    Follow-up appointment and medication changes provided in hand delivered After Visit Summary as well as reviewed in the room.     "

## 2024-11-22 ENCOUNTER — HOSPITAL ENCOUNTER (OUTPATIENT)
Facility: HOSPITAL | Age: 64
Discharge: HOME | End: 2024-11-22
Payer: MEDICARE

## 2024-11-22 DIAGNOSIS — Z87.891 PERSONAL HISTORY OF TOBACCO USE, PRESENTING HAZARDS TO HEALTH: ICD-10-CM

## 2024-11-22 PROCEDURE — 71271 CT THORAX LUNG CANCER SCR C-: CPT

## 2024-11-22 PROCEDURE — 71271 CT THORAX LUNG CANCER SCR C-: CPT | Performed by: RADIOLOGY

## 2024-11-25 ENCOUNTER — PATIENT ROUNDING (BHMG ONLY) (OUTPATIENT)
Dept: CARDIOLOGY | Facility: CLINIC | Age: 64
End: 2024-11-25
Payer: MEDICARE

## 2024-11-25 ENCOUNTER — OFFICE VISIT (OUTPATIENT)
Dept: CARDIOLOGY | Facility: CLINIC | Age: 64
End: 2024-11-25
Payer: MEDICARE

## 2024-11-25 VITALS
BODY MASS INDEX: 22.61 KG/M2 | WEIGHT: 132.4 LBS | DIASTOLIC BLOOD PRESSURE: 85 MMHG | OXYGEN SATURATION: 90 % | SYSTOLIC BLOOD PRESSURE: 146 MMHG | HEART RATE: 88 BPM | HEIGHT: 64 IN

## 2024-11-25 DIAGNOSIS — I70.223 CRITICAL LIMB ISCHEMIA OF BOTH LOWER EXTREMITIES WITH REST PAIN: Primary | ICD-10-CM

## 2024-11-25 PROCEDURE — 3079F DIAST BP 80-89 MM HG: CPT | Performed by: INTERNAL MEDICINE

## 2024-11-25 PROCEDURE — 3077F SYST BP >= 140 MM HG: CPT | Performed by: INTERNAL MEDICINE

## 2024-11-25 PROCEDURE — 99204 OFFICE O/P NEW MOD 45 MIN: CPT | Performed by: INTERNAL MEDICINE

## 2024-11-25 NOTE — PROGRESS NOTES
[facility]  [address] [place] [address]  Phone: [phone]  Fax: [phone]    11/25/2024    Chief Complaint   Patient presents with    Follow-up     Denies CP, Complains of SOA/COPD,right leg swelling burning in groining area.    Med Management     Tolerating all current medications.        History:   Grecia Galicia is a 64 y.o. female seen in consultation, referred by Dr.Charity Angelica Adorno,*  for treatment of iliac disease.  CT angiogram done showed bilateral iliac disease.  She states she had some type of tube in her groin which seemed to be describing a femorofemoral bypass in [place] many years ago.  She said her symptoms improved after the bypass however they have been worsening.  The CT scan makes no mention of a bypass graft.            Past Medical History:   Diagnosis Date    Atherosclerotic cardiovascular disease     Dyslipidemia     Hypertension     Myocardial infarction     WITH STENTING IN 2007.     PAD (peripheral artery disease)        Past Surgical History:   Procedure Laterality Date    CORONARY ANGIOPLASTY  2007    ALSO STENTING x1, RCA     FEMORAL FEMORAL BYPASS Right     ILIAC ARTERY STENT          ROS  As above otherwise negative    Past Social History:  Social History     Socioeconomic History    Marital status:    Tobacco Use    Smoking status: Former     Current packs/day: 0.00     Average packs/day: 2.0 packs/day for 40.0 years (80.0 ttl pk-yrs)     Types: Cigarettes     Start date: 1981     Quit date: 2021     Years since quitting: 3.9    Smokeless tobacco: Never    Tobacco comments:     QUIT DEC 2020   Vaping Use    Vaping status: Never Used   Substance and Sexual Activity    Alcohol use: No    Drug use: No    Sexual activity: Defer       Past Family History:  Family History   Problem Relation Age of Onset    Heart attack Mother     Heart disease Brother        Current Outpatient Medications   Medication Sig  Dispense Refill    Accu-Chek Sailaja Plus test strip CHECK BLOOD SUGAR MEDICARE PAYS FOR TESTING UP TO THREE TIMES DAILY FOR INSULIN DEPENDENT PATIENTS IF PRESCRIBED      albuterol (PROVENTIL HFA;VENTOLIN HFA) 108 (90 BASE) MCG/ACT inhaler Inhale 2 puffs Every 4 (Four) Hours As Needed for Wheezing.      alendronate (FOSAMAX) 70 MG tablet Take 1 tablet by mouth.      amLODIPine (NORVASC) 5 MG tablet Take 1 tablet by mouth Daily. 30 tablet 3    AquaLance Lancets 30G misc USE TO CHECK BLOOD SUGAR LEVEL THREE TIMES A DAY (MEDICARE PAYS FOR TESTING ONCE DAILY FOR NON-INSULIN DEPENDENT PATIENTS)      aspirin 81 MG EC tablet Take 1 tablet by mouth Daily. 90 tablet 3    atorvastatin (LIPITOR) 40 MG tablet Take 1 tablet by mouth Daily. 60 tablet 1    budesonide-formoterol (SYMBICORT) 160-4.5 MCG/ACT inhaler Inhale 2 puffs 2 (Two) Times a Day.      carvedilol (COREG) 25 MG tablet take one tablet BY MOUTH TWICE DAILY WITH A MEAL 60 tablet 0    clopidogrel (PLAVIX) 75 MG tablet Take 1 tablet by mouth Daily. 30 tablet 3    famotidine (Pepcid) 20 MG tablet Take 1 tablet by mouth 2 (Two) Times a Day. 60 tablet 3    fluticasone (FLONASE) 50 MCG/ACT nasal spray Administer 2 sprays into the nostril(s) as directed by provider Daily.      loratadine (CLARITIN) 10 MG tablet Take 1 tablet by mouth Every Night.      metFORMIN (GLUCOPHAGE) 500 MG tablet Take 1 tablet by mouth Daily. for blood sugar      montelukast (SINGULAIR) 10 MG tablet TAKE ONE TABLET BY MOUTH EVERY EVENING FOR ALLERGIES      nitroglycerin (NITROSTAT) 0.4 MG SL tablet Place 1 tablet under the tongue Every 5 (Five) Minutes As Needed for Chest Pain for up to 25 doses. 1tab q 5mins X3. 25 tablet 1    Trelegy Ellipta 100-62.5-25 MCG/INH inhaler INHALE ONE PUFF BY MOUTH EVERY DAY FOR BREATHING      naproxen (NAPROSYN) 500 MG tablet Take 1 tablet by mouth 2 (Two) Times a Day. (Patient not taking: Reported on 10/22/2024) 60 tablet 2     No current facility-administered  medications for this visit.        No Known Allergies      Objective:  Vitals:    11/25/24 1351   BP: 146/85   Pulse: 88   SpO2: 90%       Physical Exam  Atrophy of both legs on the left and the right reduced hair skin changes seen.  Pulses are weak and feeble.  No ulcers seen.    DATA:  Results for orders placed during the hospital encounter of 11/01/17    SCANNED - TELEMETRY     Results for orders placed during the hospital encounter of 11/01/17    Adult Transthoracic Echo Complete W/ Cont if Necessary Per Protocol    Interpretation Summary  · Estimated EF appears to be in the range of 56 - 60%. Normal left ventricular cavity size and wall thickness noted. All left ventricular wall segments contract normally.  · No aortic stenosis  · Trace mitral valve regurgitation  · Trace tricuspid valve regurgitation  · There is no evidence of pericardial effusion.    Echo study dated 10/09/2014, reports similar EF of 55-60%   Results for orders placed during the hospital encounter of 03/07/19    Stress Test With Myocardial Perfusion    Interpretation Summary  · Findings consistent with a normal ECG stress test.  · Myocardial perfusion imaging indicates a normal myocardial perfusion study with no evidence of ischemia.  · Gated scans were not completed.  · Impressions are consistent with a low risk study.   Results for orders placed during the hospital encounter of 03/07/19    Stress Test With Myocardial Perfusion    Interpretation Summary  · Findings consistent with a normal ECG stress test.  · Myocardial perfusion imaging indicates a normal myocardial perfusion study with no evidence of ischemia.  · Gated scans were not completed.  · Impressions are consistent with a low risk study.        Procedures     Lab Results   Component Value Date    CHOL 158 02/16/2022    CHOL 139 03/10/2021    CHOL 131 01/04/2019     Lab Results   Component Value Date    TRIG 83 02/16/2022    TRIG 51 03/10/2021    TRIG 111 01/04/2019     Lab Results  "  Component Value Date    HDL 50 02/16/2022    HDL 48 03/10/2021    HDL 50 (L) 01/04/2019     Lab Results   Component Value Date    LDL 92 02/16/2022    LDL 80 03/10/2021    LDL 59 01/04/2019       Lab Results   Component Value Date    TSH 1.060 02/16/2022             Invalid input(s): \"LABALBU\", \"PROT\"            Assessment and Plan:    Grecia Galicia .     Patient has already quit smoking    She has critical limb ischemia with Roberto Carlos 4 symptoms despite conservative therapy.  She is having rest pain as well and difficulty walking.  We will plan to proceed with planned angiogram and possible intervention of the left and right iliac.  Seems like she had a failed femorofemoral bypass as what she describes.    Thank you for allowing me to participate in the care of Grecia Galicia. Feel free to contact me directly with any further questions or concerns.            Ren Oosrio MD, FACC, [facility]  Interventional Cardiology     "

## 2024-11-25 NOTE — PROGRESS NOTES
A [employer] message has been sent to the patient for patient rounding for [facility]-Interventional Cardiology

## 2024-12-30 ENCOUNTER — TELEPHONE (OUTPATIENT)
Dept: CARDIOLOGY | Facility: CLINIC | Age: 64
End: 2024-12-30
Payer: MEDICARE

## 2024-12-30 ENCOUNTER — LAB (OUTPATIENT)
Dept: LAB | Facility: HOSPITAL | Age: 64
End: 2024-12-30
Payer: MEDICARE

## 2024-12-30 DIAGNOSIS — I70.223 CRITICAL LIMB ISCHEMIA OF BOTH LOWER EXTREMITIES WITH REST PAIN: Primary | ICD-10-CM

## 2024-12-30 DIAGNOSIS — I70.223 CRITICAL LIMB ISCHEMIA OF BOTH LOWER EXTREMITIES WITH REST PAIN: ICD-10-CM

## 2024-12-30 LAB
ANION GAP SERPL CALCULATED.3IONS-SCNC: 13 MMOL/L (ref 5–15)
BASOPHILS # BLD AUTO: 0.07 10*3/MM3 (ref 0–0.2)
BASOPHILS NFR BLD AUTO: 1 % (ref 0–1.5)
BUN SERPL-MCNC: 19 MG/DL (ref 8–23)
BUN/CREAT SERPL: 21.8 (ref 7–25)
CALCIUM SPEC-SCNC: 9.8 MG/DL (ref 8.6–10.5)
CHLORIDE SERPL-SCNC: 106 MMOL/L (ref 98–107)
CO2 SERPL-SCNC: 24 MMOL/L (ref 22–29)
CREAT SERPL-MCNC: 0.87 MG/DL (ref 0.57–1)
DEPRECATED RDW RBC AUTO: 48.4 FL (ref 37–54)
EGFRCR SERPLBLD CKD-EPI 2021: 75 ML/MIN/1.73
EOSINOPHIL # BLD AUTO: 0.33 10*3/MM3 (ref 0–0.4)
EOSINOPHIL NFR BLD AUTO: 4.8 % (ref 0.3–6.2)
ERYTHROCYTE [DISTWIDTH] IN BLOOD BY AUTOMATED COUNT: 13.8 % (ref 12.3–15.4)
GLUCOSE SERPL-MCNC: 104 MG/DL (ref 65–99)
HCT VFR BLD AUTO: 45.5 % (ref 34–46.6)
HGB BLD-MCNC: 14 G/DL (ref 12–15.9)
IMM GRANULOCYTES # BLD AUTO: 0.02 10*3/MM3 (ref 0–0.05)
IMM GRANULOCYTES NFR BLD AUTO: 0.3 % (ref 0–0.5)
LYMPHOCYTES # BLD AUTO: 1.55 10*3/MM3 (ref 0.7–3.1)
LYMPHOCYTES NFR BLD AUTO: 22.7 % (ref 19.6–45.3)
MCH RBC QN AUTO: 29.4 PG (ref 26.6–33)
MCHC RBC AUTO-ENTMCNC: 30.8 G/DL (ref 31.5–35.7)
MCV RBC AUTO: 95.4 FL (ref 79–97)
MONOCYTES # BLD AUTO: 0.55 10*3/MM3 (ref 0.1–0.9)
MONOCYTES NFR BLD AUTO: 8 % (ref 5–12)
NEUTROPHILS NFR BLD AUTO: 4.32 10*3/MM3 (ref 1.7–7)
NEUTROPHILS NFR BLD AUTO: 63.2 % (ref 42.7–76)
NRBC BLD AUTO-RTO: 0 /100 WBC (ref 0–0.2)
PLATELET # BLD AUTO: 230 10*3/MM3 (ref 140–450)
PMV BLD AUTO: 11.3 FL (ref 6–12)
POTASSIUM SERPL-SCNC: 4.7 MMOL/L (ref 3.5–5.2)
RBC # BLD AUTO: 4.77 10*6/MM3 (ref 3.77–5.28)
SODIUM SERPL-SCNC: 143 MMOL/L (ref 136–145)
WBC NRBC COR # BLD AUTO: 6.84 10*3/MM3 (ref 3.4–10.8)

## 2024-12-30 PROCEDURE — 85025 COMPLETE CBC W/AUTO DIFF WBC: CPT | Performed by: INTERNAL MEDICINE

## 2024-12-30 PROCEDURE — 80048 BASIC METABOLIC PNL TOTAL CA: CPT | Performed by: INTERNAL MEDICINE

## 2024-12-30 NOTE — TELEPHONE ENCOUNTER
Caller: Grecia Galicia    Relationship: Self    Best call back number: [phone]    What is the best time to reach you: ANY      What was the call regarding: PATIENT IS EXPECTING A CALL ABOUT INSTRUCTIONS FOR UPCOMING PROCEDURE. PLEASE CALL THE PATIENT TO ADVISE.     Is it okay if the provider responds through MyChart: NO

## 2024-12-30 NOTE — TELEPHONE ENCOUNTER
Spk to the patient let her know that she would be notified the day before her procedure with instructions. Patient was agreeable and understanding.

## 2024-12-31 NOTE — PERIOPERATIVE NURSING NOTE
Patient called and given instructions and arrival time for procedure scheduled on Thursday. Patient understood instructions and had no further questions at this time.

## 2025-01-02 ENCOUNTER — HOSPITAL ENCOUNTER (OUTPATIENT)
Facility: HOSPITAL | Age: 65
Discharge: HOME | End: 2025-01-02
Attending: INTERNAL MEDICINE | Admitting: INTERNAL MEDICINE
Payer: MEDICARE

## 2025-01-02 VITALS
DIASTOLIC BLOOD PRESSURE: 76 MMHG | RESPIRATION RATE: 15 BRPM | WEIGHT: 130 LBS | HEIGHT: 59 IN | TEMPERATURE: 97.9 F | BODY MASS INDEX: 26.21 KG/M2 | HEART RATE: 95 BPM | SYSTOLIC BLOOD PRESSURE: 120 MMHG | OXYGEN SATURATION: 94 %

## 2025-01-02 DIAGNOSIS — I70.223 CRITICAL LIMB ISCHEMIA OF BOTH LOWER EXTREMITIES WITH REST PAIN: ICD-10-CM

## 2025-01-02 PROBLEM — I73.9 PERIPHERAL ARTERY DISEASE: Status: ACTIVE | Noted: 2025-01-02

## 2025-01-02 PROCEDURE — 25010000002 MIDAZOLAM PER 1 MG: Performed by: INTERNAL MEDICINE

## 2025-01-02 PROCEDURE — 25010000002 FENTANYL CITRATE (PF) 50 MCG/ML SOLUTION: Performed by: INTERNAL MEDICINE

## 2025-01-02 PROCEDURE — C1725 CATH, TRANSLUMIN NON-LASER: HCPCS | Performed by: INTERNAL MEDICINE

## 2025-01-02 PROCEDURE — C1887 CATHETER, GUIDING: HCPCS | Performed by: INTERNAL MEDICINE

## 2025-01-02 PROCEDURE — 25010000002 HEPARIN (PORCINE) PER 1000 UNITS: Performed by: INTERNAL MEDICINE

## 2025-01-02 PROCEDURE — 25510000001 IOPAMIDOL 61 % SOLUTION: Performed by: INTERNAL MEDICINE

## 2025-01-02 PROCEDURE — C1769 GUIDE WIRE: HCPCS | Performed by: INTERNAL MEDICINE

## 2025-01-02 PROCEDURE — C1753 CATH, INTRAVAS ULTRASOUND: HCPCS | Performed by: INTERNAL MEDICINE

## 2025-01-02 PROCEDURE — C1894 INTRO/SHEATH, NON-LASER: HCPCS | Performed by: INTERNAL MEDICINE

## 2025-01-02 PROCEDURE — C2623 CATH, TRANSLUMIN, DRUG-COAT: HCPCS | Performed by: INTERNAL MEDICINE

## 2025-01-02 PROCEDURE — 37252 INTRVASC US NONCORONARY 1ST: CPT | Performed by: INTERNAL MEDICINE

## 2025-01-02 PROCEDURE — 99152 MOD SED SAME PHYS/QHP 5/>YRS: CPT | Performed by: INTERNAL MEDICINE

## 2025-01-02 PROCEDURE — 25010000002 LIDOCAINE 2% SOLUTION: Performed by: INTERNAL MEDICINE

## 2025-01-02 PROCEDURE — 25810000003 SODIUM CHLORIDE 0.9 % SOLUTION: Performed by: INTERNAL MEDICINE

## 2025-01-02 PROCEDURE — 99153 MOD SED SAME PHYS/QHP EA: CPT | Performed by: INTERNAL MEDICINE

## 2025-01-02 RX ORDER — SODIUM CHLORIDE 9 MG/ML
INJECTION, SOLUTION INTRAVENOUS
Status: COMPLETED | OUTPATIENT
Start: 2025-01-02 | End: 2025-01-02

## 2025-01-02 RX ORDER — MIDAZOLAM HYDROCHLORIDE 1 MG/ML
INJECTION, SOLUTION INTRAMUSCULAR; INTRAVENOUS
Status: DISCONTINUED | OUTPATIENT
Start: 2025-01-02 | End: 2025-01-02 | Stop reason: HOSPADM

## 2025-01-02 RX ORDER — ACETAMINOPHEN 325 MG/1
650 TABLET ORAL EVERY 4 HOURS PRN
Status: DISCONTINUED | OUTPATIENT
Start: 2025-01-02 | End: 2025-01-02 | Stop reason: HOSPADM

## 2025-01-02 RX ORDER — HEPARIN SODIUM 1000 [USP'U]/ML
INJECTION, SOLUTION INTRAVENOUS; SUBCUTANEOUS
Status: DISCONTINUED | OUTPATIENT
Start: 2025-01-02 | End: 2025-01-02 | Stop reason: HOSPADM

## 2025-01-02 RX ORDER — LIDOCAINE HYDROCHLORIDE 20 MG/ML
INJECTION, SOLUTION INFILTRATION; PERINEURAL
Status: DISCONTINUED | OUTPATIENT
Start: 2025-01-02 | End: 2025-01-02 | Stop reason: HOSPADM

## 2025-01-02 RX ORDER — NITROGLYCERIN 0.4 MG/1
0.4 TABLET SUBLINGUAL
Status: DISCONTINUED | OUTPATIENT
Start: 2025-01-02 | End: 2025-01-02 | Stop reason: HOSPADM

## 2025-01-02 RX ORDER — FENTANYL CITRATE 50 UG/ML
INJECTION, SOLUTION INTRAMUSCULAR; INTRAVENOUS
Status: DISCONTINUED | OUTPATIENT
Start: 2025-01-02 | End: 2025-01-02 | Stop reason: HOSPADM

## 2025-01-02 RX ORDER — IOPAMIDOL 612 MG/ML
INJECTION, SOLUTION INTRAVASCULAR
Status: DISCONTINUED | OUTPATIENT
Start: 2025-01-02 | End: 2025-01-02 | Stop reason: HOSPADM

## 2025-01-02 NOTE — Clinical Note
A 5 fr sheath was successfully inserted with ultrasound guidance into the right femoral artery. Micro puncture access only

## 2025-01-02 NOTE — H&P
[facility]   PREOPERATIVE HISTORY AND PHYSICAL    Patient Name:Grecia Galicia  : 1960  MRN: [id number]  Primary Care Physician: Gaby Shea APRN  Date of admission: 2025    Subjective   Subjective     Chief Complaint: preoperative evaluation    History of Present Illness  Grecia Galicia is a 64 y.o. female who presents for preoperative evaluation. She is scheduled for Peripheral angiography (N/A)  She was seen by me in the office and has a history of what she describes as a femorofemoral bypass however had a CT angiogram that showed that the bypass was patent however has severe disease in both iliac arteries.  Has been having bilateral leg pain severe intensity with known severe PAD    Review of Systems   As above otherwise negative  Personal History     Past Medical History:   Diagnosis Date    Atherosclerotic cardiovascular disease     Diabetes mellitus     Dyslipidemia     Hypertension     Myocardial infarction     WITH STENTING IN .     PAD (peripheral artery disease)        Past Surgical History:   Procedure Laterality Date    CORONARY ANGIOPLASTY      ALSO STENTING x1, RCA     FEMORAL FEMORAL BYPASS Right     ILIAC ARTERY STENT         Family History: Her family history includes Heart attack in her mother; Heart disease in her brother.     Social History: She  reports that she quit smoking about 4 years ago. Her smoking use included cigarettes. She started smoking about 44 years ago. She has a 80 pack-year smoking history. She has never used smokeless tobacco. She reports that she does not drink alcohol and does not use drugs.    Home Medications:  AquaLance Lancets 30G, Fluticasone-Umeclidin-Vilant, albuterol sulfate HFA, alendronate, amLODIPine, aspirin, atorvastatin, budesonide-formoterol, carvedilol, clopidogrel, famotidine, fluticasone, glucose blood, loratadine, metFORMIN, montelukast, naproxen, and nitroglycerin    Allergies:  She has No Known  Allergies.    Objective    Objective     Vitals:    Temp:  [97.9 °F (36.6 °C)] 97.9 °F (36.6 °C)  Heart Rate:  [90] 90  Resp:  [16] 16  BP: (133)/(110) 133/110    Physical Exam  Reduced pulses lower extremity pleasant cooperative alert and oriented no rhonchi or wheezing regular rate and rhythm  Assessment & Plan   Assessment / Plan     Brief Patient Summary:  Grecia Galicia is a 64 y.o. female who presents for preoperative evaluation.    Pre-Op Diagnosis Codes:      * Critical limb ischemia of both lower extremities with rest pain [I70.223]    Active Hospital Problems:  Active Hospital Problems    Diagnosis     **Critical limb ischemia of both lower extremities with rest pain      Plan:   Procedure(s):  Peripheral angiography  She has femorofemoral bypass however there is bilateral iliac disease.  In this situation the bypass is not helping her at all hence we will plan to do an angiogram and possibly intervene in 1 or both of the iliacs  The risks, benefits, and alternatives of the procedure including but not limited to and risks of the anesthesia were discussed in detail with the patient and questions were answered. No guarantees were made or implied. Informed consent was obtained.    Ren Osorio MD

## 2025-01-02 NOTE — Clinical Note
A 5 fr sheath was successfully inserted using micropuncture technique with ultrasound guidance into the right femoral artery. Sheath insertion not delayed. Michele Godoy is an 77 year old male.    Past Medical History:   Diagnosis Date   • Abnormal echocardiogram 6/6/2014 1/31/2014 Echocardiogram  Indication: pre-operative evaluation for abdominal surgery History of CAD; S/P CABG TDS: contrast used - post op paradoxical septal motion Normal LV size and systolic function LVEF = 60% Grade 2/4 diastolic dysfunction Grossly valve function os OK No pericardial effusion Unable to estimate PASP    • Anxiety    • Hooks's esophagus 8/24/2009   • Basal cell carcinoma 04/03/2018    nose   • Blood transfusion 2000   • Chronic kidney disease 08/12/2014    stage 3   • Chronic pain 08/02/2013    back   • Claustrophobia    • Degenerative joint disease     gouty   • Discoid lupus    • Dyslipidemia 5/7/2012   • Eczema    • Erectile dysfunction    • Gastritis 11/27/2018    Dr. Thurman   • Hematoma complicating a procedure 5/3/2012    Right groin, post cath of 4/18/2012    • History of cardiac catheterization 6/6/2014 4/18/2012 SUMMARY:  1. Severe 3-vessel coronary artery disease.  2. Patent bypass grafts.  3. Severe disease and bypass graft, aorta to posterior descending artery after anastomosis distally.     • HTN (hypertension), benign 12/6/2011   • Impaired functional mobility, balance, gait, and endurance    • Liver disease, granuloma 2/5/2014   • Lung nodule    • Malignant neoplasm (CMS/HCC) 5 YEARSAGO    SKIN CANCER ON LT  FOREHEAD   • Mitral valve regurgitation 7/16/2015    Moderate per ECHO 7/13/2015    • Myocardial infarct (CMS/HCC)     6/2000---4 vessel bypass   • Other specified gastritis without mention of hemorrhage 8/24/2009   • Pneumonia    • Postsurgical aortocoronary bypass status 4/10/2012   • Schatzki's ring, Dilated 11/27/2018    Dr. Thurman   • Sciatica    • Syncope    • Wears glasses      Past Surgical History:   Procedure Laterality Date   • Anes chemosurg mohs tech 2nd stage up to 5 specmn Right 04/18/2018    Basal cell carcinoma right nasal tip    • Back surgery  2/10/2011    LAMINECTOMY   • Cabg, arterial, four+  6/2000   • Cardiac catheterization/possible ptca/possible stent  4/18/2012    preop clearance for back   • Cardiac catheterization/possible ptca/possible stent  02/14/2019   • Cardiac surgery     • Colonoscopy diagnostic  10/08/2003    Diverticulosis, otherwise examination was normal, f/u 10 yrs   • Colonoscopy diagnostic  10-    nml   • Dexa bone density axial skeleton  05/21/2010   • Egd  07/10/2020    repeat EGD 3 yr, hx Hooks's, Dr. Marroquin   • Esophagogastroduodenoscopy  6-2016    normal. repeat 3-5 yrs   • Esophagogastroduodenoscopy transoral flex w/bx single or mult  08/24/2009    short seg Barretts , gastritis, Dr Gibson  1yr   • Esophagogastroduodenoscopy transoral flex w/bx single or mult  06/02/2008    H pylori results not in chart-Esophageal mucosal changes consistent with short segment barretts esophagus, normal stomach, normal duodenum   • Esophagogastroduodenoscopy transoral flex w/bx single or mult  11/12/2007    H pylori negative-Distal esophagus bx-chronic esophagitis with intestinal metaplasia, focal glandular atypia, indefinite for low grade dysplasia   • Esophagogastroduodenoscopy transoral flex w/bx single or mult  06/05/2006    H pylori negative-Esophagus, lower/distal-barretts esophagus, no dysplasia is seen chronic inflammation of gastric type mucosa, esophageal mucosa with mild nonspecific reactive changes   • Esophagogastroduodenoscopy transoral flex w/bx single or mult  10-5-2010    barretts - sht segment   • Esophagogastroduodenoscopy transoral flex w/bx single or mult  10-    SS Barretts, no dysplasia   • Esophagogastroduodenoscopy transoral flex w/bx single or mult  4-2-2013    grossly nml esoph , gastritis   • Esophagogastroduodenoscopy transoral flex w/bx single or mult  11/27/2018    Dr. Thurman/ EGD/ Schatzki's Ring dilated and gastritis Recall 2 years   • Esophagoscopy transoral flex w/bx   3-    grossly nml.  Bxs at eg jx for barretts, 1 yr recall   • Esophagoscopy,ablation tumor  2012    SS Barretts - 30 ablations   • Eye surgery      SONIA CATARACT SURGERY   • Paravertebral facet inj jnt lumbar sacral 1  2018    Lumbar Facet / MBB #1   • Past surgical history  2001    retinal peal, left eye   • Removal of tonsils,<12 y/o  1949    Tonsillectomy Alone   • Remv cataract extracap insert lens  2001    Cataract Removal Lens Implant, left -one month after prior eye surgery   • Rev upper eyelid w excess skin  2006    Blepharoplasty, bilateral with an eyebrow tuck   • Rev upper eyelid w excess skin  2007    Blepharoplasty, right eye   • Spine surgery procedure unlisted  2003    micro diskectomy of S2; knicked S1 nerve root   • Thoracentesis     • Tonsillectomy and adenoidectomy  AGE 7     Family History   Problem Relation Age of Onset   • Stroke Father    • Heart disease Mother    • Heart Mother    • Diabetes Sister    • Heart disease Sister    • Cancer Sister         Breast cancer   • Diabetes Other    • Cancer Other 40         of melanoma     Social History     Tobacco Use   • Smoking status: Former Smoker     Packs/day: 2.00     Years: 22.00     Pack years: 44.00     Types: Cigarettes     Start date:      Quit date:      Years since quittin.1   • Smokeless tobacco: Never Used   Substance Use Topics   • Alcohol use: Yes     Frequency: 4 or more times a week     Drinks per session: 3 or 4     Binge frequency: Never       Prior to Admission Meds:(Not in a hospital admission)    Scheduled Meds:  Continuous Infusions:  PRN Meds:    Allergies:   ALLERGIES:   Allergen Reactions   • Tetanus Toxoid SHORTNESS OF BREATH and Other (See Comments)     tachycardia   • Chlorhexidine RASH       Active Problems:    * No active hospital problems. *    Blood pressure (!) 160/75, pulse 69, temperature 97.8 °F (36.6 °C), temperature source Oral, resp.  rate 18, height 6' 1\" (1.854 m), weight 102.9 kg, SpO2 98 %.    Review of Systems   Constitutional: Positive for activity change. Negative for appetite change, chills, diaphoresis, fatigue, fever and unexpected weight change.   HENT: Negative.    Eyes: Negative for photophobia and visual disturbance.   Respiratory: Negative for cough and shortness of breath.    Cardiovascular: Negative for leg swelling.   Gastrointestinal: Negative for abdominal pain, constipation, nausea and vomiting.   Genitourinary: Negative for difficulty urinating, dysuria, flank pain and frequency.   Musculoskeletal: Positive for back pain. Negative for gait problem, joint swelling and myalgias.   Skin: Negative for rash.   Neurological: Positive for numbness. Negative for dizziness, seizures, weakness, light-headedness and headaches.   Hematological: Does not bruise/bleed easily.   Psychiatric/Behavioral: Negative for suicidal ideas.        Physical Exam   Constitutional: He is oriented to person, place, and time. He appears well-developed and well-nourished. No distress.   HENT:   Head: Normocephalic and atraumatic.   Eyes: Pupils are equal, round, and reactive to light. No scleral icterus.   Neck: Neck supple. No thyromegaly present.   Abdominal: Soft. He exhibits no mass. There is no abdominal tenderness. There is no guarding.   Neurological: He is alert and oriented to person, place, and time. No cranial nerve deficit. He exhibits normal muscle tone.   Skin: No rash noted. He is not diaphoretic.   Psychiatric: He has a normal mood and affect. His behavior is normal.       Assessment:  LUMBAR RADICULOPATHY    Plan:  LUMBAR MAYRA    Sage Alcocer MD  2/22/2021

## 2025-01-02 NOTE — Clinical Note
The left DP pulse is detected w/ doppler. The right DP pulse is detected w/ doppler. The left PT pulse is detected w/ doppler. The right PT pulse is detected w/ doppler. The femoral pulses are +1 bilaterally.

## 2025-01-02 NOTE — Clinical Note
10 total pulses delivered;   Maximum Pressure achieved was 4 indigo and   maximum inflation time of 10 seconds.

## 2025-01-06 ENCOUNTER — DOCUMENTATION (OUTPATIENT)
Dept: CARDIAC REHAB | Facility: HOSPITAL | Age: 65
End: 2025-01-06
Payer: MEDICARE

## 2025-01-06 NOTE — PROGRESS NOTES
Thank you for your referral to [facility]   We do not offer PAD program at this time

## 2025-01-21 ENCOUNTER — OFFICE VISIT (OUTPATIENT)
Dept: CARDIOLOGY | Facility: CLINIC | Age: 65
End: 2025-01-21
Payer: MEDICARE

## 2025-01-21 VITALS
DIASTOLIC BLOOD PRESSURE: 83 MMHG | HEIGHT: 64 IN | SYSTOLIC BLOOD PRESSURE: 133 MMHG | OXYGEN SATURATION: 90 % | BODY MASS INDEX: 22.02 KG/M2 | WEIGHT: 129 LBS | HEART RATE: 101 BPM

## 2025-01-21 DIAGNOSIS — I73.9 PERIPHERAL ARTERY DISEASE: Primary | Chronic | ICD-10-CM

## 2025-01-21 DIAGNOSIS — E78.5 DYSLIPIDEMIA: Chronic | ICD-10-CM

## 2025-01-21 NOTE — PROGRESS NOTES
"Chief Complaint  Follow-up (Denies CP, no cardiac symptoms.) and Med Management (Tolerating all current medications.)    Subjective          Grecia Galicia presents to [facility] for follow up.    History of Present Illness  Ms. Galicia is being seen in follow-up after having undergone peripheral angiogram with Dr. Osorio 01/02/2025.  He performed balloon angioplasty of the right iliac artery and IVUS with shockwave lithotripsy and balloon angioplasty with drug-coated balloon of the left iliac.  He did note residual disease in the femorofemoral bypasses at the anastomosis.  Plan is to evaluate for symptoms and potentially perform procedure on those blockages at a later date.  History of Present Illness  She reports a significant improvement in her condition following the procedure, with a notable reduction in pain.  She is not experiencing any discomfort at the site of the intervention.  She has an upcoming appointment with BUDDY Rios, who is monitoring her heart disease, next month.    She is tolerating medication for cholesterol management.  Discussed her LDL goal of less than 55 and encouraged control of lifestyle factors.    She a prediabetic and is taking metformin to prevent its progression.    SOCIAL HISTORY  The patient has not smoked for 4 years.           Tobacco Use: Medium Risk (1/21/2025)    Patient History     Smoking Tobacco Use: Former     Smokeless Tobacco Use: Never     Passive Exposure: Not on file       Objective     Vital Signs:   /83 (BP Location: Left arm, Patient Position: Sitting, Cuff Size: Adult)   Pulse 101   Ht 162.6 cm (64\")   Wt 58.5 kg (129 lb)   SpO2 90%   BMI 22.14 kg/m²       Physical Exam  Vitals and nursing note reviewed.   Constitutional:       General: She is not in acute distress.     Appearance: She is obese.   HENT:      Head: Normocephalic and atraumatic.   Neck:      Vascular: No carotid bruit.   Cardiovascular: "      Rate and Rhythm: Normal rate and regular rhythm.      Pulses:           Posterior tibial pulses are 1+ on the right side and 1+ on the left side.      Heart sounds: No murmur heard.     No friction rub. No gallop.   Pulmonary:      Effort: Pulmonary effort is normal.      Breath sounds: Normal breath sounds.   Musculoskeletal:      Right lower leg: No edema.      Left lower leg: No edema.   Skin:     General: Skin is warm and dry.   Neurological:      General: No focal deficit present.      Mental Status: She is alert.   Psychiatric:         Mood and Affect: Mood normal.         Behavior: Behavior normal.             Result Review :   The following data was reviewed by: BUDDY Boyd on 01/21/2025:  Common labs          12/30/2024    08:44   Common Labs   Glucose 104    BUN 19    Creatinine 0.87    Sodium 143    Potassium 4.7    Chloride 106    Calcium 9.8    WBC 6.84    Hemoglobin 14.0    Hematocrit 45.5    Platelets 230        Data reviewed : Cardiology studies as detailed below      Last Cardiac Cath  Results for orders placed during the hospital encounter of 01/02/25    Cardiac Catheterization/Vascular Study    Conclusion  PERIPHERAL ARTERIOGRAM / INTERVENTION REPORT    DATE OF PROCEDURE: 01/02/25    INDICATION FOR PROCEDURE:  Patient with history of femorofemoral bypass and ongoing rest leg pain with severe stenosis seen in her iliac arteries on CT scan    PROCEDURE PERFORMED:  Ultrasound-guided access to the left and right femoral artery  Abdominal aortic angiogram  Selective left and right femoral angiogram  Balloon angioplasty of the right iliac artery  IVUS with shockwave lithotripsy and balloon angioplasty with drug-coated balloon angioplasty of the left iliac    PROCEDURE COMMENTS:    Patient was brought to the cath lab and placed on the cardiac catherization table.  Moderate conscious utilized for 65 minutes for duration of procedure I supervised the ministration fentanyl and Versed that was  given independently by the registered nurse.  We obtained ultrasound-guided access careful to hit the common femoral and avoid the bypass in the left and right femoral artery.  We were able to get our wires up into the iliacs on both sides.  We did angiograms and then subsequently went up with a pigtail catheter through the left to do abdominal aortic angiogram and subsequently proceeded to intervention  We gave heparin for anticoagulation and decided attempt to see if we could open the right iliac.  We advanced our wire through the right sheath however we were subintimal near the aorta and we tried to come from above through the abdominal aorta with a Glidewire advantage into the right iliac however we were getting into the small branches.  We advanced a 4 mm balloon into the right iliac to perform balloon angioplasty of the right common iliac.  We attempted to wire with a  approach wire retrograde however we kept getting subintimal and subsequently we abandon our approach and focused on the left iliac.  We left an 014 wire in and performed IVUS of the left iliac.  We had used a 4 mm balloon to create a tract in the left iliac and subsequently performed IVUS assessing for the degree of calcium and the size of the vessel.  We then used a 6 mm shockwave balloon delivering 200 pulses in the left common and left external iliac.  We then used a 6 x 120 mm Magnolia balloon inflating for 2 minutes in the left common and left external iliac.  Angiographically there is minimal residual stenosis and markedly improved flow.  We placed the Omni Flush to take a final angiogram in the abdominal aorta.  We subsequently sutured the sheets in place      Findings:  Right iliac is 100% occluded  Left ostial iliac has a 95% stenosis.  Diffuse severe disease in left iliac.  Left SFA is patent with three-vessel runoff  There is a left femorofemoral bypass however both areas have anastomotic disease of approximately  80%                  CONCLUSION:  Successful balloon angioplasty and shockwave lithotripsy of the left iliac with restoration of flow.  The right iliac is occluded however there is a  Fem to Fem bypass present.  There is residual disease in the femorofemoral bypasses at the anastomosis.  If she has ongoing symptoms we will plan to bring her back to treat both anastomoses      EBL: 20 ML  No specimens were removed.      Ren Osorio MD      Last Stress test  Results for orders placed during the hospital encounter of 03/07/19    Stress Test With Myocardial Perfusion    Interpretation Summary  · Findings consistent with a normal ECG stress test.  · Myocardial perfusion imaging indicates a normal myocardial perfusion study with no evidence of ischemia.  · Gated scans were not completed.  · Impressions are consistent with a low risk study.       Last Echo  Results for orders placed during the hospital encounter of 11/01/17    Adult Transthoracic Echo Complete W/ Cont if Necessary Per Protocol    Interpretation Summary  · Estimated EF appears to be in the range of 56 - 60%. Normal left ventricular cavity size and wall thickness noted. All left ventricular wall segments contract normally.  · No aortic stenosis  · Trace mitral valve regurgitation  · Trace tricuspid valve regurgitation  · There is no evidence of pericardial effusion.    Echo study dated 10/09/2014, reports similar EF of 55-60%             Current Outpatient Medications   Medication Sig Dispense Refill    Accu-Chek Sailaja Plus test strip CHECK BLOOD SUGAR MEDICARE PAYS FOR TESTING UP TO THREE TIMES DAILY FOR INSULIN DEPENDENT PATIENTS IF PRESCRIBED      albuterol (PROVENTIL HFA;VENTOLIN HFA) 108 (90 BASE) MCG/ACT inhaler Inhale 2 puffs Every 4 (Four) Hours As Needed for Wheezing.      alendronate (FOSAMAX) 70 MG tablet Take 1 tablet by mouth Daily.      amLODIPine (NORVASC) 5 MG tablet Take 1 tablet by mouth Daily. 30 tablet 3    AquaLance Lancets 30G misc  USE TO CHECK BLOOD SUGAR LEVEL THREE TIMES A DAY (MEDICARE PAYS FOR TESTING ONCE DAILY FOR NON-INSULIN DEPENDENT PATIENTS)      aspirin 81 MG EC tablet Take 1 tablet by mouth Daily. 90 tablet 3    atorvastatin (LIPITOR) 40 MG tablet Take 1 tablet by mouth Daily. 60 tablet 1    budesonide-formoterol (SYMBICORT) 160-4.5 MCG/ACT inhaler Inhale 2 puffs 2 (Two) Times a Day.      carvedilol (COREG) 25 MG tablet take one tablet BY MOUTH TWICE DAILY WITH A MEAL 60 tablet 0    clopidogrel (PLAVIX) 75 MG tablet Take 1 tablet by mouth Daily. 30 tablet 3    famotidine (Pepcid) 20 MG tablet Take 1 tablet by mouth 2 (Two) Times a Day. 60 tablet 3    loratadine (CLARITIN) 10 MG tablet Take 1 tablet by mouth Every Night.      metFORMIN (GLUCOPHAGE) 500 MG tablet Take 1 tablet by mouth Daily. for blood sugar      montelukast (SINGULAIR) 10 MG tablet TAKE ONE TABLET BY MOUTH EVERY EVENING FOR ALLERGIES      naproxen (NAPROSYN) 500 MG tablet Take 1 tablet by mouth 2 (Two) Times a Day. 60 tablet 2    nitroglycerin (NITROSTAT) 0.4 MG SL tablet Place 1 tablet under the tongue Every 5 (Five) Minutes As Needed for Chest Pain for up to 25 doses. 1tab q 5mins X3. 25 tablet 1    fluticasone (FLONASE) 50 MCG/ACT nasal spray Administer 2 sprays into the nostril(s) as directed by provider Daily. (Patient not taking: Reported on 1/21/2025)      Trelegy Ellipta 100-62.5-25 MCG/INH inhaler INHALE ONE PUFF BY MOUTH EVERY DAY FOR BREATHING (Patient not taking: Reported on 1/21/2025)       No current facility-administered medications for this visit.            Assessment and Plan    Problem List Items Addressed This Visit       Dyslipidemia (Chronic)    Overview     Goals of care-LDL less than 55  11/02/2024-total cholesterol 128, triglycerides 106, HDL 39, LDL 69           Current Assessment & Plan     Dyslipidemia is  not quite at goal  Goals of care: Medication compliance and tolerance, Control progression of disease, and Maintain LDL <55  Plan:  Continue current medications and recommend lifestyle adjustments  Follow up: in 6 months               Peripheral artery disease - Primary (Chronic)    Overview     Goals of care-aggressive risk factor modification to mitigate disease progression  Timing unknown-history of femorofemoral bypass and right iliac stent  11/07/2014 -CTA aorta bilateral iliofemoral with runoff - complete occlusion of the right external iliac artery with  reconstitution of the right proximal common femoral artery secondary to  collateralized right internal iliac artery vessels. Mod-severe stenosis distal right internal iliac artery. Extensive plaque in left common and external iliac arteries with mod-severe stenosis.  Patent bilateral superficial femoral arteries   6/24/2024-CTA aorta bilateral iliofemoral with runoff - Right common iliac stenosis and external iliac occlusion.   Patent femoral-femoral bypass without significant outflow disease.  Distal left common iliac stenosis 70 percent  01/02/2025-peripheral arteriogram/intervention - Balloon angioplasty of the right iliac artery IVUS with shockwave lithotripsy and balloon angioplasty with drug-coated balloon angioplasty of the left iliac.  Residual disease in the femorofemoral bypass is at the anastomosis         Current Assessment & Plan     Symptoms improved.  Continuing current meds at current doses          Diagnoses and all orders for this visit:    1. Peripheral artery disease (Primary)  Assessment & Plan:  Symptoms improved.  Continuing current meds at current doses      2. Dyslipidemia  Assessment & Plan:  Dyslipidemia is  not quite at goal  Goals of care: Medication compliance and tolerance, Control progression of disease, and Maintain LDL <55  Plan: Continue current medications and recommend lifestyle adjustments  Follow up: in 6 months            I have assumed longitudinal care for complex medical condition(s) that require long-term management involving monitoring and  adjustments to medications.  Goals of care, history of important events, and historical results can be found with each problem.  Episodic plan of care can be found in the order section.  Assessment & Plan  1. Peripheral artery disease.  Managing risk factors is crucial for controlling her peripheral artery disease.  She reports significant improvement in leg pain post-procedure. There is no pain at the site of the procedure.  She is advised to continue managing her cholesterol and blood pressure to prevent worsening of the condition.  She is also advised to maintain his non-smoking status, which she has upheld for four years.    2. Cholesterol management.  She is currently on medication for cholesterol management, and should pursue diet and exercise methods to get her to goal.  If that does not work, we may need to increase dose of statin or add PCSK9 inhibitor therapy    3. Diabetes mellitus.  Encouraged close monitoring    Follow-up  The patient will follow up in 6 months.         Follow Up     No follow-ups on file.    Patient was given instructions and counseling regarding her condition or for health maintenance advice. Please see specific information pulled into the AVS if appropriate.       Electronically signed by BUDDY Boyd, 01/21/25, 12:15 PM EST.    Patient or patient representative verbalized consent for the use of Ambient Listening during the visit with  BUDDY Boyd for chart documentation. 1/21/2025  12:15 EST     Dictated Utilizing Dragon Dictation: Part of this note may be an electronic transcription/translation of spoken language to printed text using the Dragon Dictation System

## 2025-01-21 NOTE — ASSESSMENT & PLAN NOTE
Dyslipidemia is  not quite at goal  Goals of care: Medication compliance and tolerance, Control progression of disease, and Maintain LDL <55  Plan: Continue current medications and recommend lifestyle adjustments  Follow up: in 6 months

## 2025-02-05 ENCOUNTER — OFFICE VISIT (OUTPATIENT)
Dept: CARDIOLOGY | Facility: CLINIC | Age: 65
End: 2025-02-05
Payer: MEDICARE

## 2025-02-05 ENCOUNTER — TELEPHONE (OUTPATIENT)
Dept: CARDIOLOGY | Facility: CLINIC | Age: 65
End: 2025-02-05
Payer: MEDICARE

## 2025-02-05 VITALS
WEIGHT: 128.2 LBS | DIASTOLIC BLOOD PRESSURE: 78 MMHG | BODY MASS INDEX: 21.89 KG/M2 | SYSTOLIC BLOOD PRESSURE: 133 MMHG | HEART RATE: 108 BPM | OXYGEN SATURATION: 91 % | HEIGHT: 64 IN

## 2025-02-05 DIAGNOSIS — L73.9 FOLLICULITIS: Primary | ICD-10-CM

## 2025-02-05 PROCEDURE — 99213 OFFICE O/P EST LOW 20 MIN: CPT | Performed by: NURSE PRACTITIONER

## 2025-02-05 PROCEDURE — 3075F SYST BP GE 130 - 139MM HG: CPT | Performed by: NURSE PRACTITIONER

## 2025-02-05 PROCEDURE — 1159F MED LIST DOCD IN RCRD: CPT | Performed by: NURSE PRACTITIONER

## 2025-02-05 PROCEDURE — 3078F DIAST BP <80 MM HG: CPT | Performed by: NURSE PRACTITIONER

## 2025-02-05 PROCEDURE — 1160F RVW MEDS BY RX/DR IN RCRD: CPT | Performed by: NURSE PRACTITIONER

## 2025-02-05 RX ORDER — MUPIROCIN 20 MG/G
1 OINTMENT TOPICAL 2 TIMES DAILY
Qty: 30 G | Refills: 0 | Status: SHIPPED | OUTPATIENT
Start: 2025-02-05

## 2025-02-05 NOTE — TELEPHONE ENCOUNTER
Daughter called in stating that yesterday, patients stitching on the top part of the stomach popped open and green stuff came out and that it's swollen and red, wants to know if she needs to be seen

## 2025-02-15 NOTE — PROGRESS NOTES
"Chief Complaint  Follow-up (Denies CP, Complains of SOA constantly.) and Med Management (Tolerating all current medications.)    Subjective          Grecia Galicia presents to [facility] for follow up.    History of Present Illness  History of Present Illness  The patient presents out of concern that she developed an infection in her left femoral cath site.    She had a tender area on her skin that ruptured of the lesion during her sleep last night, which resulted in the discharge of a yellowish substance. Upon awakening this morning, she discovered bloodstains on her underwear, necessitating assistance from her daughter for examination due to her inability to inspect the area herself.           Tobacco Use: Medium Risk (2/15/2025)    Patient History     Smoking Tobacco Use: Former     Smokeless Tobacco Use: Never     Passive Exposure: Not on file       Objective     Vital Signs:   /78 (BP Location: Left arm, Patient Position: Sitting, Cuff Size: Adult)   Pulse 108   Ht 162.6 cm (64\")   Wt 58.2 kg (128 lb 3.2 oz)   SpO2 91%   BMI 22.01 kg/m²       Physical Exam  Vitals and nursing note reviewed.   Constitutional:       General: She is not in acute distress.     Appearance: She is obese.   HENT:      Head: Normocephalic and atraumatic.   Pulmonary:      Effort: Pulmonary effort is normal.   Musculoskeletal:      Right lower leg: No edema.      Left lower leg: No edema.   Skin:     General: Skin is warm and dry.      Comments: Single area of folliculitis on the left side of her panus.  No pustule at this time.  Mild erythema present.  Left femoral cath site healing well.  Wound edges well approximated.  No edema, erythema or drainage   Neurological:      General: No focal deficit present.      Mental Status: She is alert.   Psychiatric:         Mood and Affect: Mood normal.         Behavior: Behavior normal.             Result Review :            Last Cardiac " Cath  Results for orders placed during the hospital encounter of 01/02/25    Cardiac Catheterization/Vascular Study    Conclusion  PERIPHERAL ARTERIOGRAM / INTERVENTION REPORT    DATE OF PROCEDURE: 01/02/25    INDICATION FOR PROCEDURE:  Patient with history of femorofemoral bypass and ongoing rest leg pain with severe stenosis seen in her iliac arteries on CT scan    PROCEDURE PERFORMED:  Ultrasound-guided access to the left and right femoral artery  Abdominal aortic angiogram  Selective left and right femoral angiogram  Balloon angioplasty of the right iliac artery  IVUS with shockwave lithotripsy and balloon angioplasty with drug-coated balloon angioplasty of the left iliac    PROCEDURE COMMENTS:    Patient was brought to the cath lab and placed on the cardiac catherization table.  Moderate conscious utilized for 65 minutes for duration of procedure I supervised the ministration fentanyl and Versed that was given independently by the registered nurse.  We obtained ultrasound-guided access careful to hit the common femoral and avoid the bypass in the left and right femoral artery.  We were able to get our wires up into the iliacs on both sides.  We did angiograms and then subsequently went up with a pigtail catheter through the left to do abdominal aortic angiogram and subsequently proceeded to intervention  We gave heparin for anticoagulation and decided attempt to see if we could open the right iliac.  We advanced our wire through the right sheath however we were subintimal near the aorta and we tried to come from above through the abdominal aorta with a Glidewire advantage into the right iliac however we were getting into the small branches.  We advanced a 4 mm balloon into the right iliac to perform balloon angioplasty of the right common iliac.  We attempted to wire with a  approach wire retrograde however we kept getting subintimal and subsequently we abandon our approach and focused on the left iliac.  We  left an 014 wire in and performed IVUS of the left iliac.  We had used a 4 mm balloon to create a tract in the left iliac and subsequently performed IVUS assessing for the degree of calcium and the size of the vessel.  We then used a 6 mm shockwave balloon delivering 200 pulses in the left common and left external iliac.  We then used a 6 x 120 mm [place] balloon inflating for 2 minutes in the left common and left external iliac.  Angiographically there is minimal residual stenosis and markedly improved flow.  We placed the Omni Flush to take a final angiogram in the abdominal aorta.  We subsequently sutured the sheets in place      Findings:  Right iliac is 100% occluded  Left ostial iliac has a 95% stenosis.  Diffuse severe disease in left iliac.  Left SFA is patent with three-vessel runoff  There is a left femorofemoral bypass however both areas have anastomotic disease of approximately 80%                  CONCLUSION:  Successful balloon angioplasty and shockwave lithotripsy of the left iliac with restoration of flow.  The right iliac is occluded however there is a  Fem to Fem bypass present.  There is residual disease in the femorofemoral bypasses at the anastomosis.  If she has ongoing symptoms we will plan to bring her back to treat both anastomoses      EBL: 20 ML  No specimens were removed.      Ren Osorio MD      Last Stress test  Results for orders placed during the hospital encounter of 03/07/19    Stress Test With Myocardial Perfusion    Interpretation Summary  · Findings consistent with a normal ECG stress test.  · Myocardial perfusion imaging indicates a normal myocardial perfusion study with no evidence of ischemia.  · Gated scans were not completed.  · Impressions are consistent with a low risk study.       Last Echo  Results for orders placed during the hospital encounter of 11/01/17    Adult Transthoracic Echo Complete W/ Cont if Necessary Per Protocol    Interpretation Summary  · Estimated EF  appears to be in the range of 56 - 60%. Normal left ventricular cavity size and wall thickness noted. All left ventricular wall segments contract normally.  · No aortic stenosis  · Trace mitral valve regurgitation  · Trace tricuspid valve regurgitation  · There is no evidence of pericardial effusion.    Echo study dated 10/09/2014, reports similar EF of 55-60%             Current Outpatient Medications   Medication Sig Dispense Refill    Accu-Chek Sailaja Plus test strip CHECK BLOOD SUGAR MEDICARE PAYS FOR TESTING UP TO THREE TIMES DAILY FOR INSULIN DEPENDENT PATIENTS IF PRESCRIBED      albuterol (PROVENTIL HFA;VENTOLIN HFA) 108 (90 BASE) MCG/ACT inhaler Inhale 2 puffs Every 4 (Four) Hours As Needed for Wheezing.      alendronate (FOSAMAX) 70 MG tablet Take 1 tablet by mouth Daily.      amLODIPine (NORVASC) 5 MG tablet Take 1 tablet by mouth Daily. 30 tablet 3    AquaLance Lancets 30G misc USE TO CHECK BLOOD SUGAR LEVEL THREE TIMES A DAY (MEDICARE PAYS FOR TESTING ONCE DAILY FOR NON-INSULIN DEPENDENT PATIENTS)      aspirin 81 MG EC tablet Take 1 tablet by mouth Daily. 90 tablet 3    atorvastatin (LIPITOR) 40 MG tablet Take 1 tablet by mouth Daily. 60 tablet 1    budesonide-formoterol (SYMBICORT) 160-4.5 MCG/ACT inhaler Inhale 2 puffs 2 (Two) Times a Day.      carvedilol (COREG) 25 MG tablet take one tablet BY MOUTH TWICE DAILY WITH A MEAL 60 tablet 0    clopidogrel (PLAVIX) 75 MG tablet Take 1 tablet by mouth Daily. 30 tablet 3    famotidine (Pepcid) 20 MG tablet Take 1 tablet by mouth 2 (Two) Times a Day. 60 tablet 3    fluticasone (FLONASE) 50 MCG/ACT nasal spray Administer 2 sprays into the nostril(s) as directed by provider Daily.      loratadine (CLARITIN) 10 MG tablet Take 1 tablet by mouth Every Night.      metFORMIN (GLUCOPHAGE) 500 MG tablet Take 1 tablet by mouth Daily. for blood sugar      montelukast (SINGULAIR) 10 MG tablet TAKE ONE TABLET BY MOUTH EVERY EVENING FOR ALLERGIES      naproxen (NAPROSYN) 500  MG tablet Take 1 tablet by mouth 2 (Two) Times a Day. 60 tablet 2    nitroglycerin (NITROSTAT) 0.4 MG SL tablet Place 1 tablet under the tongue Every 5 (Five) Minutes As Needed for Chest Pain for up to 25 doses. 1tab q 5mins X3. 25 tablet 1    Trelegy Ellipta 100-62.5-25 MCG/INH inhaler       mupirocin (BACTROBAN) 2 % ointment Apply 1 Application topically to the appropriate area as directed 2 (Two) Times a Day. 30 g 0     No current facility-administered medications for this visit.            Assessment and Plan    Problem List Items Addressed This Visit    None  Visit Diagnoses       Folliculitis    -  Primary    Relevant Medications    mupirocin (BACTROBAN) 2 % ointment          Diagnoses and all orders for this visit:    1. Folliculitis (Primary)  -     mupirocin (BACTROBAN) 2 % ointment; Apply 1 Application topically to the appropriate area as directed 2 (Two) Times a Day.  Dispense: 30 g; Refill: 0      Assessment & Plan  1. Ingrown hair.  The patient presented with a lesion that was initially suspected to be related to a recent surgery. Upon examination, it was identified as an ingrown hair with no remaining pus. An ointment was prescribed to be applied twice daily. She was advised to use a minimal amount of the ointment for each application. The patient was reassured that this condition is easily manageable and not related to the surgical incision.         Follow Up     No follow-ups on file.    Patient was given instructions and counseling regarding her condition or for health maintenance advice. Please see specific information pulled into the AVS if appropriate.       Electronically signed by BUDDY Boyd, 02/15/25, 3:28 PM EST.    Patient or patient representative verbalized consent for the use of Ambient Listening during the visit with  BUDDY Boyd for chart documentation. 2/15/2025  15:28 EST     Dictated Utilizing Dragon Dictation: Part of this note may be an electronic  transcription/translation of spoken language to printed text using the Dragon Dictation System

## 2025-02-18 ENCOUNTER — OFFICE VISIT (OUTPATIENT)
Dept: CARDIOLOGY | Facility: CLINIC | Age: 65
End: 2025-02-18
Payer: MEDICARE

## 2025-02-18 VITALS
DIASTOLIC BLOOD PRESSURE: 70 MMHG | SYSTOLIC BLOOD PRESSURE: 127 MMHG | BODY MASS INDEX: 22.02 KG/M2 | HEART RATE: 70 BPM | HEIGHT: 64 IN | OXYGEN SATURATION: 91 % | WEIGHT: 129 LBS

## 2025-02-18 DIAGNOSIS — E78.5 DYSLIPIDEMIA: Chronic | ICD-10-CM

## 2025-02-18 DIAGNOSIS — I10 ESSENTIAL HYPERTENSION: ICD-10-CM

## 2025-02-18 DIAGNOSIS — I73.9 PERIPHERAL ARTERY DISEASE: Primary | Chronic | ICD-10-CM

## 2025-02-18 DIAGNOSIS — I25.10 ASCVD (ARTERIOSCLEROTIC CARDIOVASCULAR DISEASE): ICD-10-CM

## 2025-02-18 PROCEDURE — 3078F DIAST BP <80 MM HG: CPT | Performed by: NURSE PRACTITIONER

## 2025-02-18 PROCEDURE — 1160F RVW MEDS BY RX/DR IN RCRD: CPT | Performed by: NURSE PRACTITIONER

## 2025-02-18 PROCEDURE — 3074F SYST BP LT 130 MM HG: CPT | Performed by: NURSE PRACTITIONER

## 2025-02-18 PROCEDURE — 1159F MED LIST DOCD IN RCRD: CPT | Performed by: NURSE PRACTITIONER

## 2025-02-18 PROCEDURE — 99214 OFFICE O/P EST MOD 30 MIN: CPT | Performed by: NURSE PRACTITIONER

## 2025-02-18 RX ORDER — AMLODIPINE BESYLATE 5 MG/1
5 TABLET ORAL DAILY
Qty: 90 TABLET | Refills: 1 | Status: SHIPPED | OUTPATIENT
Start: 2025-02-18

## 2025-02-18 RX ORDER — CLOPIDOGREL BISULFATE 75 MG/1
75 TABLET ORAL DAILY
Qty: 90 TABLET | Refills: 3 | Status: SHIPPED | OUTPATIENT
Start: 2025-02-18

## 2025-02-18 RX ORDER — NITROGLYCERIN 0.4 MG/1
0.4 TABLET SUBLINGUAL
Qty: 25 TABLET | Refills: 1 | Status: SHIPPED | OUTPATIENT
Start: 2025-02-18

## 2025-02-18 NOTE — PROGRESS NOTES
[facility]             BUDDY Rios         Monday, Gaby Jonas, APRN  Grecia Galicia  1960 02/18/2025    Patient Active Problem List   Diagnosis    Essential hypertension    Pneumonia of both lower lobes due to infectious organism    Dyslipidemia    PVC's (premature ventricular contractions)    ASCVD (arteriosclerotic cardiovascular disease)    Critical limb ischemia of both lower extremities with rest pain    Peripheral artery disease       Dear Monday, Gaby Jonas, APRN:    Subjective     Chief Complaint   Patient presents with    Follow-up     routine       HPI:     This is a 64 y.o. female with known past medical history of acute MI with subsequent PCI in 2005, peripheral arterial disease status post intervention to the lower extremities with most recent balloon angioplasty and shockwave lithotripsy of the left iliac and January 2024, essential pretension, dyslipidemia and tobacco abuse.     Grecia Galicia presents today for routine cardiology follow up.   History of Present Illness    She reports a significant improvement in her leg pain following successful balloon angioplasty and shockwave lithotripsy of the left iliac in January 2025 by Dr. Collins. She is no longer experiencing instability or nocturnal leg pain, which previously disrupted her sleep due to cramping in the posterior aspect of her legs. She recently underwent laboratory testing under the supervision of her primary care physician, who informed her that her cholesterol levels were within normal limits.  Denies any chest pain or shortness of breath.  Blood pressure controlled.    MEDICATIONS  Amlodipine, aspirin, carvedilol, Plavix, nitroglycerin.       Diagnostic Testing  Echocardiogram 11/2017: EF 56 to 60%  Nuclear stress test 3/2019: No evidence of ischemia         All other systems were reviewed and were negative.    Patient Active Problem List   Diagnosis    Essential  hypertension    Pneumonia of both lower lobes due to infectious organism    Dyslipidemia    PVC's (premature ventricular contractions)    ASCVD (arteriosclerotic cardiovascular disease)    Critical limb ischemia of both lower extremities with rest pain    Peripheral artery disease       family history includes Heart attack in her mother; Heart disease in her brother.     reports that she quit smoking about 4 years ago. Her smoking use included cigarettes. She started smoking about 44 years ago. She has a 80 pack-year smoking history. She has never used smokeless tobacco. She reports that she does not drink alcohol and does not use drugs.    No Known Allergies      Current Outpatient Medications:     Accu-Chek Sailaja Plus test strip, CHECK BLOOD SUGAR MEDICARE PAYS FOR TESTING UP TO THREE TIMES DAILY FOR INSULIN DEPENDENT PATIENTS IF PRESCRIBED, Disp: , Rfl:     albuterol (PROVENTIL HFA;VENTOLIN HFA) 108 (90 BASE) MCG/ACT inhaler, Inhale 2 puffs Every 4 (Four) Hours As Needed for Wheezing., Disp: , Rfl:     alendronate (FOSAMAX) 70 MG tablet, Take 1 tablet by mouth Daily., Disp: , Rfl:     amLODIPine (NORVASC) 5 MG tablet, Take 1 tablet by mouth Daily., Disp: 90 tablet, Rfl: 1    AquaLance Lancets 30G misc, USE TO CHECK BLOOD SUGAR LEVEL THREE TIMES A DAY (MEDICARE PAYS FOR TESTING ONCE DAILY FOR NON-INSULIN DEPENDENT PATIENTS), Disp: , Rfl:     aspirin 81 MG EC tablet, Take 1 tablet by mouth Daily., Disp: 90 tablet, Rfl: 3    atorvastatin (LIPITOR) 40 MG tablet, Take 1 tablet by mouth Daily., Disp: 60 tablet, Rfl: 1    budesonide-formoterol (SYMBICORT) 160-4.5 MCG/ACT inhaler, Inhale 2 puffs 2 (Two) Times a Day., Disp: , Rfl:     carvedilol (COREG) 25 MG tablet, take one tablet BY MOUTH TWICE DAILY WITH A MEAL, Disp: 60 tablet, Rfl: 0    clopidogrel (PLAVIX) 75 MG tablet, Take 1 tablet by mouth Daily., Disp: 90 tablet, Rfl: 3    famotidine (Pepcid) 20 MG tablet, Take 1 tablet by mouth 2 (Two) Times a Day., Disp: 60  tablet, Rfl: 3    fluticasone (FLONASE) 50 MCG/ACT nasal spray, Administer 2 sprays into the nostril(s) as directed by provider Daily., Disp: , Rfl:     loratadine (CLARITIN) 10 MG tablet, Take 1 tablet by mouth Every Night., Disp: , Rfl:     metFORMIN (GLUCOPHAGE) 500 MG tablet, Take 1 tablet by mouth Daily. for blood sugar, Disp: , Rfl:     montelukast (SINGULAIR) 10 MG tablet, TAKE ONE TABLET BY MOUTH EVERY EVENING FOR ALLERGIES, Disp: , Rfl:     mupirocin (BACTROBAN) 2 % ointment, Apply 1 Application topically to the appropriate area as directed 2 (Two) Times a Day., Disp: 30 g, Rfl: 0    naproxen (NAPROSYN) 500 MG tablet, Take 1 tablet by mouth 2 (Two) Times a Day., Disp: 60 tablet, Rfl: 2    nitroglycerin (NITROSTAT) 0.4 MG SL tablet, Place 1 tablet under the tongue Every 5 (Five) Minutes As Needed for Chest Pain for up to 25 doses. 1tab q 5mins X3., Disp: 25 tablet, Rfl: 1    Trelegy Ellipta 100-62.5-25 MCG/INH inhaler, , Disp: , Rfl:       Physical Exam:  I have reviewed the patient's current vital signs as documented in the patient's EMR.   Vitals:    02/18/25 0919   BP: 127/70   Pulse: 70   SpO2: 91%     Body mass index is 22.13 kg/m².       02/18/25  0919   Weight: 58.5 kg (129 lb)      Physical Exam  Constitutional:       General: She is not in acute distress.     Appearance: Normal appearance. She is well-developed and normal weight.   HENT:      Head: Normocephalic and atraumatic.   Eyes:      General: Lids are normal.      Conjunctiva/sclera: Conjunctivae normal.      Pupils: Pupils are equal, round, and reactive to light.   Neck:      Vascular: No carotid bruit or JVD.   Cardiovascular:      Rate and Rhythm: Normal rate and regular rhythm.      Pulses: Normal pulses.      Heart sounds: Normal heart sounds, S1 normal and S2 normal. No murmur heard.  Pulmonary:      Effort: Pulmonary effort is normal. No respiratory distress.      Breath sounds: Normal breath sounds. No wheezing.   Abdominal:       General: Bowel sounds are normal. There is no distension.      Palpations: Abdomen is soft. There is no hepatomegaly or splenomegaly.      Tenderness: There is no abdominal tenderness.   Musculoskeletal:         General: No swelling. Normal range of motion.      Cervical back: Normal range of motion and neck supple.      Right lower leg: No edema.      Left lower leg: No edema.   Skin:     General: Skin is warm and dry.      Coloration: Skin is not jaundiced.      Findings: No rash.   Neurological:      General: No focal deficit present.      Mental Status: She is alert and oriented to person, place, and time. Mental status is at baseline.   Psychiatric:         Mood and Affect: Mood normal.         Speech: Speech normal.         Behavior: Behavior normal.         Thought Content: Thought content normal.         Judgment: Judgment normal.          DATA REVIEWED:     TTE/SAM:  Results for orders placed during the hospital encounter of 11/01/17    Adult Transthoracic Echo Complete W/ Cont if Necessary Per Protocol    Interpretation Summary  · Estimated EF appears to be in the range of 56 - 60%. Normal left ventricular cavity size and wall thickness noted. All left ventricular wall segments contract normally.  · No aortic stenosis  · Trace mitral valve regurgitation  · Trace tricuspid valve regurgitation  · There is no evidence of pericardial effusion.    Echo study dated 10/09/2014, reports similar EF of 55-60%      Laboratory evaluations:    Lab Results   Component Value Date    GLUCOSE 104 (H) 12/30/2024    BUN 19 12/30/2024    CREATININE 0.87 12/30/2024    EGFRIFNONA 84 02/16/2022    BCR 21.8 12/30/2024    K 4.7 12/30/2024    CO2 24.0 12/30/2024    CALCIUM 9.8 12/30/2024    ALBUMIN 4.70 02/16/2022    AST 15 02/16/2022    ALT 30 02/16/2022     Lab Results   Component Value Date    WBC 6.84 12/30/2024    HGB 14.0 12/30/2024    HCT 45.5 12/30/2024    MCV 95.4 12/30/2024     12/30/2024     Lab Results  "  Component Value Date    CHOL 158 02/16/2022    TRIG 83 02/16/2022    HDL 50 02/16/2022    LDL 92 02/16/2022     Lab Results   Component Value Date    TSH 1.060 02/16/2022     Lab Results   Component Value Date    HGBA1C 5.90 (H) 02/16/2022     Lab Results   Component Value Date    ALT 30 02/16/2022     Lab Results   Component Value Date    HGBA1C 5.90 (H) 02/16/2022    HGBA1C 6.01 (H) 03/10/2021    HGBA1C 5.80 (H) 01/04/2019     Lab Results   Component Value Date    CREATININE 0.87 12/30/2024     No results found for: \"IRON\", \"TIBC\", \"FERRITIN\"  Lab Results   Component Value Date    INR 1.00 12/14/2014    PROTIME 10.7 12/14/2014      No components found for: \"ABSOLUTELUNG\"    --------------------------------------------------------------------------------------------------------------------------    ASSESSMENT/PLAN:      Diagnosis Plan   1. Peripheral artery disease        2. ASCVD (arteriosclerotic cardiovascular disease)  clopidogrel (PLAVIX) 75 MG tablet      3. Essential hypertension        4. Dyslipidemia          Assessment & Plan  1.  Hypertension  Blood pressure well-controlled.  Labs request from PCP.  Continue current management    2.  Dyslipidemia  3.  ASCVD  4.  Patient  Patient recently underwent peripheral arteriogram in January 2025 for which she underwent successful balloon angioplasty and shockwave lithotripsy of the left iliac with restoration of flow with noted right iliac completely occluded however patient had fem to fem bypass present.  Reports her legs are feeling much better.  Continue aspirin and Plavix along with statin.  Labs requested from PCP.  LDL goal less than 70.      This document has been @Electronically signed by BUDDY Rios, 02/18/25, 8:53 AM EST.       Dictated Utilizing Dragon Dictation: Part of this note may be an electronic transcription/translation of spoken language to printed text using the Dragon Dictation System.    Patient or patient representative " verbalized consent for the use of Ambient Listening during the visit with  BUDDY Rios for chart documentation. 2/18/2025  09:44 EST    Follow-up appointment and medication changes provided in hand delivered After Visit Summary as well as reviewed in the room.

## 2025-02-19 ENCOUNTER — TELEPHONE (OUTPATIENT)
Dept: CARDIOLOGY | Facility: CLINIC | Age: 65
End: 2025-02-19
Payer: MEDICARE

## 2025-02-19 NOTE — TELEPHONE ENCOUNTER
Fax request for labs.       ----- Message from Farida Adorno sent at 2/18/2025  9:48 AM EST -----  Please request labs from PCP

## 2025-02-21 DIAGNOSIS — E78.5 DYSLIPIDEMIA: ICD-10-CM

## 2025-02-21 RX ORDER — ATORVASTATIN CALCIUM 80 MG/1
80 TABLET, FILM COATED ORAL DAILY
Qty: 90 TABLET | Refills: 1 | Status: SHIPPED | OUTPATIENT
Start: 2025-02-21

## 2025-05-27 ENCOUNTER — TRANSCRIBE ORDERS (OUTPATIENT)
Dept: ADMINISTRATIVE | Facility: HOSPITAL | Age: 65
End: 2025-05-27
Payer: MEDICARE

## 2025-05-27 DIAGNOSIS — R91.1 LUNG NODULE: Primary | ICD-10-CM

## 2025-06-04 ENCOUNTER — APPOINTMENT (OUTPATIENT)
Dept: CT IMAGING | Facility: HOSPITAL | Age: 65
End: 2025-06-04
Payer: MEDICARE

## 2025-06-04 ENCOUNTER — TELEPHONE (OUTPATIENT)
Dept: CARDIOLOGY | Facility: CLINIC | Age: 65
End: 2025-06-04

## 2025-06-04 ENCOUNTER — HOSPITAL ENCOUNTER (EMERGENCY)
Facility: HOSPITAL | Age: 65
Discharge: HOME | End: 2025-06-04
Attending: STUDENT IN AN ORGANIZED HEALTH CARE EDUCATION/TRAINING PROGRAM | Admitting: EMERGENCY MEDICINE
Payer: MEDICARE

## 2025-06-04 ENCOUNTER — APPOINTMENT (OUTPATIENT)
Dept: ULTRASOUND IMAGING | Facility: HOSPITAL | Age: 65
End: 2025-06-04
Payer: MEDICARE

## 2025-06-04 VITALS
SYSTOLIC BLOOD PRESSURE: 137 MMHG | HEIGHT: 59 IN | HEART RATE: 81 BPM | TEMPERATURE: 97.4 F | DIASTOLIC BLOOD PRESSURE: 78 MMHG | WEIGHT: 126 LBS | OXYGEN SATURATION: 91 % | RESPIRATION RATE: 18 BRPM | BODY MASS INDEX: 25.4 KG/M2

## 2025-06-04 DIAGNOSIS — S76.211A INGUINAL STRAIN, RIGHT, INITIAL ENCOUNTER: ICD-10-CM

## 2025-06-04 DIAGNOSIS — M79.604 RIGHT LEG PAIN: Primary | ICD-10-CM

## 2025-06-04 DIAGNOSIS — I73.9 PERIPHERAL ARTERIAL DISEASE: ICD-10-CM

## 2025-06-04 LAB
ALBUMIN SERPL-MCNC: 4.8 G/DL (ref 3.5–5.2)
ALBUMIN/GLOB SERPL: 1.5 G/DL
ALP SERPL-CCNC: 121 U/L (ref 39–117)
ALT SERPL W P-5'-P-CCNC: 15 U/L (ref 1–33)
ANION GAP SERPL CALCULATED.3IONS-SCNC: 13 MMOL/L (ref 5–15)
APTT PPP: 25.6 SECONDS (ref 24.5–35.9)
AST SERPL-CCNC: 19 U/L (ref 1–32)
BASOPHILS # BLD AUTO: 0.06 10*3/MM3 (ref 0–0.2)
BASOPHILS NFR BLD AUTO: 0.8 % (ref 0–1.5)
BILIRUB SERPL-MCNC: 0.4 MG/DL (ref 0–1.2)
BUN SERPL-MCNC: 15.5 MG/DL (ref 8–23)
BUN/CREAT SERPL: 18.9 (ref 7–25)
CALCIUM SPEC-SCNC: 10.3 MG/DL (ref 8.6–10.5)
CHLORIDE SERPL-SCNC: 103 MMOL/L (ref 98–107)
CO2 SERPL-SCNC: 24 MMOL/L (ref 22–29)
CREAT SERPL-MCNC: 0.82 MG/DL (ref 0.57–1)
DEPRECATED RDW RBC AUTO: 48.3 FL (ref 37–54)
EGFRCR SERPLBLD CKD-EPI 2021: 80.5 ML/MIN/1.73
EOSINOPHIL # BLD AUTO: 0.33 10*3/MM3 (ref 0–0.4)
EOSINOPHIL NFR BLD AUTO: 4.6 % (ref 0.3–6.2)
ERYTHROCYTE [DISTWIDTH] IN BLOOD BY AUTOMATED COUNT: 14.3 % (ref 12.3–15.4)
GLOBULIN UR ELPH-MCNC: 3.2 GM/DL
GLUCOSE SERPL-MCNC: 98 MG/DL (ref 65–99)
HCT VFR BLD AUTO: 43.9 % (ref 34–46.6)
HGB BLD-MCNC: 13.8 G/DL (ref 12–15.9)
HOLD SPECIMEN: NORMAL
HOLD SPECIMEN: NORMAL
IMM GRANULOCYTES # BLD AUTO: 0.01 10*3/MM3 (ref 0–0.05)
IMM GRANULOCYTES NFR BLD AUTO: 0.1 % (ref 0–0.5)
INR PPP: 0.88 (ref 0.9–1.1)
LYMPHOCYTES # BLD AUTO: 1.93 10*3/MM3 (ref 0.7–3.1)
LYMPHOCYTES NFR BLD AUTO: 26.8 % (ref 19.6–45.3)
MCH RBC QN AUTO: 28.9 PG (ref 26.6–33)
MCHC RBC AUTO-ENTMCNC: 31.4 G/DL (ref 31.5–35.7)
MCV RBC AUTO: 92 FL (ref 79–97)
MONOCYTES # BLD AUTO: 0.61 10*3/MM3 (ref 0.1–0.9)
MONOCYTES NFR BLD AUTO: 8.5 % (ref 5–12)
NEUTROPHILS NFR BLD AUTO: 4.27 10*3/MM3 (ref 1.7–7)
NEUTROPHILS NFR BLD AUTO: 59.2 % (ref 42.7–76)
NRBC BLD AUTO-RTO: 0 /100 WBC (ref 0–0.2)
PLATELET # BLD AUTO: 275 10*3/MM3 (ref 140–450)
PMV BLD AUTO: 11.2 FL (ref 6–12)
POTASSIUM SERPL-SCNC: 4.7 MMOL/L (ref 3.5–5.2)
PROT SERPL-MCNC: 8 G/DL (ref 6–8.5)
PROTHROMBIN TIME: 12.5 SECONDS (ref 12.5–15.2)
RBC # BLD AUTO: 4.77 10*6/MM3 (ref 3.77–5.28)
SODIUM SERPL-SCNC: 140 MMOL/L (ref 136–145)
WBC NRBC COR # BLD AUTO: 7.21 10*3/MM3 (ref 3.4–10.8)
WHOLE BLOOD HOLD COAG: NORMAL
WHOLE BLOOD HOLD SPECIMEN: NORMAL

## 2025-06-04 PROCEDURE — 85730 THROMBOPLASTIN TIME PARTIAL: CPT | Performed by: EMERGENCY MEDICINE

## 2025-06-04 PROCEDURE — 85610 PROTHROMBIN TIME: CPT | Performed by: EMERGENCY MEDICINE

## 2025-06-04 PROCEDURE — 85025 COMPLETE CBC W/AUTO DIFF WBC: CPT | Performed by: EMERGENCY MEDICINE

## 2025-06-04 PROCEDURE — 75635 CT ANGIO ABDOMINAL ARTERIES: CPT

## 2025-06-04 PROCEDURE — 25510000001 IOPAMIDOL PER 1 ML: Performed by: EMERGENCY MEDICINE

## 2025-06-04 PROCEDURE — 80053 COMPREHEN METABOLIC PANEL: CPT | Performed by: EMERGENCY MEDICINE

## 2025-06-04 PROCEDURE — 75635 CT ANGIO ABDOMINAL ARTERIES: CPT | Performed by: RADIOLOGY

## 2025-06-04 PROCEDURE — 93971 EXTREMITY STUDY: CPT

## 2025-06-04 PROCEDURE — 93926 LOWER EXTREMITY STUDY: CPT | Performed by: RADIOLOGY

## 2025-06-04 PROCEDURE — 93926 LOWER EXTREMITY STUDY: CPT

## 2025-06-04 PROCEDURE — 93971 EXTREMITY STUDY: CPT | Performed by: RADIOLOGY

## 2025-06-04 PROCEDURE — 99285 EMERGENCY DEPT VISIT HI MDM: CPT

## 2025-06-04 PROCEDURE — 36415 COLL VENOUS BLD VENIPUNCTURE: CPT

## 2025-06-04 RX ORDER — IOPAMIDOL 755 MG/ML
100 INJECTION, SOLUTION INTRAVASCULAR
Status: COMPLETED | OUTPATIENT
Start: 2025-06-04 | End: 2025-06-04

## 2025-06-04 RX ORDER — HYDROCODONE BITARTRATE AND ACETAMINOPHEN 5; 325 MG/1; MG/1
1 TABLET ORAL EVERY 8 HOURS PRN
Qty: 9 TABLET | Refills: 0 | Status: SHIPPED | OUTPATIENT
Start: 2025-06-04

## 2025-06-04 RX ORDER — HYDROCODONE BITARTRATE AND ACETAMINOPHEN 5; 325 MG/1; MG/1
1 TABLET ORAL ONCE
Refills: 0 | Status: COMPLETED | OUTPATIENT
Start: 2025-06-04 | End: 2025-06-04

## 2025-06-04 RX ORDER — DOXYCYCLINE 100 MG/1
100 CAPSULE ORAL ONCE
Status: COMPLETED | OUTPATIENT
Start: 2025-06-04 | End: 2025-06-04

## 2025-06-04 RX ORDER — SODIUM CHLORIDE 0.9 % (FLUSH) 0.9 %
10 SYRINGE (ML) INJECTION AS NEEDED
Status: DISCONTINUED | OUTPATIENT
Start: 2025-06-04 | End: 2025-06-04 | Stop reason: HOSPADM

## 2025-06-04 RX ORDER — DOXYCYCLINE 100 MG/1
100 CAPSULE ORAL EVERY 12 HOURS
Qty: 14 CAPSULE | Refills: 0 | Status: SHIPPED | OUTPATIENT
Start: 2025-06-04 | End: 2025-06-11

## 2025-06-04 RX ADMIN — IOPAMIDOL 80 ML: 755 INJECTION, SOLUTION INTRAVENOUS at 19:32

## 2025-06-04 RX ADMIN — DOXYCYCLINE 100 MG: 100 CAPSULE ORAL at 21:14

## 2025-06-04 RX ADMIN — HYDROCODONE BITARTRATE AND ACETAMINOPHEN 1 TABLET: 5; 325 TABLET ORAL at 21:14

## 2025-06-04 NOTE — ED PROVIDER NOTES
Subjective     History provided by:  Patient  Leg Pain  Location:  Leg  Time since incident:  1 week  Leg location:  R leg  Pain details:     Quality:  Aching    Radiates to:  Groin    Severity:  Moderate    Onset quality:  Gradual    Duration:  1 week    Timing:  Constant    Progression:  Worsening  Chronicity:  Recurrent  Dislocation: no    Foreign body present:  No foreign bodies  Relieved by:  Nothing  Associated symptoms: no fever        Review of Systems   Constitutional: Negative.  Negative for fever.   HENT: Negative.     Respiratory: Negative.     Cardiovascular: Negative.  Negative for chest pain.   Gastrointestinal: Negative.  Negative for abdominal pain.   Endocrine: Negative.    Genitourinary: Negative.  Negative for dysuria.   Musculoskeletal:  Positive for arthralgias.   Skin: Negative.    Neurological: Negative.    Psychiatric/Behavioral: Negative.     All other systems reviewed and are negative.      Past Medical History:   Diagnosis Date    Atherosclerotic cardiovascular disease     Diabetes mellitus     Dyslipidemia     Hypertension     Myocardial infarction     WITH STENTING IN 2007.     PAD (peripheral artery disease)        No Known Allergies    Past Surgical History:   Procedure Laterality Date    CARDIAC CATHETERIZATION N/A 1/2/2025    Procedure: Peripheral angiography;  Surgeon: Ren Osorio MD;  Location: [facility] CATH INVASIVE LOCATION;  Service: Cardiovascular;  Laterality: N/A;  Bilateral Iliacs    CARDIAC CATHETERIZATION N/A 1/2/2025    Procedure: Angioplasty-peripheral;  Surgeon: Ren Osorio MD;  Location:  [facility] CATH INVASIVE LOCATION;  Service: Cardiovascular;  Laterality: N/A;    CORONARY ANGIOPLASTY  2007    ALSO STENTING x1, RCA     FEMORAL FEMORAL BYPASS Right     ILIAC ARTERY STENT         Family History   Problem Relation Age of Onset    Heart attack Mother     Heart disease Brother        Social History     Socioeconomic History    Marital status:    Tobacco Use     Smoking status: Former     Current packs/day: 0.00     Average packs/day: 2.0 packs/day for 40.0 years (80.0 ttl pk-yrs)     Types: Cigarettes     Start date:      Quit date:      Years since quittin.4    Smokeless tobacco: Never    Tobacco comments:     QUIT DEC 2020   Vaping Use    Vaping status: Never Used   Substance and Sexual Activity    Alcohol use: No    Drug use: No    Sexual activity: Defer           Objective   Physical Exam  Vitals and nursing note reviewed.   Constitutional:       General: She is not in acute distress.     Appearance: She is well-developed. She is not diaphoretic.   HENT:      Head: Normocephalic and atraumatic.      Right Ear: External ear normal.      Left Ear: External ear normal.      Nose: Nose normal.   Eyes:      Conjunctiva/sclera: Conjunctivae normal.   Neck:      Vascular: No JVD.      Trachea: No tracheal deviation.   Cardiovascular:      Rate and Rhythm: Normal rate.      Heart sounds: No murmur heard.  Pulmonary:      Effort: Pulmonary effort is normal. No respiratory distress.      Breath sounds: No wheezing.   Abdominal:      Palpations: Abdomen is soft.      Tenderness: There is no abdominal tenderness.   Musculoskeletal:         General: Swelling and tenderness present. No deformity.      Cervical back: Normal range of motion and neck supple.      Comments: Pain w/ in rle, faint pedal pulse palpated   Skin:     General: Skin is warm and dry.      Coloration: Skin is not pale.      Findings: No erythema or rash.   Neurological:      Mental Status: She is alert and oriented to person, place, and time.      Cranial Nerves: No cranial nerve deficit.   Psychiatric:         Behavior: Behavior normal.         Thought Content: Thought content normal.         Procedures  CT Angio Abdominal Aorta Bilateral Iliofem Runoff   Final Result       Chronic occluded femoral to femoral bypass.   Chronic occluded right external iliac artery.   Significant stenoses involving the  proximal, mid, distal segments of the   right common iliac artery.   Moderate stenoses at the mid and distal segment of the left common iliac   artery primarily due to noncalcified plaque.   Moderate stenoses noted at the proximal, mid, and distal segments of the   left external iliac artery.   Reconstitution of flow to the right common femoral artery from   collateral vessels.   Patent two-vessel flow to the right ankle and right foot through the   right anterior tibial artery and right posterior tibial artery however   contrast volume within the distal right posterior tibial artery is   limited.   Moderate stenosis at the left common femoral artery due to calcified   plaque up to 50%.   Mild stenoses at the proximal and mid segments of the left SFA up to a   40% due to calcified plaque.   Patent two-vessel flow to the left ankle and left foot through the left   anterior tibial artery and left posterior tibial artery.   Calcified and noncalcified plaque along the infrarenal abdominal aorta   with up to 50% stenosis of the infrarenal abdominal aorta due to a   noncalcified plaque as seen on image 81, series 4.   Small size hiatal hernia.   Constipation noted throughout the right colon.   Fluid-filled distended bladder.   Mild sigmoid colon diverticulosis.               This report was finalized on 6/4/2025 8:27 PM by Saurav Hyde MD.          US Venous Doppler Lower Extremity Right (duplex)   Final Result   No DVT.       This report was finalized on 6/4/2025 3:07 PM by Dr. Lj Ogden MD.          US Arterial Doppler Lower Extremity Right   Final Result   1.  Partially imaged femoral-femoral bypass graft noted which appears to   reconstitute the distal common femoral artery.   2. No occlusive segments identified.   3. Low amplitude monophasic waveforms throughout compatible with diffuse   arterial inflow disease.       This report was finalized on 6/4/2025 3:09 PM by Dr. Lj Ogden MD.            Results  for orders placed or performed during the hospital encounter of 06/04/25   Comprehensive Metabolic Panel    Collection Time: 06/04/25  5:30 PM    Specimen: Blood   Result Value Ref Range    Glucose 98 65 - 99 mg/dL    BUN 15.5 8.0 - 23.0 mg/dL    Creatinine 0.82 0.57 - 1.00 mg/dL    Sodium 140 136 - 145 mmol/L    Potassium 4.7 3.5 - 5.2 mmol/L    Chloride 103 98 - 107 mmol/L    CO2 24.0 22.0 - 29.0 mmol/L    Calcium 10.3 8.6 - 10.5 mg/dL    Total Protein 8.0 6.0 - 8.5 g/dL    Albumin 4.8 3.5 - 5.2 g/dL    ALT (SGPT) 15 1 - 33 U/L    AST (SGOT) 19 1 - 32 U/L    Alkaline Phosphatase 121 (H) 39 - 117 U/L    Total Bilirubin 0.4 0.0 - 1.2 mg/dL    Globulin 3.2 gm/dL    A/G Ratio 1.5 g/dL    BUN/Creatinine Ratio 18.9 7.0 - 25.0    Anion Gap 13.0 5.0 - 15.0 mmol/L    eGFR 80.5 >60.0 mL/min/1.73   Protime-INR    Collection Time: 06/04/25  5:30 PM    Specimen: Blood   Result Value Ref Range    Protime 12.5 12.5 - 15.2 Seconds    INR 0.88 (L) 0.90 - 1.10   aPTT    Collection Time: 06/04/25  5:30 PM    Specimen: Blood   Result Value Ref Range    PTT 25.6 24.5 - 35.9 seconds   CBC Auto Differential    Collection Time: 06/04/25  5:30 PM    Specimen: Blood   Result Value Ref Range    WBC 7.21 3.40 - 10.80 10*3/mm3    RBC 4.77 3.77 - 5.28 10*6/mm3    Hemoglobin 13.8 12.0 - 15.9 g/dL    Hematocrit 43.9 34.0 - 46.6 %    MCV 92.0 79.0 - 97.0 fL    MCH 28.9 26.6 - 33.0 pg    MCHC 31.4 (L) 31.5 - 35.7 g/dL    RDW 14.3 12.3 - 15.4 %    RDW-SD 48.3 37.0 - 54.0 fl    MPV 11.2 6.0 - 12.0 fL    Platelets 275 140 - 450 10*3/mm3    Neutrophil % 59.2 42.7 - 76.0 %    Lymphocyte % 26.8 19.6 - 45.3 %    Monocyte % 8.5 5.0 - 12.0 %    Eosinophil % 4.6 0.3 - 6.2 %    Basophil % 0.8 0.0 - 1.5 %    Immature Grans % 0.1 0.0 - 0.5 %    Neutrophils, Absolute 4.27 1.70 - 7.00 10*3/mm3    Lymphocytes, Absolute 1.93 0.70 - 3.10 10*3/mm3    Monocytes, Absolute 0.61 0.10 - 0.90 10*3/mm3    Eosinophils, Absolute 0.33 0.00 - 0.40 10*3/mm3    Basophils,  Absolute 0.06 0.00 - 0.20 10*3/mm3    Immature Grans, Absolute 0.01 0.00 - 0.05 10*3/mm3    nRBC 0.0 0.0 - 0.2 /100 WBC   Green Top (Gel)    Collection Time: 06/04/25  5:30 PM   Result Value Ref Range    Extra Tube Hold for add-ons.    Lavender Top    Collection Time: 06/04/25  5:30 PM   Result Value Ref Range    Extra Tube hold for add-on    Gold Top - SST    Collection Time: 06/04/25  5:30 PM   Result Value Ref Range    Extra Tube Hold for add-ons.    Light Blue Top    Collection Time: 06/04/25  5:30 PM   Result Value Ref Range    Extra Tube Hold for add-ons.                 ED Course  ED Course as of 06/04/25 2106 Wed Jun 04, 2025   1635 I assumed patient's care at shift change from Mariano.  Please see his documentation above.  Patient is having pain in the top of her right foot and her right groin.  This has been going on for a week.  She does not recall any injury.  It is not worsened by ambulating.  I am able to palpate dorsalis pedis and posterior tibial pulses in the right lower extremity.  The leg is pink and warm, good capillary refill.  Patient has had a femorofemoral bypass in the past, and has had angioplasty of both iliacs back in January of this year here at this facility.  Patient does not recall where she had her fem-fem bypass surgery.  I have ordered CT angio with runoff.  Patient appears comfortable and in no apparent distress [CM]   1637 Patient reports that last week she got her right foot/ankle tangled up in a dog chain that was attached to a dog, she had a hard time getting  from it, sustaining a wound to the posterior aspect of her right ankle.  She states that she has been cleansing this with soap and water and peroxide, keeping a dressing on it.  Posterior right ankle/Achilles area has about a 2 cm diameter area of mild erythema, centrally there is an approximately 8 mm diameter area of broken skin.  There is no purulent drainage.  This does appear mildly infected.  It is quite  possible that the patient sustained a groin strain or a hip strain while trying to separate herself from this chained dog during this incident. [CM]   1954 Long delay in the patient getting her CT angio.  Now awaiting radiologist interpretation.  Patient seems comfortable, in no apparent acute distress [CM]      ED Course User Index  [CM] Adam Mirza MD                                                       Medical Decision Making  Amount and/or Complexity of Data Reviewed  Labs: ordered.  Radiology: ordered.    Risk  Prescription drug management.        Final diagnoses:   Right leg pain   Peripheral arterial disease   Inguinal strain, right, initial encounter       ED Disposition  ED Disposition       ED Disposition   Discharge    Condition   Stable    Comment   --               Sandeep Ken, DO  [address]   [facility] [address]  [phone]    Go to   Call tomorrow to schedule first available follow-up appointment    Monday, Gaby Jonas, APRN  [address] [place] [id number]  [phone]    Go to   2 to 3 days    [facility] EMERGENCY DEPARTMENT  [address] [facility] [address] [phone]  Go to   If symptoms worsen         Medication List        New Prescriptions      doxycycline 100 MG capsule  Commonly known as: MONODOX  Take 1 capsule by mouth Every 12 (Twelve) Hours for 7 days.     HYDROcodone-acetaminophen 5-325 MG per tablet  Commonly known as: NORCO  Take 1 tablet by mouth Every 8 (Eight) Hours As Needed (pain).               Where to Get Your Medications        These medications were sent to [place], [place] - [address] [place] [address] - [phone] [facility] [phone] [address] [address]      Phone: [phone]   doxycycline 100 MG capsule  HYDROcodone-acetaminophen 5-325 MG per tablet       Please note that portions of this note were completed with a voice recognition program.        Adam Mirza MD  06/04/25 2429

## 2025-06-04 NOTE — TELEPHONE ENCOUNTER
Caller: Grecia Galicia    Relationship: Self    Best call back number: [phone]     What is the best time to reach you: ANYTIME    Who are you requesting to speak with (clinical staff, provider,  specific staff member): PROVIDER    Do you know the name of the person who called: NA    What was the call regarding:     PT REPORTS PAIN IN LOWER EXTREMITIES, REPORTS HER LEGS AND FEET ARE NOW STAYING COLD AND DISCOLORED. SHE IS CONCERNED WITH ANOTHER BLOCKAGE. SHE REPORTS SLEEP DISTURBANCES BECAUSE SHE IS IN SO MUCH PAIN. REPORTS THEY'RE TURNING RED. PT WOULD LIKE TO KNOW IF THERE IS ANYTHING SHE CAN DO FOR PAIN BEFORE HER APPT ON TUESDAY NEXT WEEK. LET PT KNOW IF PAIN WORSENS OR DOESN'T GET BETTER TO GO TO ER. PLEASE ADVISE.     Is it okay if the provider responds through MyChart: NO

## 2025-06-04 NOTE — TELEPHONE ENCOUNTER
Attempted to contact both numbers listed for patient. On patient's number left vm. On contact was incorrect per person that answered phone

## 2025-06-05 NOTE — DISCHARGE INSTRUCTIONS
Home to rest in care of of son.  Keep this wound clean with soap and water, apply Neosporin ointment and a fresh dressing twice a day.  Take the medications that I have written for you as prescribed.  Continue your usual medications including your aspirin and clopidogrel blood thinners.  Call Dr. Ken' office in [place] tomorrow to schedule his first available appointment.  See your primary care provider in the office in 2 to 3 days.  Return to the emergency department right away if symptoms worsen or any problems.

## 2025-06-27 ENCOUNTER — HOSPITAL ENCOUNTER (OUTPATIENT)
Dept: CT IMAGING | Facility: HOSPITAL | Age: 65
Discharge: HOME | End: 2025-06-27
Payer: MEDICARE

## 2025-06-27 DIAGNOSIS — R91.1 LUNG NODULE: ICD-10-CM

## 2025-06-27 PROCEDURE — 71250 CT THORAX DX C-: CPT

## 2025-08-19 ENCOUNTER — OFFICE VISIT (OUTPATIENT)
Dept: CARDIOLOGY | Facility: CLINIC | Age: 65
End: 2025-08-19
Payer: MEDICARE

## 2025-08-19 VITALS
BODY MASS INDEX: 25.48 KG/M2 | DIASTOLIC BLOOD PRESSURE: 85 MMHG | OXYGEN SATURATION: 95 % | WEIGHT: 126.4 LBS | SYSTOLIC BLOOD PRESSURE: 155 MMHG | HEIGHT: 59 IN | HEART RATE: 85 BPM

## 2025-08-19 DIAGNOSIS — I25.10 ASCVD (ARTERIOSCLEROTIC CARDIOVASCULAR DISEASE): ICD-10-CM

## 2025-08-19 DIAGNOSIS — I10 ESSENTIAL HYPERTENSION: ICD-10-CM

## 2025-08-19 DIAGNOSIS — I73.9 PERIPHERAL ARTERY DISEASE: Chronic | ICD-10-CM

## 2025-08-19 DIAGNOSIS — E78.5 DYSLIPIDEMIA: Primary | Chronic | ICD-10-CM

## 2025-08-19 RX ORDER — CARVEDILOL 25 MG/1
25 TABLET ORAL 2 TIMES DAILY WITH MEALS
Qty: 180 TABLET | Refills: 2 | Status: SHIPPED | OUTPATIENT
Start: 2025-08-19

## 2025-08-19 RX ORDER — ATORVASTATIN CALCIUM 80 MG/1
80 TABLET, FILM COATED ORAL DAILY
Qty: 90 TABLET | Refills: 2 | Status: SHIPPED | OUTPATIENT
Start: 2025-08-19

## 2025-08-19 RX ORDER — CLOPIDOGREL BISULFATE 75 MG/1
75 TABLET ORAL DAILY
Qty: 90 TABLET | Refills: 3 | Status: SHIPPED | OUTPATIENT
Start: 2025-08-19

## 2025-08-19 RX ORDER — AMLODIPINE BESYLATE 5 MG/1
5 TABLET ORAL DAILY
Qty: 90 TABLET | Refills: 2 | Status: SHIPPED | OUTPATIENT
Start: 2025-08-19

## (undated) DEVICE — GLIDESHEATH SLENDER STAINLESS STEEL KIT: Brand: GLIDESHEATH SLENDER

## (undated) DEVICE — LN FLTR ORL/NASL MICROSTREAM NONINTUB A/LNG

## (undated) DEVICE — ASMBL SPK CONTRST CONTRL

## (undated) DEVICE — DRSNG SURESITE WNDW 4X4.5

## (undated) DEVICE — CATH IMG IVUS VISION PV DIG 0.014P 5F 150CM

## (undated) DEVICE — PACLITAXEL-COATED PTA BALLOON CATHETER: Brand: RANGER™

## (undated) DEVICE — MODEL AT P65, P/N 701554-001KIT CONTENTS: HAND CONTROLLER, 3-WAY HIGH-PRESSURE STOPCOCK WITH ROTATING END AND PREMIUM HIGH-PRESSURE TUBING: Brand: ANGIOTOUCH® KIT

## (undated) DEVICE — CATH SUP CXI DAV/TP 0.035IN 4F 135CM

## (undated) DEVICE — CATH FLSH OMNI SFT 5F 90CM

## (undated) DEVICE — ADULT DISPOSABLE SINGLE-PATIENT USE PULSE OXIMETER SENSOR: Brand: NONIN

## (undated) DEVICE — ST INF PRI SMRTSTE 20DRP 2VLV 24ML 117

## (undated) DEVICE — RADIFOCUS GLIDEWIRE ADVANTAGE GUIDEWIRE: Brand: GLIDEWIRE ADVANTAGE

## (undated) DEVICE — DEV INFL MONARCH 25W

## (undated) DEVICE — KT NOVA WTRANSD 60 IN

## (undated) DEVICE — KT CATH BALN FEM/ART M5/PLS 6F 6X60MM 135CM

## (undated) DEVICE — A2000 MULTI-USE SYRINGE KIT, P/N 701277-003KIT CONTENTS: 100ML CONTRAST RESERVOIR AND TUBING WITH CONTRAST SPIKE AND CLAMP: Brand: A2000 MULTI-USE SYRINGE KIT

## (undated) DEVICE — PTA BALLOON DILATATION CATHETER: Brand: CHARGER™

## (undated) DEVICE — GW MICRO APPROACH CTO25 .014 300CM

## (undated) DEVICE — SHEATH INTRO SUPERSHEATH JWIRE .035 5F 11CM

## (undated) DEVICE — 500ML,PRESSURE INFUSER W/STOPCOCK: Brand: MEDLINE

## (undated) DEVICE — Device

## (undated) DEVICE — GW MIC APPROACH HY/ST PTFE SS .014IN 300CM

## (undated) DEVICE — ST EXT IV SMRTSTE 2VLV FIX M LL 6ML 41

## (undated) DEVICE — KT MINI ACC MAK COAXL 5F 10CM

## (undated) DEVICE — GUIDEWIRE BOWL W/LID: Brand: MEDLINE INDUSTRIES, INC.

## (undated) DEVICE — PK CATH CARD 70